# Patient Record
Sex: MALE | Race: WHITE
[De-identification: names, ages, dates, MRNs, and addresses within clinical notes are randomized per-mention and may not be internally consistent; named-entity substitution may affect disease eponyms.]

---

## 2017-01-12 ENCOUNTER — HOSPITAL ENCOUNTER (OUTPATIENT)
Dept: HOSPITAL 45 - C.LABPVFM | Age: 77
Discharge: HOME | End: 2017-01-12
Attending: GENERAL PRACTICE
Payer: COMMERCIAL

## 2017-01-12 DIAGNOSIS — M10.9: Primary | ICD-10-CM

## 2017-01-12 LAB
ANION GAP SERPL CALC-SCNC: 11 MMOL/L (ref 3–11)
BUN SERPL-MCNC: 17 MG/DL (ref 7–18)
BUN/CREAT SERPL: 13.9 (ref 10–20)
CALCIUM SERPL-MCNC: 9.4 MG/DL (ref 8.5–10.1)
CHLORIDE SERPL-SCNC: 103 MMOL/L (ref 98–107)
CO2 SERPL-SCNC: 25 MMOL/L (ref 21–32)
CREAT SERPL-MCNC: 1.2 MG/DL (ref 0.6–1.4)
GLUCOSE SERPL-MCNC: 113 MG/DL (ref 70–99)
POTASSIUM SERPL-SCNC: 3.9 MMOL/L (ref 3.5–5.1)
SODIUM SERPL-SCNC: 139 MMOL/L (ref 136–145)
URATE SERPL-MCNC: 4.6 MG/DL (ref 2.6–7.2)

## 2017-02-21 ENCOUNTER — HOSPITAL ENCOUNTER (INPATIENT)
Dept: HOSPITAL 45 - C.EDB | Age: 77
LOS: 6 days | Discharge: NURSING, REHAB OR LONG-TERM CARE | DRG: 558 | End: 2017-02-27
Attending: HOSPITALIST | Admitting: HOSPITALIST
Payer: COMMERCIAL

## 2017-02-21 VITALS
DIASTOLIC BLOOD PRESSURE: 72 MMHG | TEMPERATURE: 98.6 F | OXYGEN SATURATION: 96 % | SYSTOLIC BLOOD PRESSURE: 161 MMHG | HEART RATE: 95 BPM

## 2017-02-21 VITALS
HEART RATE: 88 BPM | TEMPERATURE: 99.14 F | SYSTOLIC BLOOD PRESSURE: 166 MMHG | OXYGEN SATURATION: 93 % | DIASTOLIC BLOOD PRESSURE: 76 MMHG

## 2017-02-21 VITALS
BODY MASS INDEX: 33.28 KG/M2 | HEIGHT: 68 IN | WEIGHT: 219.58 LBS | HEIGHT: 68 IN | WEIGHT: 219.58 LBS | BODY MASS INDEX: 33.28 KG/M2

## 2017-02-21 VITALS — OXYGEN SATURATION: 96 %

## 2017-02-21 VITALS
TEMPERATURE: 97.7 F | DIASTOLIC BLOOD PRESSURE: 68 MMHG | HEART RATE: 80 BPM | OXYGEN SATURATION: 96 % | SYSTOLIC BLOOD PRESSURE: 127 MMHG

## 2017-02-21 VITALS — OXYGEN SATURATION: 97 %

## 2017-02-21 DIAGNOSIS — M62.82: Primary | ICD-10-CM

## 2017-02-21 DIAGNOSIS — E11.42: ICD-10-CM

## 2017-02-21 DIAGNOSIS — R20.0: ICD-10-CM

## 2017-02-21 DIAGNOSIS — M10.9: ICD-10-CM

## 2017-02-21 DIAGNOSIS — I10: ICD-10-CM

## 2017-02-21 DIAGNOSIS — Z79.899: ICD-10-CM

## 2017-02-21 DIAGNOSIS — E11.65: ICD-10-CM

## 2017-02-21 DIAGNOSIS — K57.90: ICD-10-CM

## 2017-02-21 DIAGNOSIS — Z79.82: ICD-10-CM

## 2017-02-21 DIAGNOSIS — R11.0: ICD-10-CM

## 2017-02-21 DIAGNOSIS — E11.43: ICD-10-CM

## 2017-02-21 DIAGNOSIS — Z87.891: ICD-10-CM

## 2017-02-21 DIAGNOSIS — Z79.84: ICD-10-CM

## 2017-02-21 DIAGNOSIS — G47.33: ICD-10-CM

## 2017-02-21 DIAGNOSIS — E78.2: ICD-10-CM

## 2017-02-21 DIAGNOSIS — Z86.718: ICD-10-CM

## 2017-02-21 DIAGNOSIS — G60.9: ICD-10-CM

## 2017-02-21 DIAGNOSIS — F41.9: ICD-10-CM

## 2017-02-21 DIAGNOSIS — R29.6: ICD-10-CM

## 2017-02-21 DIAGNOSIS — G47.00: ICD-10-CM

## 2017-02-21 LAB
ALP SERPL-CCNC: 99 U/L (ref 45–117)
ALT SERPL-CCNC: 24 U/L (ref 12–78)
ANION GAP SERPL CALC-SCNC: 11 MMOL/L (ref 3–11)
APPEARANCE UR: CLEAR
AST SERPL-CCNC: 56 U/L (ref 15–37)
BASOPHILS # BLD: 0.01 K/UL (ref 0–0.2)
BASOPHILS NFR BLD: 0.2 %
BENZODIAZ UR-MCNC: (no result) UG/L
BILIRUB UR-MCNC: (no result) MG/DL
BUN SERPL-MCNC: 13 MG/DL (ref 7–18)
BUN/CREAT SERPL: 10.1 (ref 10–20)
CALCIUM SERPL-MCNC: 8.8 MG/DL (ref 8.5–10.1)
CHLORIDE SERPL-SCNC: 102 MMOL/L (ref 98–107)
CKMB/CK RATIO: 0.5 (ref 0–3)
CO2 SERPL-SCNC: 24 MMOL/L (ref 21–32)
COLOR UR: YELLOW
COMPLETE: YES
CREAT CL PREDICTED SERPL C-G-VRATE: 56 ML/MIN
CREAT SERPL-MCNC: 1.3 MG/DL (ref 0.6–1.4)
EOSINOPHIL NFR BLD AUTO: 184 K/UL (ref 130–400)
GLUCOSE SERPL-MCNC: 136 MG/DL (ref 70–99)
HCT VFR BLD CALC: 43 % (ref 42–52)
IG%: 0.3 %
IMM GRANULOCYTES NFR BLD AUTO: 11.1 %
INR PPP: 1.1 (ref 0.9–1.1)
LYMPHOCYTES # BLD: 0.69 K/UL (ref 1.2–3.4)
MAGNESIUM SERPL-MCNC: 1.9 MG/DL (ref 1.8–2.4)
MANUAL MICROSCOPIC REQUIRED?: NO
MCH RBC QN AUTO: 31.5 PG (ref 25–34)
MCHC RBC AUTO-ENTMCNC: 34 G/DL (ref 32–36)
MCV RBC AUTO: 92.7 FL (ref 80–100)
MONOCYTES NFR BLD: 19.3 %
NEUTROPHILS # BLD AUTO: 0.2 %
NEUTROPHILS NFR BLD AUTO: 68.9 %
NITRITE UR QL STRIP: (no result)
PARTIAL THROMBOPLASTIN RATIO: 1
PCP UR-MCNC: (no result) UG/L
PH UR STRIP: 6 [PH] (ref 4.5–7.5)
PMV BLD AUTO: 10.4 FL (ref 7.4–10.4)
POTASSIUM SERPL-SCNC: 3.9 MMOL/L (ref 3.5–5.1)
PROTHROMBIN TIME: 11.9 SECONDS (ref 9–12)
RBC # BLD AUTO: 4.64 M/UL (ref 4.7–6.1)
REVIEW REQ?: NO
SODIUM SERPL-SCNC: 137 MMOL/L (ref 136–145)
SP GR UR STRIP: 1.01 (ref 1–1.03)
URINE BILL WITH OR WITHOUT MIC: (no result)
URINE EPITHELIAL CELL AUTO: (no result) /LPF (ref 0–5)
UROBILINOGEN UR-MCNC: (no result) MG/DL
WBC # BLD AUTO: 6.22 K/UL (ref 4.8–10.8)

## 2017-02-21 RX ADMIN — GABAPENTIN SCH MG: 300 CAPSULE ORAL at 17:54

## 2017-02-21 RX ADMIN — SODIUM CHLORIDE SCH MLS/HR: 900 INJECTION, SOLUTION INTRAVENOUS at 21:10

## 2017-02-21 RX ADMIN — INSULIN ASPART SCH UNITS: 100 INJECTION, SOLUTION INTRAVENOUS; SUBCUTANEOUS at 21:00

## 2017-02-21 RX ADMIN — GABAPENTIN SCH MG: 300 CAPSULE ORAL at 21:10

## 2017-02-21 RX ADMIN — SODIUM CHLORIDE SCH MLS/HR: 900 INJECTION, SOLUTION INTRAVENOUS at 14:44

## 2017-02-21 RX ADMIN — INSULIN ASPART SCH UNITS: 100 INJECTION, SOLUTION INTRAVENOUS; SUBCUTANEOUS at 17:53

## 2017-02-21 NOTE — EMERGENCY ROOM VISIT NOTE
History


Report prepared by Uma:  Devonte Gould


Under the Supervision of:  Dr. Jose Alejandro Elise D.O.


First contact with patient:  09:20


Chief Complaint:  NEURO SYMPTOMS


Stated Complaint:  NUMBNESS IN LEGS,ARMS,HANDS, FALLS


Nursing Triage Summary:  


pt reports since last night bilat leg weakness and feeling numb.  pt reports 


last night also his arm were numb and weak but "my arms are getting better."





History of Present Illness


The patient is a 76 year old male who presents to the Emergency Room with 

complaints of worsening generalized weakness that started around 0230 last 

night. The patient's extremities are all weak, but his arms appear to be 

improving. The patient's daughter notes that his bilateral legs appear slightly 

more swollen. The patient denies any fevers, headaches, cough, chest pain, 

shortness of breath, nausea, vomiting, or  abdominal pain. He has not had any 

recent illnesses. He denies any stroke history. The patient is normally able to 

ambulate, but was unable this morning secondary to weakness. He states that he 

has been eating and drinking okay. The patient appears to be at baseline mental 

status, as per daughter. She notes that he has had some falls recently. He has 

been going to PT since an accident in November with his last appointment being 

yesterday. The patient is no longer on blood thinners, which he was on for past 

DVTs and a PE.





   Source of History:  patient, family


   Onset:  0230 last night


   Position:  other (generalized)


   Quality:  other (weakness)


   Timing:  worsening


   Associated Symptoms:  No SOB, No abdominal pain, No chest pain, No cough, No 

fevers, No headache, No nausea, No vomiting





Review of Systems


See HPI for pertinent positives & negatives. A total of 10 systems reviewed and 

were otherwise negative.





Past Medical & Surgical


Medical Problems:


(1) BENIGN HYPERTENSION


(2) DIAB W KETOACIDOSIS, TYPE II OR UNSPEC TYPE, UNCONTROLLED


(3) DIVERTICULOSIS COLON (W/O MENT OF HEMORRHAGE)


(4) GOUT NOS


(5) IDIO PERIPH NEURPTHY NOS


(6) MIXED HYPERLIPIDEMIA








Family History





Diabetes mellitus


FH: breast cancer





Social History


Smoking Status:  Former Smoker


Alcohol Use:  none


Drug Use:  none


Marital Status:  


Housing Status:  lives with significant other


Occupation Status:  retired





Current/Historical Medications


Scheduled


Allopurinol (Zyloprim), 300 MG PO DAILY


Aspirin (Aspirin Ec), 81 MG PO DAILY


Cholecalciferol (Vitamin D3), 2,000 UNITS PO DAILY


Gabapentin (Neurontin), 300 MG PO QID


Metformin Hcl (Glucophage), 500 MG PO BID


Polyethylene Glycol 3350 (Bulk (Polyethylene Glycol 3350), 17 GM PO BID


Triamterene/Hctz (Dyazide 37.5MG/25MG), 0.5 TAB PO DAILY





Scheduled PRN


Clonazepam (Klonopin), 0.25-0.5 MG PO HS PRN for Sleep





Allergies


Coded Allergies:  


     No Known Allergies (Verified , 2/21/17)





Physical Exam


Vital Signs











  Date Time  Temp Pulse Resp B/P Pulse Ox O2 Delivery O2 Flow Rate FiO2


 


2/21/17 12:15  80 20 155/71 97 Room Air  


 


2/21/17 11:39     97 Room Air  


 


2/21/17 11:01  68 18 102/61 97 Room Air  


 


2/21/17 10:09  79 20 126/67 95 Room Air  


 


2/21/17 09:35     96 Room Air  


 


2/21/17 09:29  86      


 


2/21/17 09:22  83 20 137/69 96 Room Air  


 


2/21/17 09:07 37.1 86 18 113/68 96 Room Air  











Physical Exam


GENERAL:  Patient is awake and somewhat listless appearing, does not appear to 

be in pain or anxious.


EYES: The conjunctivae are clear.  The pupils are round and reactive. 


EARS, NOSE, MOUTH AND THROAT: The nose is without any evidence of any 

deformity. Mucous membranes are moist tongue is midline 


NECK: The neck is nontender and supple.


RESPIRATORY: Scattered rhonchi noted throughout, no tachypnea or pursed lip 

breathing.


CARDIOVASCULAR:  Regular rate and rhythm noted there no murmurs rubs or gallops 

normal S1 normal S2 


GASTROINTESTINAL: The abdomen is soft. Bowel sounds are present in all 

quadrants. Abdomen is nontender


MUSCULOSKELETAL/EXTREMITIES: There is no evidence of gross deformity full range 

of motion is noted in the hips and shoulders


SKIN: There is no obvious evidence of any rash. There are no petechiae, pallor 

or cyanosis noted. Trace pedal edema bilaterally.


NEUROLOGIC:  Patient is oriented x 3, strength is diminished but symmetric 

bilaterally, no drift in upper extremities, able to hold legs off of bed for 5 

seconds.





Medical Decision & Procedures


ER Provider


Diagnostic Interpretation:


Radiology results as stated below per my review and radiologist interpretation:





CHEST ONE VIEW PORTABLE





CLINICAL HISTORY: Stroke dyspnea





COMPARISON STUDY:  2/19/2016





FINDINGS: Minimal atelectasis left base. Lungs otherwise appear clear. Heart top


limits normal in size. 





IMPRESSION:  Minimal platelike atelectasis left base. Otherwise negative study 














Electronically signed by:  Gerald Carrillo M.D.


2/21/2017 10:20 AM





Dictated Date/Time:  2/21/2017 10:19 AM











HEAD CT NONCONTRAST





CT DOSE: 998.18 mGy.cm





HISTORY: Mental status change  Stroke





TECHNIQUE: Multiaxial CT images of the head were performed without the use of


intravenous contrast.





Comparison: None.





Findings: The paranasal sinuses and mastoid air cells are clear. The calvarium


and skull base are intact. The ventricles and sulci are within normal limits.


There is no mass, hematoma, midline shift, or acute infarct.





Impression:


No acute intracranial abnormality. 











Electronically signed by:  Gerald Carrillo M.D.


2/21/2017 10:06 AM





Dictated Date/Time:  2/21/2017 10:01 AM





Laboratory Results


2/21/17 09:48








Red Blood Count 4.64, Mean Corpuscular Volume 92.7, Mean Corpuscular Hemoglobin 

31.5, Mean Corpuscular Hemoglobin Concent 34.0, Mean Platelet Volume 10.4, 

Neutrophils (%) (Auto) 68.9, Lymphocytes (%) (Auto) 11.1, Monocytes (%) (Auto) 

19.3, Eosinophils (%) (Auto) 0.2, Basophils (%) (Auto) 0.2, Neutrophils # (Auto

) 4.29, Lymphocytes # (Auto) 0.69, Monocytes # (Auto) 1.20, Eosinophils # (Auto

) 0.01, Basophils # (Auto) 0.01





2/21/17 09:48

















Test


  2/21/17


09:48 2/21/17


10:07


 


White Blood Count


  6.22 K/uL


(4.8-10.8) 


 


 


Red Blood Count


  4.64 M/uL


(4.7-6.1) 


 


 


Hemoglobin


  14.6 g/dL


(14.0-18.0) 


 


 


Hematocrit 43.0 % (42-52)  


 


Mean Corpuscular Volume


  92.7 fL


() 


 


 


Mean Corpuscular Hemoglobin


  31.5 pg


(25-34) 


 


 


Mean Corpuscular Hemoglobin


Concent 34.0 g/dl


(32-36) 


 


 


Platelet Count


  184 K/uL


(130-400) 


 


 


Mean Platelet Volume


  10.4 fL


(7.4-10.4) 


 


 


Neutrophils (%) (Auto) 68.9 %  


 


Lymphocytes (%) (Auto) 11.1 %  


 


Monocytes (%) (Auto) 19.3 %  


 


Eosinophils (%) (Auto) 0.2 %  


 


Basophils (%) (Auto) 0.2 %  


 


Neutrophils # (Auto)


  4.29 K/uL


(1.4-6.5) 


 


 


Lymphocytes # (Auto)


  0.69 K/uL


(1.2-3.4) 


 


 


Monocytes # (Auto)


  1.20 K/uL


(0.11-0.59) 


 


 


Eosinophils # (Auto)


  0.01 K/uL


(0-0.5) 


 


 


Basophils # (Auto)


  0.01 K/uL


(0-0.2) 


 


 


RDW Standard Deviation


  46.6 fL


(36.4-46.3) 


 


 


RDW Coefficient of Variation


  13.7 %


(11.5-14.5) 


 


 


Immature Granulocyte % (Auto) 0.3 %  


 


Immature Granulocyte # (Auto)


  0.02 K/uL


(0.00-0.02) 


 


 


Erythrocyte Sedimentation Rate


  13 mm/hr


(0-14) 


 


 


Prothrombin Time


  11.9 SECONDS


(9.0-12.0) 


 


 


Prothromb Time International


Ratio 1.1 (0.9-1.1) 


  


 


 


Activated Partial


Thromboplast Time 26.7 SECONDS


(21.0-31.0) 


 


 


Partial Thromboplastin Ratio 1.0  


 


Anion Gap


  11.0 mmol/L


(3-11) 


 


 


Est Creatinine Clear Calc


Drug Dose 56.0 ml/min 


  


 


 


Estimated GFR (


American) 61.9 


  


 


 


Estimated GFR (Non-


American 53.4 


  


 


 


BUN/Creatinine Ratio 10.1 (10-20)  


 


Calcium Level


  8.8 mg/dl


(8.5-10.1) 


 


 


Magnesium Level


  1.9 mg/dl


(1.8-2.4) 


 


 


Total Bilirubin


  0.7 mg/dl


(0.2-1) 


 


 


Direct Bilirubin


  0.2 mg/dl


(0-0.2) 


 


 


Aspartate Amino Transf


(AST/SGOT) 56 U/L (15-37) 


  


 


 


Alanine Aminotransferase


(ALT/SGPT) 24 U/L (12-78) 


  


 


 


Alkaline Phosphatase


  99 U/L


() 


 


 


Total Creatine Kinase


  2844 U/L


() 


 


 


Creatine Kinase MB


  13.1 ng/ml


(0.5-3.6) 


 


 


Creatine Kinase MB Ratio 0.5 (0-3.0)  


 


Troponin I


  < 0.015 ng/ml


(0-0.045) 


 


 


C-Reactive Protein


  0.87 mg/dl


(0-0.29) 


 


 


Total Protein


  7.2 gm/dl


(6.4-8.2) 


 


 


Albumin


  3.5 gm/dl


(3.4-5.0) 


 


 


Urine Color  YELLOW 


 


Urine Appearance  CLEAR (CLEAR) 


 


Urine pH  6.0 (4.5-7.5) 


 


Urine Specific Gravity


  


  1.007


(1.000-1.030)


 


Urine Protein  NEG (NEG) 


 


Urine Glucose (UA)  NEG (NEG) 


 


Urine Ketones  NEG (NEG) 


 


Urine Occult Blood  3+ (NEG) 


 


Urine Nitrite  NEG (NEG) 


 


Urine Bilirubin  NEG (NEG) 


 


Urine Urobilinogen  NEG (NEG) 


 


Urine Leukocyte Esterase  TRACE (NEG) 


 


Urine WBC (Auto)  1-5 /hpf (0-5) 


 


Urine RBC (Auto)  0-4 /hpf (0-4) 


 


Urine Hyaline Casts (Auto)  0 /lpf (0-5) 


 


Urine Epithelial Cells (Auto)


  


  5-10 /lpf


(0-5)


 


Urine Bacteria (Auto)  NEG (NEG) 


 


Urine Opiates Screen  NEG (NEG) 


 


Urine Methadone, Qualitative  NEG (NEG) 


 


Urine Barbiturates  NEG (NEG) 


 


Urine Phencyclidine (PCP)


Level 


  NEG (NEG) 


 


 


Ur


Amphetamine/Methamphetamine 


  NEG (NEG) 


 


 


MDMA (Ecstasy) Screen  NEG (NEG) 


 


Urine Benzodiazepines Screen  NEG (NEG) 


 


Urine Cocaine Metabolite  NEG (NEG) 


 


Urine Marijuana (THC)  NEG (NEG) 





Laboratory results per my review.





Medications Administered











 Medications


  (Trade)  Dose


 Ordered  Sig/Kalpesh


 Route  Start Time


 Stop Time Status Last Admin


Dose Admin


 


 Sodium Chloride  1,000 ml @ 


 50 mls/hr  Q20H


 IV  2/21/17 09:27


 2/21/17 12:38 DC 2/21/17 10:11


50 MLS/HR


 


 Sodium Chloride  1,000 ml @ 


 999 mls/hr  Q1H1M STAT


 IV  2/21/17 11:18


 2/21/17 12:18 DC 2/21/17 11:24


999 MLS/HR


 


 Sodium Chloride


  (Nss 1000ml)  1,000 ml @ 


 125 mls/hr  Q8H


 IV  2/21/17 12:30


 3/23/17 12:29  2/21/17 14:44


125 MLS/HR











ECG


Indication:  weakness


Rate (beats per minute):  81


Rhythm:  normal sinus


Findings:  no acute ischemic change, other (LVH noted by voltage criteria)


Comparison ECG Date:  20 Feb. 2016


Change:  no significant change





ED Course


0920: The patient was evaluated in room B11b. A complete history and physical 

examination were performed. 





0927: NSS 1000 ml @ 50 mls/hr.





1117: [facility] the [facility] The 

patient will be evaluated.





1118: NSS 1000 ml @ 999 mls/hr.





1121: Updated the patient. He verbalized agreement of the plan.





Medical Decision


Prior records/ancillary studies reviewed and summarized above.


Nursing notes reviewed.


Additional history obtained from daughter.


The patient's history was concerning for weakness.





Differential diagnosis:


Etiologies such as metabolic, infection, hypo/hyperglycemia, electrolyte 

abnormalities, cardiac sources, intracerebral event, toxicologic, neurologic, 

as well as others were entertained. 





The patient is a 76-year-old male who presented to the emergency department for 

an evaluation of generalized weakness. The patient the patient did not have any 

definite focal neurologic deficits but he was certainly week in both lower 

extremities. The patient was not found have an acute abnormality on CT the head 

was found have elevated CPK consistent with rhabdomyolysis on laboratory 

studies. The patient was treated with IV fluids. He was reevaluated multiple 

times. I discussed the patient's laboratory and radiographic studies with him 

and his family. I also discussed his case with the on-call [facility]. They have agreed to evaluate the patient in the emergency 

department for further management and disposition.





Consults


Time Called:  1112


Consulting Physician:  [facility]


Returned Call:  1117


1117: Discussed the case with [facility]. The 

patient will be evaluated.





Impression





 Primary Impression:  


 Weakness


 Additional Impression:  


 Rhabdomyolysis





Scribe Attestation


The scribe's documentation has been prepared under my direction and personally 

reviewed by me in its entirety. I confirm that the note above accurately 

reflects all work, treatment, procedures, and medical decision making performed 

by me.





Departure Information


Dispostion


Being Evaluated By Hospitalist





Referrals


Johnnie Reis M.D. (PCP)





Patient Instructions


[facility]





Problem Qualifiers

## 2017-02-21 NOTE — HISTORY AND PHYSICAL
History & Physical


Date & Time of Service:


Feb 21, 2017 at 12:48


Chief Complaint:


Numbness In Legs,Arms,Hands, Falls


Primary Care Physician:


Johnnie Reis M.D.


History of Present Illness


Source:  patient, family


Mr. Chaudhari is a 77 y/o male with PMHx of T2DM with Diabetic Neuropathy, HTN, Gout

, DVT with PE (2014) off anticoagulation who presents to the ED c/o generalized 

arm and leg weakness and numbness/tingling that started at 0230. Patient 

reports that he does have chronic numbness/tingling of lower extremities due to 

his diabetes but is normally not this severe in which it affects ambulation. He 

has participated in outpatient PT twice a week and was fine during yesterday's 

session. He reports that he ambulates without assistive devices at home. At 0230

, he tried to walk but due to the numbness he lost his balance and fell. He was 

able to get himself up. He denies lightheadedness/dizziness prior to the fall, 

hitting his head, or LOC. He then reports another fall that occurred at approx. 

0630 due to the same circumstances. However this time he reports he was lying 

on the ground for approx. 10-20 minutes. Prior to today, he reports a near fall 

yesterday but fell into his son's truck and didn't hit the ground. Prior to 

this he hasn't fallen in 6 or more months. Daughter reports lower extremities 

are more swollen however patient denies this. He denies fever/chills, CP, 

palpitations, SOB, N/V, abdominal pain, dysuria, constipation/diarrhea, or 

melena/hematochezia.





In the ED, CT was negative for acute intracranial findings. CXR without 

evidence of consolidation. He is afebrile and normotensive. Labs largely 

unremarkable except for CK of 2844. UA with evidence of 3+ occult blood but 

negative for bacteria. He was hydrated with NSS 1 L bolus then 1 L at 50 mL/hr. 

He will be admitted to Med/Surg for Rhadomyolysis.





Past Medical/Surgical History


Medical Problems:


(1) BENIGN HYPERTENSION


Status: Chronic  





(2) DIAB W KETOACIDOSIS, TYPE II OR UNSPEC TYPE, UNCONTROLLED


Status: Chronic  





(3) DIVERTICULOSIS COLON (W/O MENT OF HEMORRHAGE)


Status: Chronic  





(4) GOUT NOS


Status: Chronic  





(5) IDIO PERIPH NEURPTHY NOS


Status: Chronic  





(6) MIXED HYPERLIPIDEMIA


Status: Chronic  











Family History





Diabetes mellitus


FH: breast cancer





Social History


Smoking Status:  Former Smoker


Drug Use:  none


Marital Status:  


Occupational Status:  retired





Multi-Drug Resistant Organisms


History of MDRO:  No





Allergies


Coded Allergies:  


     No Known Allergies (Verified , 2/21/17)





Home Medications


Scheduled


Allopurinol (Zyloprim), 300 MG PO DAILY


Aspirin (Aspirin Ec), 81 MG PO DAILY


Cholecalciferol (Vitamin D3), 2,000 UNITS PO DAILY


Gabapentin (Neurontin), 300 MG PO QID


Metformin Hcl (Glucophage), 500 MG PO BID


Polyethylene Glycol 3350 (Bulk (Polyethylene Glycol 3350), 17 GM PO BID


Triamterene/Hctz (Dyazide 37.5MG/25MG), 0.5 TAB PO DAILY





Scheduled PRN


Clonazepam (Klonopin), 0.25-0.5 MG PO HS PRN for Sleep





Review of Systems


Constitutional:  No chills, No fever


Eyes:  No worsening of vision


ENT:  No nasal symptoms, No sore throat, No trouble swallowing


Respiratory:  No cough, No shortness of breath


Cardiovascular:  No chest pain


Abdomen:  No constipation, No diarrhea, No nausea, No pain, No vomiting


Musculoskeletal:  No calf pain, No swelling


Genitourinary - Male:  No dysuria


Neurologic:  + balance problems, + numbness/tingling (upper and lower 

extremities bilat; upper extremities improving), + weakness (generalized UE and 

LE B/L with improvement in UE B/L)


Endocrine:  No fatigue


Integumentary:  + problem reported (multiple red superficial abrasions of upper 

extremities; small abrasion of L forehead patient reports is from CPAP), No rash





Physical Exam


Vital Signs











  Date Time  Temp Pulse Resp B/P Pulse Ox O2 Delivery O2 Flow Rate FiO2


 


2/21/17 12:15  80 20 155/71 97 Room Air  


 


2/21/17 11:39     97 Room Air  


 


2/21/17 11:01  68 18 102/61 97 Room Air  


 


2/21/17 10:09  79 20 126/67 95 Room Air  


 


2/21/17 09:35     96 Room Air  


 


2/21/17 09:29  86      


 


2/21/17 09:22  83 20 137/69 96 Room Air  


 


2/21/17 09:07 37.1 86 18 113/68 96 Room Air  








General Appearance:  WD/WN, no apparent distress, + pertinent finding (mask-

like face)


Eyes:  PERRL, EOMI, sclerae normal, + pertinent finding (Ptosis of L eye)


ENT:  hearing grossly normal


Neck:  supple, no adenopathy, no JVD, trachea midline


Respiratory/Chest:  lungs clear, normal breath sounds, no respiratory distress, 

no accessory muscle use


Cardiovascular:  regular rate, rhythm, no gallop, no murmur


Abdomen/GI:  normal bowel sounds, non tender, soft, + distended


Back:  normal inspection, no CVA tenderness


Extremities/Musculoskelatal:  no calf tenderness, + swelling (trace non-pitting 

edema of ankles)


Neurologic/Psych:  alert, oriented x 3, + pertinent finding (diffuse muscle 

weakness of upper and lower extremities but equal B/L; facial movements 

symmetrical)


Skin:  normal color, warm/dry





Diagnostics


Laboratory Results





Results Past 24 Hours








Test


  2/21/17


09:48 2/21/17


10:06 2/21/17


10:07 2/21/17


12:13 Range/Units


 


 


White Blood Count 6.22    4.8-10.8  K/uL


 


Red Blood Count 4.64    4.7-6.1  M/uL


 


Hemoglobin 14.6    14.0-18.0  g/dL


 


Hematocrit 43.0    42-52  %


 


Mean Corpuscular Volume 92.7      fL


 


Mean Corpuscular Hemoglobin 31.5    25-34  pg


 


Mean Corpuscular Hemoglobin


Concent 34.0


  


  


  


  32-36  g/dl


 


 


Platelet Count 184    130-400  K/uL


 


Mean Platelet Volume 10.4    7.4-10.4  fL


 


Neutrophils (%) (Auto) 68.9     %


 


Lymphocytes (%) (Auto) 11.1     %


 


Monocytes (%) (Auto) 19.3     %


 


Eosinophils (%) (Auto) 0.2     %


 


Basophils (%) (Auto) 0.2     %


 


Neutrophils # (Auto) 4.29    1.4-6.5  K/uL


 


Lymphocytes # (Auto) 0.69    1.2-3.4  K/uL


 


Monocytes # (Auto) 1.20    0.11-0.59  K/uL


 


Eosinophils # (Auto) 0.01    0-0.5  K/uL


 


Basophils # (Auto) 0.01    0-0.2  K/uL


 


RDW Standard Deviation 46.6    36.4-46.3  fL


 


RDW Coefficient of Variation 13.7    11.5-14.5  %


 


Immature Granulocyte % (Auto) 0.3     %


 


Immature Granulocyte # (Auto) 0.02    0.00-0.02  K/uL


 


Prothrombin Time


  11.9


  


  


  


  9.0-12.0


SECONDS


 


Prothromb Time International


Ratio 1.1


  


  


  


  0.9-1.1  


 


 


Activated Partial


Thromboplast Time 26.7


  


  


  


  21.0-31.0


SECONDS


 


Partial Thromboplastin Ratio 1.0     


 


Sodium Level 137    136-145  mmol/L


 


Potassium Level 3.9    3.5-5.1  mmol/L


 


Chloride Level 102      mmol/L


 


Carbon Dioxide Level 24    21-32  mmol/L


 


Anion Gap 11.0    3-11  mmol/L


 


Blood Urea Nitrogen 13    7-18  mg/dl


 


Creatinine


  1.30


  


  


  


  0.60-1.40


mg/dl


 


Est Creatinine Clear Calc


Drug Dose 56.0


  


  


  


   ml/min


 


 


Estimated GFR (


American) 61.9


  


  


  


   


 


 


Estimated GFR (Non-


American 53.4


  


  


  


   


 


 


BUN/Creatinine Ratio 10.1    10-20  


 


Random Glucose 136    70-99  mg/dl


 


Calcium Level 8.8    8.5-10.1  mg/dl


 


Magnesium Level 1.9    1.8-2.4  mg/dl


 


Total Bilirubin 0.7    0.2-1  mg/dl


 


Direct Bilirubin 0.2    0-0.2  mg/dl


 


Aspartate Amino Transf


(AST/SGOT) 56


  


  


  


  15-37  U/L


 


 


Alanine Aminotransferase


(ALT/SGPT) 24


  


  


  


  12-78  U/L


 


 


Alkaline Phosphatase 99      U/L


 


Total Creatine Kinase 2844      U/L


 


Creatine Kinase MB 13.1    0.5-3.6  ng/ml


 


Creatine Kinase MB Ratio 0.5    0-3.0  


 


Troponin I < 0.015    0-0.045  ng/ml


 


Total Protein 7.2    6.4-8.2  gm/dl


 


Albumin 3.5    3.4-5.0  gm/dl


 


Bedside Glucose  117   70-99  mg/dl


 


Urine Color   YELLOW   


 


Urine Appearance   CLEAR  CLEAR  


 


Urine pH   6.0  4.5-7.5  


 


Urine Specific Gravity   1.007  1.000-1.030  


 


Urine Protein   NEG  NEG  


 


Urine Glucose (UA)   NEG  NEG  


 


Urine Ketones   NEG  NEG  


 


Urine Occult Blood   3+  NEG  


 


Urine Nitrite   NEG  NEG  


 


Urine Bilirubin   NEG  NEG  


 


Urine Urobilinogen   NEG  NEG  


 


Urine Leukocyte Esterase   TRACE  NEG  


 


Urine WBC (Auto)   1-5  0-5  /hpf


 


Urine RBC (Auto)   0-4  0-4  /hpf


 


Urine Hyaline Casts (Auto)   0  0-5  /lpf


 


Urine Epithelial Cells (Auto)   5-10  0-5  /lpf


 


Urine Bacteria (Auto)   NEG  NEG  


 


Urine Opiates Screen   NEG  NEG  


 


Urine Methadone, Qualitative   NEG  NEG  


 


Urine Barbiturates   NEG  NEG  


 


Urine Phencyclidine (PCP)


Level 


  


  NEG


  


  NEG  


 


 


Ur


Amphetamine/Methamphetamine 


  


  NEG


  


  NEG  


 


 


MDMA (Ecstasy) Screen   NEG  NEG  


 


Urine Benzodiazepines Screen   NEG  NEG  


 


Urine Cocaine Metabolite   NEG  NEG  


 


Urine Marijuana (THC)   NEG  NEG  











Diagnostic Radiology


HEAD CT NONCONTRAST





CT DOSE: 998.18 mGy.cm





HISTORY: Mental status change  Stroke





TECHNIQUE: Multiaxial CT images of the head were performed without the use of


intravenous contrast.





Comparison: None.





Findings: The paranasal sinuses and mastoid air cells are clear. The calvarium


and skull base are intact. The ventricles and sulci are within normal limits.


There is no mass, hematoma, midline shift, or acute infarct.





Impression:


No acute intracranial abnormality. 





CHEST ONE VIEW PORTABLE





CLINICAL HISTORY: Stroke dyspnea





COMPARISON STUDY:  2/19/2016





FINDINGS: Minimal atelectasis left base. Lungs otherwise appear clear. Heart top


limits normal in size. 





IMPRESSION:  Minimal platelike atelectasis left base. Otherwise negative study





Impression


Assessment and Plan


Mr. Chaudhari is a 77 y/o male with PMHx of T2DM with Diabetic Neuropathy, HTN, Gout

, GIULIA, DVT with PE (2014) off anticoagulation who presents to the ED c/o 

generalized arm and leg weakness and numbness/tingling that started at 0230. In 

the ED, CT was negative for acute intracranial findings. CXR without evidence 

of consolidation. He is afebrile and normotensive. Labs largely unremarkable 

except for CK of 2844. UA with evidence of 3+ occult blood but negative for 

bacteria. He was hydrated with NSS 1 L bolus then 1 L at 50 mL/hr. He will be 

admitted to Med/Surg for Rhadomyolysis.





Rhabdomyolysis:


- NSS at 125 mL/hr


- Trend CK





Generalized Weakness and Recent Falls:


- This may be multifactorial given H/O neuropathy and current rhabdomyolysis. 

No focal deficits appreciated just generalized weakness and will not pursue 

further neurological imaging at this time. Patient is afebrile without 

leukocytosis. Obvious source of infection not appreciated.


- Will obtain a B12 level, ESR, and CRP


- PT/OT Evaluations





T2DM with Diabetic Neuropathy: A1c 5.8 (October 2016)


- Repeat A1c


- Hold Metformin


- SSI - goal 120-160 and correction factor 40


- Gabapentin 300 mg QID





HTN:


- Hold Dyazide and use Hydralazine PRN





Gout:


- Allopurinol 300 mg daily





GIULIA:


- Patient may use own CPAP





DVT Prophylaxis:


- Lovenox 40 mg SC daily





Code Status:


- FULL RESUSCITATION





Disposition:


- Lives at home with son and reports no current needs at home





Level of Care


Med/Surg





Advanced Directives


Existing Living Will:  No


Existing Power of :  No





Resuscitation Status


FULL RESUSCITATION





VTE Prophylaxis


VTE Risk Assessment Done? Y/N:  Yes


Risk Level:  Moderate


Given or contraindicated:  Enoxaparin (Lovenox)SQ





Reviewed:  Pt Seen/Exam by Me, RN Notes, HO Notes, Prior Records, Labs, RAD, EKG


History


I agree with PA H&P with some modifications as below





Mr. Chaudhari is a 77 y/o male with PMHx of T2DM with Diabetic Neuropathy, HTN, Gout

, GIULIA, DVT with PE (2014) off anticoagulation who presents to the ED c/o 

generalized arm and leg weakness and numbness/tingling that started at 0230. In 

the ED, CT was negative for acute intracranial findings. CXR without evidence 

of consolidation. He is afebrile and normotensive. Labs largely unremarkable 

except for CK of 2844. UA with evidence of 3+ occult blood but negative for 

bacteria. He was hydrated with NSS 1 L bolus then 1 L at 50 mL/hr.


Constitutional:  denies: chills


Respiratory:  negative: cough, short of breath


Cardiovascular:  denies chest pain


Gastrointestinal/Abdominal:  negative: abdominal pain


Genitourinary:  negative discharge


Musculoskeletal:  negative: back pain


Skin:  negative: change in color


Neurological/Psych:  negative: anxiety


Hematologic/Lymphatic:  negative: anemia


General Appearance:  WD/WN, no apparent distress


Eye Exam:  bilateral eye normal inspection


Ears, Nose, Throat:  hearing grossly normal, pharynx normal


Neck:  non-tender, normal inspection


Respiratory:  lungs clear, normal breath sounds


Cardiovascular:  regular rate, rhythm, no gallop


Gastrointestinal:  non tender, soft


Extremities:  normal inspection


Neurologic/Psychiatric:  no motor/sensory deficits, normal mood/affect


Skin Characteristics:  normal color


Assessment/Plan


A 77 y/o male with PMHx of T2DM with Diabetic Neuropathy, HTN, Gout, GIULIA, DVT 

with PE (2014) off anticoagulation who presents to the ED c/o generalized arm 

and leg weakness and numbness/tingling that started at 0230. In the ED, CT was 

negative for acute intracranial findings. CXR without evidence of 

consolidation. He is afebrile and normotensive. Labs largely unremarkable 

except for CK of 2844. UA with evidence of 3+ occult blood but negative for 

bacteria. 





Rhabdomyolysis:


cont NSS at 125 mL/hr


Trend CPK





Generalized Weakness and Recent Falls:


his may be multifactorial given hx diabetic neuropathy and current 

rhabdomyolysis. 


check B12 level, ESR, and CRP


PT/OT Evaluations





T2DM with Diabetic Neuropathy: A1c 5.8 (October 2016)


check A1c


Hold Metformin


SSI - goal 120-160 and correction factor 40


Gabapentin 300 mg QID





HTN:


Hold Dyazide and use Hydralazine PRN





Gout:


Allopurinol 300 mg daily





GIULIA:


Patient may use own CPAP





DVT Prophylaxis:


Lovenox 40 mg SC daily





Code Status:


FULL RESUSCITATION





case discussed with Bertha Antonio





time spent 45 min

## 2017-02-21 NOTE — DIAGNOSTIC IMAGING REPORT
CHEST ONE VIEW PORTABLE



CLINICAL HISTORY: Stroke dyspnea



COMPARISON STUDY:  2/19/2016



FINDINGS: Minimal atelectasis left base. Lungs otherwise appear clear. Heart top

limits normal in size. 



IMPRESSION:  Minimal platelike atelectasis left base. Otherwise negative study 









Electronically signed by:  Gerald Carrillo M.D.

2/21/2017 10:20 AM



Dictated Date/Time:  2/21/2017 10:19 AM

## 2017-02-21 NOTE — DIAGNOSTIC IMAGING REPORT
HEAD CT NONCONTRAST



CT DOSE: 998.18 mGy.cm



HISTORY: Mental status change  Stroke



TECHNIQUE: Multiaxial CT images of the head were performed without the use of

intravenous contrast.



Comparison: None.



Findings: The paranasal sinuses and mastoid air cells are clear. The calvarium

and skull base are intact. The ventricles and sulci are within normal limits.

There is no mass, hematoma, midline shift, or acute infarct.



Impression:

No acute intracranial abnormality. 







Electronically signed by:  Gerald Carrillo M.D.

2/21/2017 10:06 AM



Dictated Date/Time:  2/21/2017 10:01 AM

## 2017-02-22 VITALS
TEMPERATURE: 98.42 F | SYSTOLIC BLOOD PRESSURE: 156 MMHG | OXYGEN SATURATION: 95 % | DIASTOLIC BLOOD PRESSURE: 73 MMHG | HEART RATE: 76 BPM

## 2017-02-22 VITALS — SYSTOLIC BLOOD PRESSURE: 166 MMHG | DIASTOLIC BLOOD PRESSURE: 89 MMHG | OXYGEN SATURATION: 95 % | HEART RATE: 72 BPM

## 2017-02-22 VITALS — OXYGEN SATURATION: 95 %

## 2017-02-22 VITALS
TEMPERATURE: 98.06 F | DIASTOLIC BLOOD PRESSURE: 73 MMHG | SYSTOLIC BLOOD PRESSURE: 144 MMHG | OXYGEN SATURATION: 96 % | HEART RATE: 70 BPM

## 2017-02-22 VITALS
HEART RATE: 88 BPM | TEMPERATURE: 98.42 F | OXYGEN SATURATION: 94 % | SYSTOLIC BLOOD PRESSURE: 169 MMHG | DIASTOLIC BLOOD PRESSURE: 75 MMHG

## 2017-02-22 VITALS
HEART RATE: 75 BPM | SYSTOLIC BLOOD PRESSURE: 161 MMHG | TEMPERATURE: 98.96 F | OXYGEN SATURATION: 95 % | DIASTOLIC BLOOD PRESSURE: 85 MMHG

## 2017-02-22 VITALS — OXYGEN SATURATION: 94 %

## 2017-02-22 LAB
ANION GAP SERPL CALC-SCNC: 8 MMOL/L (ref 3–11)
BUN SERPL-MCNC: 11 MG/DL (ref 7–18)
BUN/CREAT SERPL: 9.9 (ref 10–20)
CALCIUM SERPL-MCNC: 8.3 MG/DL (ref 8.5–10.1)
CHLORIDE SERPL-SCNC: 102 MMOL/L (ref 98–107)
CO2 SERPL-SCNC: 27 MMOL/L (ref 21–32)
CREAT CL PREDICTED SERPL C-G-VRATE: 66.4 ML/MIN
CREAT SERPL-MCNC: 1.1 MG/DL (ref 0.6–1.4)
EOSINOPHIL NFR BLD AUTO: 177 K/UL (ref 130–400)
EST. AVERAGE GLUCOSE BLD GHB EST-MCNC: 123 MG/DL
GLUCOSE SERPL-MCNC: 91 MG/DL (ref 70–99)
HCT VFR BLD CALC: 42.9 % (ref 42–52)
MCH RBC QN AUTO: 31 PG (ref 25–34)
MCHC RBC AUTO-ENTMCNC: 33.3 G/DL (ref 32–36)
MCV RBC AUTO: 93.1 FL (ref 80–100)
PMV BLD AUTO: 10.6 FL (ref 7.4–10.4)
POTASSIUM SERPL-SCNC: 3.6 MMOL/L (ref 3.5–5.1)
RBC # BLD AUTO: 4.61 M/UL (ref 4.7–6.1)
SODIUM SERPL-SCNC: 137 MMOL/L (ref 136–145)
WBC # BLD AUTO: 5.54 K/UL (ref 4.8–10.8)

## 2017-02-22 RX ADMIN — GABAPENTIN SCH MG: 300 CAPSULE ORAL at 14:54

## 2017-02-22 RX ADMIN — SODIUM CHLORIDE SCH MLS/HR: 900 INJECTION, SOLUTION INTRAVENOUS at 22:09

## 2017-02-22 RX ADMIN — SODIUM CHLORIDE SCH MLS/HR: 900 INJECTION, SOLUTION INTRAVENOUS at 04:42

## 2017-02-22 RX ADMIN — POLYETHYLENE GLYCOL 3350 PRN GM: 17 POWDER, FOR SOLUTION ORAL at 14:54

## 2017-02-22 RX ADMIN — GABAPENTIN SCH MG: 300 CAPSULE ORAL at 17:00

## 2017-02-22 RX ADMIN — Medication SCH MG: at 08:20

## 2017-02-22 RX ADMIN — GABAPENTIN SCH MG: 300 CAPSULE ORAL at 22:07

## 2017-02-22 RX ADMIN — ALLOPURINOL SCH MG: 300 TABLET ORAL at 08:20

## 2017-02-22 RX ADMIN — INSULIN ASPART SCH UNITS: 100 INJECTION, SOLUTION INTRAVENOUS; SUBCUTANEOUS at 08:21

## 2017-02-22 RX ADMIN — INSULIN ASPART SCH UNITS: 100 INJECTION, SOLUTION INTRAVENOUS; SUBCUTANEOUS at 12:24

## 2017-02-22 RX ADMIN — INSULIN ASPART SCH UNITS: 100 INJECTION, SOLUTION INTRAVENOUS; SUBCUTANEOUS at 16:30

## 2017-02-22 RX ADMIN — GABAPENTIN SCH MG: 300 CAPSULE ORAL at 08:19

## 2017-02-22 RX ADMIN — INSULIN ASPART SCH UNITS: 100 INJECTION, SOLUTION INTRAVENOUS; SUBCUTANEOUS at 21:00

## 2017-02-22 RX ADMIN — ENOXAPARIN SODIUM SCH MG: 40 INJECTION SUBCUTANEOUS at 08:21

## 2017-02-22 RX ADMIN — SODIUM CHLORIDE SCH MLS/HR: 900 INJECTION, SOLUTION INTRAVENOUS at 14:54

## 2017-02-22 NOTE — PROGRESS NOTE
Subjective


Subjective


Date of Service:


Feb 22, 2017.


Pt evaluation today including:  conversation w/ patient, physical exam, chart 

review, review of studies, review of inpatient medication list


Notes:


had a fall today, did not hit his head, did hit his right elbow and chest





Problem List


Medical Problems:


(1) Rhabdomyolysis


Status: Acute  





(2) Weakness


Status: Acute  











Review of Systems


Constitutional:  No fever


Respiratory:  No cough


Abdomen:  No pain


Male :  No dysuria


Psychiatric:  No depression symptoms


Endo:  No fatigue





Physical Exam


Vital Signs


Vital Signs Past 24 Hours:











  Date Time  Temp Pulse Resp B/P Pulse Ox O2 Delivery O2 Flow Rate FiO2


 


2/22/17 16:08  72   95   


 


2/22/17 15:15 36.7 70 18 144/73 96 Room Air  


 


2/22/17 08:48     95 Room Air  


 


2/22/17 08:03 36.9 76 16 156/73 95 Room Air  


 


2/22/17 08:00     94 Room Air  


 


2/22/17 07:08 36.9 88 20 169/75 94 Room Air  


 


2/22/17 01:15      Room Air  





      CPAP  


 


2/21/17 22:50 37.3 88 21 166/76 93 Room Air  











Physical Exam:


General Appearance:  WD/WN, no apparent distress


Eyes:  bilateral eyes normal inspection


ENT:  hearing grossly normal


Neck:  supple


Respiratory/Chest:  chest non-tender


Cardiovascular:  no gallop


Abdomen:  non tender


Extremities:  normal inspection


Neurologic/Psychiatric:  normal mood/affect


Skin:  normal color





Medications


Medications:





 Current Inpatient Medications








 Medications


  (Trade)  Dose


 Ordered  Sig/Kalpesh


 Route  Start Time


 Stop Time Status Last Admin


Dose Admin


 


 Acetaminophen


  (Tylenol Tab)  650 mg  Q4H  PRN


 PO  2/21/17 12:15


 3/23/17 12:14   


 


 


 Al Hydrox/Mg


 Hydrox/Simethicone


  (Maalox Max Susp)  15 ml  Q4H  PRN


 PO  2/21/17 12:15


 3/23/17 12:14   


 


 


 Magnesium


 Hydroxide


  (Milk Of


 Magnesia Susp)  30 ml  Q6H  PRN


 PO  2/21/17 12:15


 3/23/17 12:14   


 


 


 Polyethylene


  (Miralax Powder


 Packet)  17 gm  DAILY  PRN


 PO  2/21/17 12:15


 3/23/17 12:14  2/22/17 14:54


17 GM


 


 Ondansetron HCl


  (Zofran Inj)  4 mg  Q6H  PRN


 IV  2/21/17 12:15


 3/23/17 12:14   


 


 


 Allopurinol


  (Zyloprim Tab)  300 mg  DAILY


 PO  2/22/17 09:00


 3/24/17 08:59  2/22/17 08:20


300 MG


 


 Aspirin


  (Ecotrin Tab)  81 mg  DAILY


 PO  2/22/17 09:00


 3/24/17 08:59  2/22/17 08:20


81 MG


 


 Gabapentin 300 mg  300 mg  QID


 PO  2/21/17 17:00


 3/23/17 16:59  2/22/17 14:54


300 MG


 


 Sodium Chloride


  (Nss 1000ml)  1,000 ml @ 


 125 mls/hr  Q8H


 IV  2/21/17 12:30


 3/23/17 12:29  2/22/17 14:54


125 MLS/HR


 


 Enoxaparin Sodium


  (Lovenox Inj)  40 mg  QAM


 SQ  2/22/17 09:00


 3/24/17 08:59  2/22/17 08:21


40 MG


 


 Insulin Aspart


  (novoLOG ASPART)  **SLIDING


 SCALE**


 **G...  ACHS


 SC  2/21/17 16:30


 3/23/17 16:29   


 


 


 Glucose


  (Glucose 40% Gel)  15-30


 GRAMS 15


 GRAMS...  UD  PRN


 PO  2/21/17 12:45


 3/23/17 12:44   


 


 


 Glucose


  (Glucose Chew


 Tab)  4-8


 Tablets 4


 Tabl...  UD  PRN


 PO  2/21/17 12:45


 3/23/17 12:44   


 


 


 Dextrose


  (Dextrose 50%


 50ML Syringe)  25-50ML OF


 50% DW IV


 FOR...  UD  PRN


 IV  2/21/17 12:45


 3/23/17 12:44   


 


 


 Glucagon


  (Glucagon Inj)  1 mg  UD  PRN


 SQ  2/21/17 12:45


 3/23/17 12:44   


 


 


 Hydralazine HCl


  (HydrALAZINE INJ)  10 mg  Q6  PRN


 IV.  2/21/17 12:45


 3/23/17 12:44   


 


 


 Miconazole Nitrate


  (Desenex Powder)  1 appln  BID  PRN


 EXT  2/21/17 20:15


 3/23/17 20:14   


 











Laboratory Data


Labs:





Last 24 Hours








Test


  2/22/17


05:53 2/22/17


07:29 2/22/17


11:15 2/22/17


16:28


 


White Blood Count 5.54 K/uL    


 


Red Blood Count 4.61 M/uL    


 


Hemoglobin 14.3 g/dL    


 


Hematocrit 42.9 %    


 


Mean Corpuscular Volume 93.1 fL    


 


Mean Corpuscular Hemoglobin 31.0 pg    


 


Mean Corpuscular Hemoglobin


Concent 33.3 g/dl 


  


  


  


 


 


RDW Standard Deviation 46.8 fL    


 


RDW Coefficient of Variation 13.8 %    


 


Platelet Count 177 K/uL    


 


Mean Platelet Volume 10.6 fL    


 


Sodium Level 137 mmol/L    


 


Potassium Level 3.6 mmol/L    


 


Chloride Level 102 mmol/L    


 


Carbon Dioxide Level 27 mmol/L    


 


Anion Gap 8.0 mmol/L    


 


Blood Urea Nitrogen 11 mg/dl    


 


Creatinine 1.10 mg/dl    


 


Est Creatinine Clear Calc


Drug Dose 66.4 ml/min 


  


  


  


 


 


Estimated GFR (


American) 75.7 


  


  


  


 


 


Estimated GFR (Non-


American 65.3 


  


  


  


 


 


BUN/Creatinine Ratio 9.9    


 


Random Glucose 91 mg/dl    


 


Estimated Average Glucose 123 mg/dl    


 


Hemoglobin A1c 5.9 %    


 


Calcium Level 8.3 mg/dl    


 


Total Creatine Kinase 3354 U/L    


 


Vitamin B12 Level 304 pg/mL    


 


Bedside Glucose  79 mg/dl  126 mg/dl  98 mg/dl 











Assessment and Plan


A 77 y/o male with PMHx of T2DM with Diabetic Neuropathy, HTN, Gout, GIULIA, DVT 

with PE (2014) off anticoagulation who presents to the ED c/o generalized arm 

and leg weakness and numbness/tingling that started at 0230. In the ED, CT was 

negative for acute intracranial findings. CXR without evidence of 

consolidation. He is afebrile and normotensive. Labs largely unremarkable 

except for CK of 2844. UA with evidence of 3+ occult blood but negative for 

bacteria. 





Rhabdomyolysis:


cont NSS at 125 mL/hr


Trend CPK





Generalized Weakness and Recent Falls:


his may be multifactorial given hx diabetic neuropathy and current 

rhabdomyolysis. 


check B12 level, ESR, and CRP


PT/OT Evaluations. ,needs placement





T2DM with Diabetic Neuropathy: A1c 5.8 (October 2016)


Hold Metformin


SSI - goal 120-160 and correction factor 40


Gabapentin 300 mg QID





HTN:


Hold Dyazide and use Hydralazine PRN





fall: no new fx, just old rib fx, PT?OT evals





Gout:


Allopurinol 300 mg daily





GIULIA:


Patient may use own CPAP





DVT Prophylaxis:


Lovenox 40 mg SC daily





Code Status:


FULL RESUSCITATION





dispo: rehab

## 2017-02-22 NOTE — DIAGNOSTIC IMAGING REPORT
RIGHT ELBOW 3 VIEWS



HISTORY:      Fall. Right elbow laceration.



COMPARISON: None.



FINDINGS: Questionable cortical step-off at the radial head on the lateral view

is likely old given the associated cortication. There is no definite acute

fracture or dislocation. No elbow effusion.     No radiopaque foreign bodies.



IMPRESSION:  

No acute fracture or dislocation within the right elbow.







Electronically signed by:  Wale Sultana M.D.

2/22/2017 8:38 PM



Dictated Date/Time:  2/22/2017 8:36 PM

## 2017-02-22 NOTE — DIAGNOSTIC IMAGING REPORT
RIBS BILATERAL WITH PA CHEST



CLINICAL HISTORY: fall, fractures. Cough.



COMPARISON STUDY:  Chest 2/21/2017.



FINDINGS: Calcified granuloma within the right lower lobe. Otherwise, the lungs

are clear. No pleural effusions. No pneumothorax. The heart is stable in size.

Old, healed bilateral lower rib fractures. No definite acute rib fractures

identified.



IMPRESSION:  Old, healed bilateral lower rib fractures. No definite acute rib

fractures. No pneumothorax. 









Electronically signed by:  Wale Sultana M.D.

2/22/2017 8:42 PM



Dictated Date/Time:  2/22/2017 8:38 PM

## 2017-02-22 NOTE — PROGRESS NOTE
Progress Note


Date of Service


Feb 22, 2017.





Progress Note


Called by nurse approximately 21:59





Patient has reportedly had difficulty sleeping at nightime.  Patient and family 

notes he takes Clonazepam 0.25 mg HS at night for sleep





EMR reviewed and confirms that patient takes medication as noted above





Plan: Clonazepam 0.25 mg PO HS PRN for sleep added to patient's medications

## 2017-02-23 VITALS
DIASTOLIC BLOOD PRESSURE: 73 MMHG | TEMPERATURE: 98.78 F | SYSTOLIC BLOOD PRESSURE: 140 MMHG | HEART RATE: 67 BPM | OXYGEN SATURATION: 96 %

## 2017-02-23 VITALS
HEART RATE: 74 BPM | DIASTOLIC BLOOD PRESSURE: 76 MMHG | SYSTOLIC BLOOD PRESSURE: 185 MMHG | TEMPERATURE: 98.6 F | OXYGEN SATURATION: 94 %

## 2017-02-23 VITALS — OXYGEN SATURATION: 94 %

## 2017-02-23 VITALS — OXYGEN SATURATION: 95 %

## 2017-02-23 RX ADMIN — ALUMINUM HYDROXIDE, MAGNESIUM HYDROXIDE, AND DIMETHICONE PRN ML: 400; 400; 40 SUSPENSION ORAL at 22:50

## 2017-02-23 RX ADMIN — SODIUM CHLORIDE SCH MLS/HR: 900 INJECTION, SOLUTION INTRAVENOUS at 13:30

## 2017-02-23 RX ADMIN — GABAPENTIN SCH MG: 300 CAPSULE ORAL at 08:11

## 2017-02-23 RX ADMIN — GABAPENTIN SCH MG: 300 CAPSULE ORAL at 17:06

## 2017-02-23 RX ADMIN — GABAPENTIN SCH MG: 300 CAPSULE ORAL at 13:30

## 2017-02-23 RX ADMIN — ONDANSETRON PRN MG: 2 INJECTION INTRAMUSCULAR; INTRAVENOUS at 08:09

## 2017-02-23 RX ADMIN — GABAPENTIN SCH MG: 300 CAPSULE ORAL at 21:38

## 2017-02-23 RX ADMIN — ALLOPURINOL SCH MG: 300 TABLET ORAL at 08:11

## 2017-02-23 RX ADMIN — Medication SCH MG: at 08:11

## 2017-02-23 RX ADMIN — SODIUM CHLORIDE SCH MLS/HR: 900 INJECTION, SOLUTION INTRAVENOUS at 05:57

## 2017-02-23 RX ADMIN — INSULIN ASPART SCH UNITS: 100 INJECTION, SOLUTION INTRAVENOUS; SUBCUTANEOUS at 16:30

## 2017-02-23 RX ADMIN — SODIUM CHLORIDE SCH MLS/HR: 900 INJECTION, SOLUTION INTRAVENOUS at 20:17

## 2017-02-23 RX ADMIN — ALUMINUM HYDROXIDE, MAGNESIUM HYDROXIDE, AND DIMETHICONE PRN ML: 400; 400; 40 SUSPENSION ORAL at 07:22

## 2017-02-23 RX ADMIN — INSULIN ASPART SCH UNITS: 100 INJECTION, SOLUTION INTRAVENOUS; SUBCUTANEOUS at 21:00

## 2017-02-23 RX ADMIN — INSULIN ASPART SCH UNITS: 100 INJECTION, SOLUTION INTRAVENOUS; SUBCUTANEOUS at 13:00

## 2017-02-23 RX ADMIN — CLONAZEPAM PRN MG: 0.5 TABLET ORAL at 21:38

## 2017-02-23 RX ADMIN — ENOXAPARIN SODIUM SCH MG: 40 INJECTION SUBCUTANEOUS at 08:11

## 2017-02-23 RX ADMIN — INSULIN ASPART SCH UNITS: 100 INJECTION, SOLUTION INTRAVENOUS; SUBCUTANEOUS at 08:12

## 2017-02-23 NOTE — PROGRESS NOTE
Subjective


Subjective


Date of Service:


Feb 23, 2017.


Pt evaluation today including:  conversation w/ patient, physical exam, chart 

review, review of studies, review of inpatient medication list


Notes:


had upset stomach, did not sleep much





Problem List


Medical Problems:


(1) Rhabdomyolysis


Status: Acute  





(2) Weakness


Status: Acute  











Review of Systems


Constitutional:  No fever


ENT:  No hearing loss


Respiratory:  No cough


Cardiac:  No chest pain


Abdomen:  + diarrhea, + nausea, + vomiting, No pain


Male :  No dysuria


Neurologic:  No memory loss


Psychiatric:  No depression symptoms


Endo:  + fatigue





Physical Exam


Vital Signs


Vital Signs Past 24 Hours:











  Date Time  Temp Pulse Resp B/P Pulse Ox O2 Delivery O2 Flow Rate FiO2


 


2/23/17 08:39 37.0 74 24 185/76 94   


 


2/23/17 08:00     94 Room Air  


 


2/23/17 03:28     95   


 


2/22/17 23:34 37.2 75 18 161/85 95 Room Air  


 


2/22/17 16:20     95 Room Air  


 


2/22/17 16:08  72   95   


 


2/22/17 15:15 36.7 70 18 144/73 96 Room Air  











Physical Exam:


General Appearance:  WD/WN, no apparent distress


Eyes:  bilateral eyes normal inspection


ENT:  hearing grossly normal, pharynx normal


Neck:  supple, no JVD


Respiratory/Chest:  lungs clear


Cardiovascular:  regular rate, rhythm, no JVD


Abdomen:  normal bowel sounds, soft


Extremities:  normal range of motion


Neurologic/Psychiatric:  alert





Medications


Medications:





 Current Inpatient Medications








 Medications


  (Trade)  Dose


 Ordered  Sig/Kalpesh


 Route  Start Time


 Stop Time Status Last Admin


Dose Admin


 


 Acetaminophen


  (Tylenol Tab)  650 mg  Q4H  PRN


 PO  2/21/17 12:15


 3/23/17 12:14   


 


 


 Al Hydrox/Mg


 Hydrox/Simethicone


  (Maalox Max Susp)  15 ml  Q4H  PRN


 PO  2/21/17 12:15


 3/23/17 12:14  2/23/17 07:22


15 ML


 


 Magnesium


 Hydroxide


  (Milk Of


 Magnesia Susp)  30 ml  Q6H  PRN


 PO  2/21/17 12:15


 3/23/17 12:14   


 


 


 Polyethylene


  (Miralax Powder


 Packet)  17 gm  DAILY  PRN


 PO  2/21/17 12:15


 3/23/17 12:14  2/22/17 14:54


17 GM


 


 Ondansetron HCl


  (Zofran Inj)  4 mg  Q6H  PRN


 IV  2/21/17 12:15


 3/23/17 12:14  2/23/17 08:09


4 MG


 


 Allopurinol


  (Zyloprim Tab)  300 mg  DAILY


 PO  2/22/17 09:00


 3/24/17 08:59  2/23/17 08:11


300 MG


 


 Aspirin


  (Ecotrin Tab)  81 mg  DAILY


 PO  2/22/17 09:00


 3/24/17 08:59  2/23/17 08:11


81 MG


 


 Gabapentin 300 mg  300 mg  QID


 PO  2/21/17 17:00


 3/23/17 16:59  2/23/17 13:30


300 MG


 


 Sodium Chloride


  (Nss 1000ml)  1,000 ml @ 


 125 mls/hr  Q8H


 IV  2/21/17 12:30


 3/23/17 12:29  2/23/17 13:30


125 MLS/HR


 


 Enoxaparin Sodium


  (Lovenox Inj)  40 mg  QAM


 SQ  2/22/17 09:00


 3/24/17 08:59  2/23/17 08:11


40 MG


 


 Insulin Aspart


  (novoLOG ASPART)  **SLIDING


 SCALE**


 **G...  ACHS


 SC  2/21/17 16:30


 3/23/17 16:29   


 


 


 Glucose


  (Glucose 40% Gel)  15-30


 GRAMS 15


 GRAMS...  UD  PRN


 PO  2/21/17 12:45


 3/23/17 12:44   


 


 


 Glucose


  (Glucose Chew


 Tab)  4-8


 Tablets 4


 Tabl...  UD  PRN


 PO  2/21/17 12:45


 3/23/17 12:44   


 


 


 Dextrose


  (Dextrose 50%


 50ML Syringe)  25-50ML OF


 50% DW IV


 FOR...  UD  PRN


 IV  2/21/17 12:45


 3/23/17 12:44   


 


 


 Glucagon


  (Glucagon Inj)  1 mg  UD  PRN


 SQ  2/21/17 12:45


 3/23/17 12:44   


 


 


 Hydralazine HCl


  (HydrALAZINE INJ)  10 mg  Q6  PRN


 IV.  2/21/17 12:45


 3/23/17 12:44   


 


 


 Miconazole Nitrate


  (Desenex Powder)  1 appln  BID  PRN


 EXT  2/21/17 20:15


 3/23/17 20:14   


 


 


 Clonazepam


  (Klonopin Tab)  0.25 mg  HS  PRN


 PO  2/22/17 22:15


 3/24/17 22:14   


 











Laboratory Data


Labs:





Last 24 Hours








Test


  2/22/17


16:28 2/22/17


21:14 2/23/17


07:15 2/23/17


07:52


 


Bedside Glucose 98 mg/dl  104 mg/dl   105 mg/dl 


 


Total Creatine Kinase   2424 U/L  














Test


  2/23/17


11:28 


  


  


 


 


Bedside Glucose 119 mg/dl    











Assessment and Plan


A 75 y/o male with PMHx of T2DM with Diabetic Neuropathy, HTN, Gout, GIULIA, DVT 

with PE (2014) off anticoagulation who presents to the ED c/o generalized arm 

and leg weakness and numbness/tingling that started at 0230. In the ED, CT was 

negative for acute intracranial findings. CXR without evidence of 

consolidation. He is afebrile and normotensive. Labs largely unremarkable 

except for CK of 2844. UA with evidence of 3+ occult blood but negative for 

bacteria. 





Rhabdomyolysis: improving


cont NSS at 125 mL/hr


Trend CPK





n/v/d: GE? r/o cdiff, check stool for cdiff, cont IVF, check serial BMP





Generalized Weakness and Recent Falls:


his may be multifactorial given hx diabetic neuropathy and current 

rhabdomyolysis. 


PT/OT Evaluations. ,needs placement





T2DM with Diabetic Neuropathy: A1c 5.8 (October 2016)


Hold Metformin


SSI - goal 120-160 and correction factor 40


Gabapentin 300 mg QID





HTN:


Hold Dyazide and use Hydralazine PRN





fall: no new fx, just old rib fx, PT?OT evals





Gout:


Allopurinol 300 mg daily





GIULIA:


Patient may use own CPAP





DVT Prophylaxis:


Lovenox 40 mg SC daily





Code Status:


FULL RESUSCITATION





dispo: rehab when bed is available

## 2017-02-23 NOTE — DISCHARGE INSTRUCTIONS
Discharge Instructions


Admission


Reason for Admission:  Rhabdomolysis





Discharge


Discharge Diagnosis / Problem:  rhabdomyolisis





Discharge Goals


Goal(s):  Increase independence, Improve disease control, Diagnostic testing, 

Therapeutic intervention





Activity Recommendations


Activity Level:  Assistance Required


Therapies:  Physical Therapy, Occupational Therapy





.





Additional Information


Patient informed of condition:  Yes


Advance Directives:  Yes


DNR:  No


Level of Care:  Acute Rehab


Communicable Disease:  No


Prognosis:  Stable


Weaver Catheter:  No





Current Hospital Diet


Patient's current hospital diet: Diabetes Type 2 Diet





Discharge Diet


Recommended Diet:  Diabetes Type 2 Diet





Pending Studies


Studies pending at discharge:  no





Physician Orders On Transfer


POLST Discussion:  Not Applicable





Laboratory Results





 Hemoglobin A1c








Test


  2/22/17


05:53 Range/Units


 


 


Estimated Average Glucose 123   mg/dl


 


Hemoglobin A1c 5.9 H 4.5-5.6  %











Medical Emergencies








.


Who to Call and When:





Medical Emergencies:  If at any time you feel your situation is an emergency, 

please call 911 immediately.





.





Non-Emergent Contact


Non-Emergency issues call your:  Primary Care Provider


Call Non-Emergent contact if:  your pain is not controlled, you have any 

medication questions





.


.





"Provider Documentation" section prepared by Lázaro Newby.





Core Measure Problem


Core Measures:  None

## 2017-02-23 NOTE — DISCHARGE SUMMARY
Discharge Summary


Date of Service


Feb 23, 2017.





Discharge Summary


Admission Date:


Feb 21, 2017 at 12:30


Discharge Date:  Feb 23, 2017


Discharge Disposition:  Rehab


Principal Diagnosis:  rhabdomyolisis





Medication Reconciliation


Continued Medications:  


Allopurinol (Zyloprim) 300 Mg Tab


300 MG PO DAILY, TAB





Aspirin (Aspirin Ec) 81 Mg Tab


81 MG PO DAILY





Cholecalciferol (Vitamin D3) 2,000 Unit Cap


2000 UNITS PO DAILY for 90 Days, CAP 3 Refills





Clonazepam (Klonopin) 0.5 Mg Tab


0.25-0.5 MG PO HS PRN for Sleep, TAB





Gabapentin (Neurontin) 300 Mg Cap


300 MG PO QID, CAP





Metformin Hcl (Glucophage) 500 Mg Tab


500 MG PO BID, TAB





Polyethylene Glycol 3350 (Bulk (Polyethylene Glycol 3350) 1 Pow Pow


17 GM PO BID, #255 GM





Triamterene/Hctz (Dyazide 37.5MG/25MG)  Cap


0.5 TAB PO DAILY, CAP











Referrals At Discharge


Follow up Referrals:  


Physician Referral - Within 2 Weeks with Johnnie Reis M.D.








Discharge Exam


Review of Systems:  


   Constitutional:  No chills


   ENT:  No unusual epistaxis


   Respiratory:  No sputum


   Abdomen:  No nausea


   Genitourinary - Male:  No hematuria


   Psychiatric:  No depression symptoms


   Endocrine:  No fatigue


   Integumentary:  No rash


Physical Exam:  


   General Appearance:  no apparent distress


   Eyes:  EOMI


   ENT:  hearing grossly normal, pharynx normal


   Neck:  supple, no JVD


   Respiratory/Chest:  chest non-tender, normal breath sounds


   Cardiovascular:  no JVD, no murmur


   Abdomen / GI:  non tender, soft


   Extremities:  normal inspection, normal capillary refill


   Neurologic/Psychiatric:  alert


   Skin:  normal color





Hospital Course


A 75 y/o male with PMHx of T2DM with Diabetic Neuropathy, HTN, Gout, GIULIA, DVT 

with PE (2014) off anticoagulation who presents to the ED c/o generalized arm 

and leg weakness and numbness/tingling that started at 0230. In the ED, CT was 

negative for acute intracranial findings. CXR without evidence of 

consolidation. He is afebrile and normotensive. Labs largely unremarkable 

except for CK of 2844. UA with evidence of 3+ occult blood but negative for 

bacteria. 





Rhabdomyolysis: improving


cont NSS at 125 mL/hr


Trend CPK





n/v/d: GE, improved





Generalized Weakness and Recent Falls:


his may be multifactorial given hx diabetic neuropathy and current 

rhabdomyolysis. 


PT/OT Evaluations. ,needs placement





T2DM with Diabetic Neuropathy: A1c 5.8 (October 2016)


Hold Metformin


SSI - goal 120-160 and correction factor 40


Gabapentin 300 mg QID





HTN:


Hold Dyazide and use Hydralazine PRN





fall: no new fx, just old rib fx, PT?OT evals





Gout:


Allopurinol 300 mg daily





GIULIA:


Patient may use own CPAP





DVT Prophylaxis:


Lovenox 40 mg SC daily





Code Status:


FULL RESUSCITATION





dispo: d/c to rehab when bed is available


Total Time Spent:  Greater than 30 minutes


This includes examination of the patient, discharge planning, medication 

reconciliation, and communication with other providers.





Discharge Instructions


Please refer to the electronic Patient Visit Report (Discharge Instructions) 

for additional information.





Additional Copies To


Johnnie Reis M.D.

## 2017-02-24 VITALS
TEMPERATURE: 98.6 F | OXYGEN SATURATION: 94 % | DIASTOLIC BLOOD PRESSURE: 81 MMHG | HEART RATE: 66 BPM | SYSTOLIC BLOOD PRESSURE: 151 MMHG

## 2017-02-24 VITALS
SYSTOLIC BLOOD PRESSURE: 166 MMHG | HEART RATE: 66 BPM | DIASTOLIC BLOOD PRESSURE: 67 MMHG | OXYGEN SATURATION: 93 % | TEMPERATURE: 99.68 F

## 2017-02-24 VITALS — OXYGEN SATURATION: 93 %

## 2017-02-24 VITALS
HEART RATE: 61 BPM | DIASTOLIC BLOOD PRESSURE: 73 MMHG | OXYGEN SATURATION: 93 % | TEMPERATURE: 98.78 F | SYSTOLIC BLOOD PRESSURE: 163 MMHG

## 2017-02-24 LAB
ANION GAP SERPL CALC-SCNC: 9 MMOL/L (ref 3–11)
BUN SERPL-MCNC: 12 MG/DL (ref 7–18)
BUN/CREAT SERPL: 13.4 (ref 10–20)
CALCIUM SERPL-MCNC: 8.1 MG/DL (ref 8.5–10.1)
CHLORIDE SERPL-SCNC: 102 MMOL/L (ref 98–107)
CO2 SERPL-SCNC: 25 MMOL/L (ref 21–32)
CREAT CL PREDICTED SERPL C-G-VRATE: 84.9 ML/MIN
CREAT SERPL-MCNC: 0.86 MG/DL (ref 0.6–1.4)
EOSINOPHIL NFR BLD AUTO: 159 K/UL (ref 130–400)
GLUCOSE SERPL-MCNC: 128 MG/DL (ref 70–99)
HCT VFR BLD CALC: 40.7 % (ref 42–52)
MCH RBC QN AUTO: 30.8 PG (ref 25–34)
MCHC RBC AUTO-ENTMCNC: 32.9 G/DL (ref 32–36)
MCV RBC AUTO: 93.6 FL (ref 80–100)
PMV BLD AUTO: 10 FL (ref 7.4–10.4)
POTASSIUM SERPL-SCNC: 3.6 MMOL/L (ref 3.5–5.1)
RBC # BLD AUTO: 4.35 M/UL (ref 4.7–6.1)
SODIUM SERPL-SCNC: 136 MMOL/L (ref 136–145)
WBC # BLD AUTO: 4.99 K/UL (ref 4.8–10.8)

## 2017-02-24 RX ADMIN — METOCLOPRAMIDE SCH MG: 5 INJECTION, SOLUTION INTRAMUSCULAR; INTRAVENOUS at 23:32

## 2017-02-24 RX ADMIN — POLYETHYLENE GLYCOL 3350 PRN GM: 17 POWDER, FOR SOLUTION ORAL at 17:29

## 2017-02-24 RX ADMIN — ALLOPURINOL SCH MG: 300 TABLET ORAL at 10:37

## 2017-02-24 RX ADMIN — SODIUM CHLORIDE SCH MLS/HR: 900 INJECTION, SOLUTION INTRAVENOUS at 04:19

## 2017-02-24 RX ADMIN — METOCLOPRAMIDE SCH MG: 5 INJECTION, SOLUTION INTRAMUSCULAR; INTRAVENOUS at 17:30

## 2017-02-24 RX ADMIN — GABAPENTIN SCH MG: 300 CAPSULE ORAL at 17:29

## 2017-02-24 RX ADMIN — CLONAZEPAM PRN MG: 0.5 TABLET ORAL at 23:31

## 2017-02-24 RX ADMIN — INSULIN ASPART SCH UNITS: 100 INJECTION, SOLUTION INTRAVENOUS; SUBCUTANEOUS at 08:05

## 2017-02-24 RX ADMIN — SODIUM CHLORIDE SCH MLS/HR: 900 INJECTION, SOLUTION INTRAVENOUS at 20:15

## 2017-02-24 RX ADMIN — GABAPENTIN SCH MG: 300 CAPSULE ORAL at 12:09

## 2017-02-24 RX ADMIN — ALUMINUM HYDROXIDE, MAGNESIUM HYDROXIDE, AND DIMETHICONE PRN ML: 400; 400; 40 SUSPENSION ORAL at 04:40

## 2017-02-24 RX ADMIN — INSULIN ASPART SCH UNITS: 100 INJECTION, SOLUTION INTRAVENOUS; SUBCUTANEOUS at 11:54

## 2017-02-24 RX ADMIN — INSULIN ASPART SCH UNITS: 100 INJECTION, SOLUTION INTRAVENOUS; SUBCUTANEOUS at 20:53

## 2017-02-24 RX ADMIN — ONDANSETRON PRN MG: 2 INJECTION INTRAMUSCULAR; INTRAVENOUS at 08:07

## 2017-02-24 RX ADMIN — METOCLOPRAMIDE SCH MG: 5 INJECTION, SOLUTION INTRAMUSCULAR; INTRAVENOUS at 12:08

## 2017-02-24 RX ADMIN — Medication SCH MG: at 10:37

## 2017-02-24 RX ADMIN — INSULIN ASPART SCH UNITS: 100 INJECTION, SOLUTION INTRAVENOUS; SUBCUTANEOUS at 16:30

## 2017-02-24 RX ADMIN — ENOXAPARIN SODIUM SCH MG: 40 INJECTION SUBCUTANEOUS at 08:07

## 2017-02-24 RX ADMIN — SODIUM CHLORIDE SCH MLS/HR: 900 INJECTION, SOLUTION INTRAVENOUS at 12:08

## 2017-02-24 RX ADMIN — GABAPENTIN SCH MG: 300 CAPSULE ORAL at 10:37

## 2017-02-24 RX ADMIN — GABAPENTIN SCH MG: 300 CAPSULE ORAL at 21:09

## 2017-02-24 NOTE — DIAGNOSTIC IMAGING REPORT
[facility]



CLINICAL HISTORY: Small bowel obstruction    



COMPARISON STUDY:  4/29/2015 



FINDINGS: There is mild gastric distention. There is gas present within

nondilated colon. There is no significant small bowel dilatation.



IMPRESSION:  Mild gastric distention. 







Electronically signed by:  Tramaine Lui M.D.

2/24/2017 9:04 AM



Dictated Date/Time:  2/24/2017 9:03 AM

## 2017-02-24 NOTE — PROGRESS NOTE
Subjective


Subjective


Date of Service:


Feb 24, 2017.


Pt evaluation today including:  conversation w/ patient, physical exam, chart 

review, review of studies, review of inpatient medication list





Problem List


Medical Problems:


(1) Rhabdomyolysis


Status: Acute  





(2) Weakness


Status: Acute  











Review of Systems


Constitutional:  No fever


ENT:  No hearing loss


Respiratory:  No cough


Abdomen:  + nausea, No pain


Male :  No dysuria


Neurologic:  No memory loss


Psychiatric:  No depression symptoms


Endo:  No fatigue





Physical Exam


Vital Signs


Vital Signs Past 24 Hours:











  Date Time  Temp Pulse Resp B/P Pulse Ox O2 Delivery O2 Flow Rate FiO2


 


2/24/17 08:53     93 Room Air  


 


2/24/17 08:07 37.1 61 18 163/73 93 Room Air  


 


2/24/17 00:00      Room Air  





      BiPAP  


 


2/23/17 18:54      Room Air  


 


2/23/17 15:42 37.1 67 20 140/73 96   











Physical Exam:


General Appearance:  WD/WN, no apparent distress


Eyes:  bilateral eyes normal inspection


ENT:  hearing grossly normal, pharynx normal


Neck:  supple, no JVD


Respiratory/Chest:  chest non-tender, normal breath sounds


Cardiovascular:  no edema, no JVD


Abdomen:  normal bowel sounds, soft


Extremities:  normal range of motion, no pedal edema


Neurologic/Psychiatric:  alert, oriented x 3





Medications


Medications:





 Reported Home Medications








 Medications  Dose


 Route/Sig


 Max Daily Dose Days Date Category


 


 Vitamin D3


  (Cholecalciferol)


 2,000 Unit Cap  2,000 Units


 PO DAILY


   90 2/21/17 Reported


 


 Polyethylene


 Glycol 3350


  (Polyethylene


 Glycol 3350


  (Bulk) 1 Pow Pow  17 Gm


 PO BID


    2/21/17 Reported


 


 Aspirin Ec


  (Aspirin) 81 Mg


 Tab  81 Mg


 PO DAILY


    2/21/17 Reported


 


 Dyazide


 37.5MG/25MG


  (Triamterene/HCTZ)


 Cap  0.5 Tab


 PO DAILY


    7/25/14 Reported


 


 Glucophage


  (Metformin Hcl)


 500 Mg Tab  500 Mg


 PO BID


    7/25/14 Reported


 


 Neurontin


  (Gabapentin) 300


 Mg Cap  300 Mg


 PO QID


    7/25/14 Reported


 


 Klonopin


  (Clonazepam) 0.5


 Mg Tab  0.25-0.5 Mg


 PO HS PRN


    7/25/14 Reported


 


 Zyloprim


  (Allopurinol) 300


 Mg Tab  300 Mg


 PO DAILY


    7/25/14 Reported











Laboratory Data


Labs:





Last 24 Hours








Test


  2/23/17


11:28 2/23/17


16:35 2/23/17


20:04 2/24/17


07:37


 


Bedside Glucose 119 mg/dl  122 mg/dl  144 mg/dl  123 mg/dl 














Test


  2/24/17


07:46 


  


  


 


 


White Blood Count 4.99 K/uL    


 


Red Blood Count 4.35 M/uL    


 


Hemoglobin 13.4 g/dL    


 


Hematocrit 40.7 %    


 


Mean Corpuscular Volume 93.6 fL    


 


Mean Corpuscular Hemoglobin 30.8 pg    


 


Mean Corpuscular Hemoglobin


Concent 32.9 g/dl 


  


  


  


 


 


RDW Standard Deviation 46.8 fL    


 


RDW Coefficient of Variation 13.7 %    


 


Platelet Count 159 K/uL    


 


Mean Platelet Volume 10.0 fL    


 


Sodium Level 136 mmol/L    


 


Potassium Level 3.6 mmol/L    


 


Chloride Level 102 mmol/L    


 


Carbon Dioxide Level 25 mmol/L    


 


Anion Gap 9.0 mmol/L    


 


Blood Urea Nitrogen 12 mg/dl    


 


Creatinine 0.86 mg/dl    


 


Est Creatinine Clear Calc


Drug Dose 84.9 ml/min 


  


  


  


 


 


Estimated GFR (


American) 98.3 


  


  


  


 


 


Estimated GFR (Non-


American 84.8 


  


  


  


 


 


BUN/Creatinine Ratio 13.4    


 


Random Glucose 128 mg/dl    


 


Calcium Level 8.1 mg/dl    


 


Total Creatine Kinase 1081 U/L    











Assessment and Plan


A 75 y/o male with PMHx of T2DM with Diabetic Neuropathy, HTN, Gout, GIULIA, DVT 

with PE (2014) off anticoagulation who presents to the ED c/o generalized arm 

and leg weakness and numbness/tingling that started at 0230. In the ED, CT was 

negative for acute intracranial findings. CXR without evidence of 

consolidation. He is afebrile and normotensive. Labs largely unremarkable 

except for CK of 2844. UA with evidence of 3+ occult blood but negative for 

bacteria. 





Rhabdomyolysis: improving


cont NSS at 125 mL/hr


Trend CPK





nausea, distended stomach, seems like diabetic gastroparesis, start IV reglan 

q6h, zofran prn, clear diet





Generalized Weakness and Recent Falls:


his may be multifactorial given hx diabetic neuropathy and current 

rhabdomyolysis. 


PT/OT Evaluations. ,needs placement





T2DM with Diabetic Neuropathy: A1c 5.8 (October 2016)


Hold Metformin


SSI - goal 120-160 and correction factor 40


Gabapentin 300 mg QID





HTN:


Hold Dyazide and use Hydralazine PRN





fall: no new fx, just old rib fx, PT?OT evals





Gout:


Allopurinol 300 mg daily





GIULIA:


Patient may use own CPAP





DVT Prophylaxis:


Lovenox 40 mg SC daily





Code Status:


FULL RESUSCITATION





dispo: d/c to rehab hopefully over the weekend

## 2017-02-25 VITALS
HEART RATE: 64 BPM | DIASTOLIC BLOOD PRESSURE: 62 MMHG | SYSTOLIC BLOOD PRESSURE: 112 MMHG | OXYGEN SATURATION: 93 % | TEMPERATURE: 100.94 F

## 2017-02-25 VITALS — TEMPERATURE: 99.32 F

## 2017-02-25 VITALS
HEART RATE: 63 BPM | SYSTOLIC BLOOD PRESSURE: 132 MMHG | DIASTOLIC BLOOD PRESSURE: 86 MMHG | OXYGEN SATURATION: 94 % | TEMPERATURE: 98.96 F

## 2017-02-25 VITALS — SYSTOLIC BLOOD PRESSURE: 117 MMHG | DIASTOLIC BLOOD PRESSURE: 52 MMHG

## 2017-02-25 VITALS — TEMPERATURE: 98.42 F

## 2017-02-25 VITALS — OXYGEN SATURATION: 93 %

## 2017-02-25 RX ADMIN — METOCLOPRAMIDE SCH MG: 5 INJECTION, SOLUTION INTRAMUSCULAR; INTRAVENOUS at 12:42

## 2017-02-25 RX ADMIN — INSULIN ASPART SCH UNITS: 100 INJECTION, SOLUTION INTRAVENOUS; SUBCUTANEOUS at 20:56

## 2017-02-25 RX ADMIN — POLYETHYLENE GLYCOL 3350 PRN GM: 17 POWDER, FOR SOLUTION ORAL at 16:24

## 2017-02-25 RX ADMIN — GABAPENTIN SCH MG: 300 CAPSULE ORAL at 17:25

## 2017-02-25 RX ADMIN — METOCLOPRAMIDE SCH MG: 5 INJECTION, SOLUTION INTRAMUSCULAR; INTRAVENOUS at 17:26

## 2017-02-25 RX ADMIN — Medication SCH MG: at 08:38

## 2017-02-25 RX ADMIN — INSULIN ASPART SCH UNITS: 100 INJECTION, SOLUTION INTRAVENOUS; SUBCUTANEOUS at 11:00

## 2017-02-25 RX ADMIN — GABAPENTIN SCH MG: 300 CAPSULE ORAL at 08:42

## 2017-02-25 RX ADMIN — METOCLOPRAMIDE SCH MG: 5 INJECTION, SOLUTION INTRAMUSCULAR; INTRAVENOUS at 06:00

## 2017-02-25 RX ADMIN — CLONAZEPAM PRN MG: 0.5 TABLET ORAL at 20:55

## 2017-02-25 RX ADMIN — GABAPENTIN SCH MG: 300 CAPSULE ORAL at 12:42

## 2017-02-25 RX ADMIN — ENOXAPARIN SODIUM SCH MG: 40 INJECTION SUBCUTANEOUS at 08:38

## 2017-02-25 RX ADMIN — GABAPENTIN SCH MG: 300 CAPSULE ORAL at 20:55

## 2017-02-25 RX ADMIN — INSULIN ASPART SCH UNITS: 100 INJECTION, SOLUTION INTRAVENOUS; SUBCUTANEOUS at 06:30

## 2017-02-25 RX ADMIN — SODIUM CHLORIDE SCH MLS/HR: 900 INJECTION, SOLUTION INTRAVENOUS at 04:37

## 2017-02-25 RX ADMIN — ALLOPURINOL SCH MG: 300 TABLET ORAL at 08:38

## 2017-02-25 RX ADMIN — INSULIN ASPART SCH UNITS: 100 INJECTION, SOLUTION INTRAVENOUS; SUBCUTANEOUS at 16:30

## 2017-02-25 NOTE — PROGRESS NOTE
Subjective


Subjective


Date of Service:


Feb 25, 2017.


Pt evaluation today including:  conversation w/ patient, physical exam, chart 

review, review of studies, review of inpatient medication list





Problem List


Medical Problems:


(1) Rhabdomyolysis


Status: Acute  





(2) Weakness


Status: Acute  











Review of Systems


Constitutional:  No fever


ENT:  No hearing loss


Respiratory:  No cough


Cardiac:  No chest pain


Abdomen:  No nausea, No pain


Male :  No dysuria


Psychiatric:  No depression symptoms





Physical Exam


Vital Signs


Vital Signs Past 24 Hours:











  Date Time  Temp Pulse Resp B/P Pulse Ox O2 Delivery O2 Flow Rate FiO2


 


2/25/17 08:28 36.9       


 


2/25/17 08:16 38.3 64 22 112/62 93 Room Air  


 


2/25/17 08:00      Room Air  


 


2/25/17 02:00    117/52    


 


2/25/17 00:00     93 BiPAP  


 


2/24/17 23:23 37.6 66 20 166/67 93 Room Air  


 


2/24/17 16:00      Room Air  


 


2/24/17 15:24 37.0 66 20 151/81 94   











Physical Exam:


General Appearance:  WD/WN, no apparent distress


Eyes:  bilateral eyes normal inspection


ENT:  hearing grossly normal, pharynx normal


Neck:  supple, no JVD


Respiratory/Chest:  lungs clear, no accessory muscle use


Cardiovascular:  no edema, no murmur


Abdomen:  non tender, soft


Extremities:  normal inspection, no pedal edema


Neurologic/Psychiatric:  alert, oriented x 3





Medications


Medications:





 Current Inpatient Medications








 Medications


  (Trade)  Dose


 Ordered  Sig/Kalpesh


 Route  Start Time


 Stop Time Status Last Admin


Dose Admin


 


 Acetaminophen


  (Tylenol Tab)  650 mg  Q4H  PRN


 PO  2/21/17 12:15


 3/23/17 12:14   


 


 


 Al Hydrox/Mg


 Hydrox/Simethicone


  (Maalox Max Susp)  15 ml  Q4H  PRN


 PO  2/21/17 12:15


 3/23/17 12:14  2/24/17 04:40


15 ML


 


 Magnesium


 Hydroxide


  (Milk Of


 Magnesia Susp)  30 ml  Q6H  PRN


 PO  2/21/17 12:15


 3/23/17 12:14   


 


 


 Polyethylene


  (Miralax Powder


 Packet)  17 gm  DAILY  PRN


 PO  2/21/17 12:15


 3/23/17 12:14  2/24/17 17:29


17 GM


 


 Ondansetron HCl


  (Zofran Inj)  4 mg  Q6H  PRN


 IV  2/21/17 12:15


 3/23/17 12:14  2/24/17 08:07


4 MG


 


 Allopurinol


  (Zyloprim Tab)  300 mg  DAILY


 PO  2/22/17 09:00


 3/24/17 08:59  2/25/17 08:38


300 MG


 


 Aspirin


  (Ecotrin Tab)  81 mg  DAILY


 PO  2/22/17 09:00


 3/24/17 08:59  2/25/17 08:38


81 MG


 


 Gabapentin


  (Neurontin Cap)  300 mg  QID


 PO  2/21/17 17:00


 3/23/17 16:59  2/25/17 12:42


300 MG


 


 Enoxaparin Sodium


  (Lovenox Inj)  40 mg  QAM


 SQ  2/22/17 09:00


 3/24/17 08:59  2/25/17 08:38


40 MG


 


 Insulin Aspart


  (novoLOG ASPART)  **SLIDING


 SCALE**


 **G...  ACHS


 SC  2/21/17 16:30


 3/23/17 16:29   


 


 


 Glucose


  (Glucose 40% Gel)  15-30


 GRAMS 15


 GRAMS...  UD  PRN


 PO  2/21/17 12:45


 3/23/17 12:44   


 


 


 Glucose


  (Glucose Chew


 Tab)  4-8


 Tablets 4


 Tabl...  UD  PRN


 PO  2/21/17 12:45


 3/23/17 12:44   


 


 


 Dextrose


  (Dextrose 50%


 50ML Syringe)  25-50ML OF


 50% DW IV


 FOR...  UD  PRN


 IV  2/21/17 12:45


 3/23/17 12:44   


 


 


 Glucagon


  (Glucagon Inj)  1 mg  UD  PRN


 SQ  2/21/17 12:45


 3/23/17 12:44   


 


 


 Hydralazine HCl


  (HydrALAZINE INJ)  10 mg  Q6  PRN


 IV.  2/21/17 12:45


 3/23/17 12:44   


 


 


 Miconazole Nitrate


  (Desenex Powder)  1 appln  BID  PRN


 EXT  2/21/17 20:15


 3/23/17 20:14   


 


 


 Clonazepam


  (Klonopin Tab)  0.25 mg  HS  PRN


 PO  2/22/17 22:15


 3/24/17 22:14  2/24/17 23:31


0.25 MG


 


 Metoclopramide HCl


  (Reglan Inj)  10 mg  Q6H


 IV.  2/24/17 12:00


 3/26/17 10:59  2/25/17 12:42


10 MG











Laboratory Data


Labs:





Last 24 Hours








Test


  2/24/17


16:48 2/24/17


19:44 2/25/17


11:24


 


Bedside Glucose 95 mg/dl  100 mg/dl  99 mg/dl 











Assessment and Plan


A 75 y/o male with PMHx of T2DM with Diabetic Neuropathy, HTN, Gout, GIULIA, DVT 

with PE (2014) off anticoagulation who presents to the ED c/o generalized arm 

and leg weakness and numbness/tingling that started at 0230. In the ED, CT was 

negative for acute intracranial findings. CXR without evidence of 

consolidation. He is afebrile and normotensive. Labs largely unremarkable 

except for CK of 2844. UA with evidence of 3+ occult blood but negative for 

bacteria. 





Rhabdomyolysis: improved with IVF





nausea, distended stomach, seems like diabetic gastroparesis, improved with IV 

reglan q6h, zofran prn, advanced diet 





Generalized Weakness and Recent Falls:


his may be multifactorial given hx diabetic neuropathy and current 

rhabdomyolysis. 


PT/OT Evaluations. ,needs placement





T2DM with Diabetic Neuropathy: A1c 5.8 (October 2016)


Hold Metformin


SSI - goal 120-160 and correction factor 40


Gabapentin 300 mg QID





HTN:


Hold Dyazide and use Hydralazine PRN





fall: no new fx, just old rib fx, PT?OT evals





Gout:


Allopurinol 300 mg daily





GIULIA:


Patient may use own CPAP





DVT Prophylaxis:


Lovenox 40 mg SC daily





Code Status:


FULL RESUSCITATION





dispo: d/c to [facility] monday

## 2017-02-26 VITALS — OXYGEN SATURATION: 95 %

## 2017-02-26 VITALS
TEMPERATURE: 98.06 F | SYSTOLIC BLOOD PRESSURE: 133 MMHG | DIASTOLIC BLOOD PRESSURE: 76 MMHG | HEART RATE: 68 BPM | OXYGEN SATURATION: 95 %

## 2017-02-26 VITALS
OXYGEN SATURATION: 94 % | TEMPERATURE: 98.6 F | DIASTOLIC BLOOD PRESSURE: 79 MMHG | SYSTOLIC BLOOD PRESSURE: 160 MMHG | HEART RATE: 60 BPM

## 2017-02-26 VITALS
HEART RATE: 60 BPM | TEMPERATURE: 98.6 F | SYSTOLIC BLOOD PRESSURE: 139 MMHG | DIASTOLIC BLOOD PRESSURE: 68 MMHG | OXYGEN SATURATION: 94 %

## 2017-02-26 RX ADMIN — ENOXAPARIN SODIUM SCH MG: 40 INJECTION SUBCUTANEOUS at 08:25

## 2017-02-26 RX ADMIN — METOCLOPRAMIDE SCH MG: 5 INJECTION, SOLUTION INTRAMUSCULAR; INTRAVENOUS at 12:29

## 2017-02-26 RX ADMIN — INSULIN ASPART SCH UNITS: 100 INJECTION, SOLUTION INTRAVENOUS; SUBCUTANEOUS at 11:00

## 2017-02-26 RX ADMIN — GABAPENTIN SCH MG: 300 CAPSULE ORAL at 21:21

## 2017-02-26 RX ADMIN — INSULIN ASPART SCH UNITS: 100 INJECTION, SOLUTION INTRAVENOUS; SUBCUTANEOUS at 20:51

## 2017-02-26 RX ADMIN — INSULIN ASPART SCH UNITS: 100 INJECTION, SOLUTION INTRAVENOUS; SUBCUTANEOUS at 06:30

## 2017-02-26 RX ADMIN — METOCLOPRAMIDE SCH MG: 5 INJECTION, SOLUTION INTRAMUSCULAR; INTRAVENOUS at 16:27

## 2017-02-26 RX ADMIN — ALLOPURINOL SCH MG: 300 TABLET ORAL at 08:25

## 2017-02-26 RX ADMIN — GABAPENTIN SCH MG: 300 CAPSULE ORAL at 12:30

## 2017-02-26 RX ADMIN — METOCLOPRAMIDE SCH MG: 5 INJECTION, SOLUTION INTRAMUSCULAR; INTRAVENOUS at 05:40

## 2017-02-26 RX ADMIN — METOCLOPRAMIDE SCH MG: 5 INJECTION, SOLUTION INTRAMUSCULAR; INTRAVENOUS at 00:19

## 2017-02-26 RX ADMIN — GABAPENTIN SCH MG: 300 CAPSULE ORAL at 16:26

## 2017-02-26 RX ADMIN — GABAPENTIN SCH MG: 300 CAPSULE ORAL at 08:25

## 2017-02-26 RX ADMIN — INSULIN ASPART SCH UNITS: 100 INJECTION, SOLUTION INTRAVENOUS; SUBCUTANEOUS at 16:27

## 2017-02-26 RX ADMIN — Medication SCH MG: at 08:24

## 2017-02-26 NOTE — PROGRESS NOTE
Subjective


Subjective


Date of Service:


Feb 26, 2017.


Pt evaluation today including:  conversation w/ patient, physical exam, chart 

review, review of studies, review of inpatient medication list





Problem List


Medical Problems:


(1) Rhabdomyolysis


Status: Acute  





(2) Weakness


Status: Acute  











Review of Systems


Constitutional:  No fever


ENT:  No hearing loss


Respiratory:  No cough


Abdomen:  No constipation, No diarrhea, No nausea, No pain, No vomiting


Male :  No dysuria


Neurologic:  No memory loss


Psychiatric:  No depression symptoms





Physical Exam


Vital Signs


Vital Signs Past 24 Hours:











  Date Time  Temp Pulse Resp B/P Pulse Ox O2 Delivery O2 Flow Rate FiO2


 


2/26/17 08:00      Room Air  


 


2/26/17 07:32 37.0 60 18 160/79 94   


 


2/26/17 00:30      CPAP  


 


2/26/17 00:12 37.0 60 18 139/68 94 BiPAP  


 


2/25/17 20:55 37.4       


 


2/25/17 16:00      Room Air  


 


2/25/17 15:08 37.2 63 24 132/86 94 Room Air  











Physical Exam:


General Appearance:  WD/WN, no apparent distress


Eyes:  bilateral eyes normal inspection


ENT:  hearing grossly normal, pharynx normal


Neck:  supple, no JVD


Respiratory/Chest:  lungs clear, normal breath sounds


Cardiovascular:  regular rate, rhythm, no edema


Abdomen:  non tender, soft


Extremities:  non-tender, no pedal edema


Neurologic/Psychiatric:  alert, normal mood/affect


Skin:  normal color, no rash





Medications


Medications:





 Current Inpatient Medications








 Medications


  (Trade)  Dose


 Ordered  Sig/Kalpesh


 Route  Start Time


 Stop Time Status Last Admin


Dose Admin


 


 Acetaminophen


  (Tylenol Tab)  650 mg  Q4H  PRN


 PO  2/21/17 12:15


 3/23/17 12:14  2/25/17 20:54


650 MG


 


 Al Hydrox/Mg


 Hydrox/Simethicone


  (Maalox Max Susp)  15 ml  Q4H  PRN


 PO  2/21/17 12:15


 3/23/17 12:14  2/24/17 04:40


15 ML


 


 Magnesium


 Hydroxide


  (Milk Of


 Magnesia Susp)  30 ml  Q6H  PRN


 PO  2/21/17 12:15


 3/23/17 12:14   


 


 


 Polyethylene


  (Miralax Powder


 Packet)  17 gm  DAILY  PRN


 PO  2/21/17 12:15


 3/23/17 12:14  2/25/17 16:24


17 GM


 


 Ondansetron HCl


  (Zofran Inj)  4 mg  Q6H  PRN


 IV  2/21/17 12:15


 3/23/17 12:14  2/24/17 08:07


4 MG


 


 Allopurinol


  (Zyloprim Tab)  300 mg  DAILY


 PO  2/22/17 09:00


 3/24/17 08:59  2/26/17 08:25


300 MG


 


 Aspirin


  (Ecotrin Tab)  81 mg  DAILY


 PO  2/22/17 09:00


 3/24/17 08:59  2/26/17 08:24


81 MG


 


 Gabapentin


  (Neurontin Cap)  300 mg  QID


 PO  2/21/17 17:00


 3/23/17 16:59  2/26/17 08:25


300 MG


 


 Enoxaparin Sodium


  (Lovenox Inj)  40 mg  QAM


 SQ  2/22/17 09:00


 3/24/17 08:59  2/26/17 08:25


40 MG


 


 Insulin Aspart


  (novoLOG ASPART)  **SLIDING


 SCALE**


 **G...  ACHS


 SC  2/21/17 16:30


 3/23/17 16:29   


 


 


 Glucose


  (Glucose 40% Gel)  15-30


 GRAMS 15


 GRAMS...  UD  PRN


 PO  2/21/17 12:45


 3/23/17 12:44   


 


 


 Glucose


  (Glucose Chew


 Tab)  4-8


 Tablets 4


 Tabl...  UD  PRN


 PO  2/21/17 12:45


 3/23/17 12:44   


 


 


 Dextrose


  (Dextrose 50%


 50ML Syringe)  25-50ML OF


 50% DW IV


 FOR...  UD  PRN


 IV  2/21/17 12:45


 3/23/17 12:44   


 


 


 Glucagon


  (Glucagon Inj)  1 mg  UD  PRN


 SQ  2/21/17 12:45


 3/23/17 12:44   


 


 


 Hydralazine HCl


  (HydrALAZINE INJ)  10 mg  Q6  PRN


 IV.  2/21/17 12:45


 3/23/17 12:44   


 


 


 Miconazole Nitrate


  (Desenex Powder)  1 appln  BID  PRN


 EXT  2/21/17 20:15


 3/23/17 20:14   


 


 


 Clonazepam


  (Klonopin Tab)  0.25 mg  HS  PRN


 PO  2/22/17 22:15


 3/24/17 22:14  2/25/17 20:55


0.25 MG


 


 Metoclopramide HCl


  (Reglan Inj)  10 mg  Q6H


 IV.  2/24/17 12:00


 3/26/17 10:59  2/26/17 05:40


10 MG











Laboratory Data


Labs:





Last 24 Hours








Test


  2/25/17


11:24 2/25/17


16:32 2/25/17


20:16 2/26/17


07:30


 


Bedside Glucose 99 mg/dl  89 mg/dl  93 mg/dl  82 mg/dl 











Assessment and Plan


A 75 y/o male with PMHx of T2DM with Diabetic Neuropathy, HTN, Gout, GIULIA, DVT 

with PE (2014) off anticoagulation who presents to the ED c/o generalized arm 

and leg weakness and numbness/tingling that started at 0230. In the ED, CT was 

negative for acute intracranial findings. CXR without evidence of 

consolidation. He is afebrile and normotensive. Labs largely unremarkable 

except for CK of 2844. UA with evidence of 3+ occult blood but negative for 

bacteria. 





Rhabdomyolysis: improved with IVF





nausea, distended stomach, seems like diabetic gastroparesis, improved with IV 

reglan q6h, zofran prn, advanced diet, may discharge on po reglan q8h when 

patient is ready





Generalized Weakness and Recent Falls:


his may be multifactorial given hx diabetic neuropathy and current 

rhabdomyolysis. 


PT/OT Evaluations. needs placement





T2DM with Diabetic Neuropathy: A1c 5.8 (October 2016)


Hold Metformin


SSI - goal 120-160 and correction factor 40


Gabapentin 300 mg QID





HTN:


Hold Dyazide and use Hydralazine PRN





fall: no new fx, just old rib fx, PT?OT evals





Gout:


Allopurinol 300 mg daily





GIULIA:


Patient may use own CPAP





DVT Prophylaxis:


Lovenox 40 mg SC daily





Code Status:


FULL RESUSCITATION





dispo: d/c to [facility], hopefully on monday

## 2017-02-27 VITALS
TEMPERATURE: 99.5 F | SYSTOLIC BLOOD PRESSURE: 148 MMHG | DIASTOLIC BLOOD PRESSURE: 69 MMHG | HEART RATE: 64 BPM | OXYGEN SATURATION: 95 %

## 2017-02-27 VITALS
DIASTOLIC BLOOD PRESSURE: 82 MMHG | SYSTOLIC BLOOD PRESSURE: 140 MMHG | TEMPERATURE: 98.6 F | OXYGEN SATURATION: 95 % | HEART RATE: 62 BPM

## 2017-02-27 VITALS
OXYGEN SATURATION: 95 % | DIASTOLIC BLOOD PRESSURE: 82 MMHG | HEART RATE: 55 BPM | SYSTOLIC BLOOD PRESSURE: 140 MMHG | TEMPERATURE: 98.6 F

## 2017-02-27 VITALS — OXYGEN SATURATION: 95 %

## 2017-02-27 LAB
CREAT CL PREDICTED SERPL C-G-VRATE: 66.4 ML/MIN
CREAT SERPL-MCNC: 1.1 MG/DL (ref 0.6–1.4)
EOSINOPHIL NFR BLD AUTO: 143 K/UL (ref 130–400)
HCT VFR BLD CALC: 39.1 % (ref 42–52)
MCH RBC QN AUTO: 30.1 PG (ref 25–34)
MCHC RBC AUTO-ENTMCNC: 33.2 G/DL (ref 32–36)
MCV RBC AUTO: 90.5 FL (ref 80–100)
PMV BLD AUTO: 10.3 FL (ref 7.4–10.4)
RBC # BLD AUTO: 4.32 M/UL (ref 4.7–6.1)
WBC # BLD AUTO: 4.42 K/UL (ref 4.8–10.8)

## 2017-02-27 RX ADMIN — ALLOPURINOL SCH MG: 300 TABLET ORAL at 07:44

## 2017-02-27 RX ADMIN — ENOXAPARIN SODIUM SCH MG: 40 INJECTION SUBCUTANEOUS at 07:45

## 2017-02-27 RX ADMIN — INSULIN ASPART SCH UNITS: 100 INJECTION, SOLUTION INTRAVENOUS; SUBCUTANEOUS at 06:30

## 2017-02-27 RX ADMIN — METOCLOPRAMIDE SCH MG: 5 INJECTION, SOLUTION INTRAMUSCULAR; INTRAVENOUS at 06:22

## 2017-02-27 RX ADMIN — GABAPENTIN SCH MG: 300 CAPSULE ORAL at 07:44

## 2017-02-27 RX ADMIN — GABAPENTIN SCH MG: 300 CAPSULE ORAL at 13:40

## 2017-02-27 RX ADMIN — METOCLOPRAMIDE SCH MG: 5 INJECTION, SOLUTION INTRAMUSCULAR; INTRAVENOUS at 11:55

## 2017-02-27 RX ADMIN — Medication SCH MG: at 07:44

## 2017-02-27 RX ADMIN — METOCLOPRAMIDE SCH MG: 5 INJECTION, SOLUTION INTRAMUSCULAR; INTRAVENOUS at 00:37

## 2017-02-27 RX ADMIN — INSULIN ASPART SCH UNITS: 100 INJECTION, SOLUTION INTRAVENOUS; SUBCUTANEOUS at 11:00

## 2017-02-27 RX ADMIN — CLONAZEPAM PRN MG: 0.5 TABLET ORAL at 00:36

## 2017-02-27 NOTE — DIAGNOSTIC IMAGING REPORT
ULTRASOUND BILATERAL LOWER EXTREMITY VENOUS 



CLINICAL HISTORY: Lower extremity edema.



COMPARISON STUDY: Bilateral lower extremity venous ultrasound dated 9/19/2014.



TECHNIQUE: Real-time, grayscale, and color Doppler sonography of the deep veins

of the right and left lower extremity was performed from the inguinal crease to

the calf. Compression and augmentation were utilized. 



FINDINGS: There is no sonographic evidence of deep venous thrombosis identified

in the right or left lower extremity. The common femoral, superficial femoral,

and popliteal veins are patent and normally compressible bilaterally. The

greater saphenous vein and the profunda femoris vein at the junction with the

common femoral vein are clear in both legs. The visualized calf veins are patent

bilaterally.



IMPRESSION: There is no sonographic evidence of deep venous thrombosis

identified in the right or left lower extremity.







Electronically signed by:  Arcenio Doshi M.D.

2/27/2017 1:29 PM



Dictated Date/Time:  2/27/2017 1:28 PM

## 2017-02-27 NOTE — DISCHARGE INSTRUCTIONS
Discharge Instructions


Admission


Reason for Admission:  Rhabdomolysis





Discharge


Discharge Diagnosis / Problem:  Rhabdomyolysis, weakness





Discharge Goals


Goal(s):  Improve disease control, Therapeutic intervention





Activity Recommendations


Activity Level:  Assistance Required


Therapies:  Physical Therapy, Occupational Therapy





.





Additional Information


Patient informed of condition:  Yes


Advance Directives:  Yes


DNR:  No


Level of Care:  Skilled


Communicable Disease:  No


Prognosis:  Stable


Oxygen at (LPM):  CPAP qhs


Weaver Catheter:  No





Instructions / Follow-Up


Instructions / Follow-Up


A 75 y/o male with PMHx of T2DM with Diabetic Neuropathy, HTN, Gout, GIULIA, DVT 

with PE (2014) off anticoagulation who presents to the ED c/o generalized arm 

and leg weakness and numbness/tingling that started at 0230 on the day of 

admission. In the ED, CT was negative for acute intracranial findings. CXR 

without evidence of consolidation. He is afebrile and normotensive. Labs 

largely unremarkable except for CK of 2844. UA with evidence of 3+ occult blood 

but negative for bacteria. 





Rhabdomyolysis, Generalized Weakness and Recent Falls: Rhabdo improved with IVF

, CK down to 1000


Has had progressive weakness and falls the last 1-2 months


-If not improving with PT/OT at rehab, recommend outpatient consultation with 

Neurology


his may be multifactorial given hx diabetic neuropathy and current 

rhabdomyolysis. 


PT/OT Evaluations. needs placement


No new fx, just old rib fx on imaging





Nausea, distended stomach, seems like diabetic gastroparesis, improved with IV 

reglan q6h, zofran prn, advanced diet, may discharge on po reglan q8h 





T2DM with Diabetic Neuropathy: A1c 5.8 (October 2016)


Held Metformin but will restart on discharge


Gabapentin 300 mg QID





HTN:


Held Dyazide for possible dehydration on admission


-had some mild LE edema and dyazide should be restarted on discharge


-LE Bilateral Doppler negative for DVT





Gout:


Allopurinol 300 mg daily





GIULIA:


Patient may use own CPAP





Anxiety-uses clonazepam prn


-consider adding on SSRI as anxiety exacerbated by hospitalization





DVT Prophylaxis:


Lovenox 40 mg SC daily





Code Status:


FULL RESUSCITATION





dispo: d/c to [facility], hopefully on monday





Current Hospital Diet


Patient's current hospital diet: N/A, Diabetes Type 2 Diet





Discharge Diet


Recommended Diet:  Diabetes Type 2 Diet





Procedures


Procedures Performed:  


Bilateral Doppler Lower Extremities-negative


Head CT


Chest and rib xray


KUB


Elbow-right





Pending Studies


Studies pending at discharge:  no





Physician Orders On Transfer


Special Precautions:


Fall risk


Vital Signs:


routine


Weigh:


routine


Additional Orders:


Follow up with PCP within 1 week from discharge


Consider Neurology consultation if weakness not improving


POLST Discussion:  Not Applicable





Laboratory Results





 Hemoglobin A1c








Test


  2/22/17


05:53 Range/Units


 


 


Estimated Average Glucose 123   mg/dl


 


Hemoglobin A1c 5.9 H 4.5-5.6  %











Medical Emergencies








.


Who to Call and When:





Medical Emergencies:  If at any time you feel your situation is an emergency, 

please call 911 immediately.





.





Non-Emergent Contact


Non-Emergency issues call your:  Primary Care Provider





.


.





"Provider Documentation" section prepared by Mar Chandler.





Core Measure Problem


Core Measures:  None

## 2017-03-07 NOTE — DISCHARGE SUMMARY
Discharge Summary


Date of Service


Feb 27, 2017.





Discharge Summary


Admission Date:


Feb 21, 2017 at 12:30


Discharge Date:  Feb 27, 2017


Discharge Disposition:  Skilled nursing facility


Principal Diagnosis:  Rhabdomyolysis


Problems/Secondary Diagnoses:


T2DM with Diabetic Neuropathy


HTN


Gout


GIULIA on CPAP


H/o DVT with PE (2014) off anticoagulation 


Generalized Weakness and Recent Falls


Diabetic gastroparesis


Anxiety disorder


Procedures:


Head CT: No acute intracranial abnormality. 





CXR: Minimal platelike atelectasis left base. Otherwise negative study 





Rib xrays: Old, healed bilateral lower rib fractures. No definite acute rib


fractures. No pneumothorax





Right elbow xray: No acute fracture or dislocation within the right elbow





KUB: Mild gastric distention. 





Bilateral lower extremity venous Doppler: There is no sonographic evidence of 

deep venous thrombosis identified in the right or left lower extremity


Consultations:


None





Medication Reconciliation


New Medications:  


Cyanocobalamin (Vitamin B12) 1,000 Mcg Tab


1000 MCG PO DAILY for 30 Days





Metoclopramide Hcl (Reglan) 10 Mg Tab


1 TAB PO TID for 30 Days, #90 TAB


with meals


Miconazole Nitrate (Desenex Shake Powder) 43 Appln/43 Gm Powd


1 APPLN EXT BID PRN for Affected Skin Folds for 30 Days





 


Continued Medications:  


Allopurinol (Zyloprim) 300 Mg Tab


300 MG PO DAILY, TAB





Aspirin (Aspirin Ec) 81 Mg Tab


81 MG PO DAILY





Cholecalciferol (Vitamin D3) 2,000 Unit Cap


2000 UNITS PO DAILY for 90 Days, CAP 3 Refills





Clonazepam (Klonopin) 0.5 Mg Tab


0.25-0.5 MG PO HS PRN for Sleep, #3 TAB (This prescription has been renewed)





Gabapentin (Neurontin) 300 Mg Cap


300 MG PO QID, CAP





Metformin Hcl (Glucophage) 500 Mg Tab


500 MG PO BID, TAB





Polyethylene Glycol 3350 (Bulk (Polyethylene Glycol 3350) 1 Pow Pow


17 GM PO BID, #255 GM





Triamterene/Hctz (Dyazide 37.5MG/25MG)  Cap


0.5 TAB PO DAILY, CAP











Referrals At Discharge


Follow up Referrals:  


Physician Referral - Within 2 Weeks with Johnnie Reis M.D.








Discharge Exam


PHYSICAL EXAM:


General Appearance:  WD/WN, no apparent distress


Eyes:  bilateral eyes normal inspection


ENT:  hearing grossly normal, pharynx normal


Neck:  supple, no JVD


Respiratory/Chest:  lungs clear, normal breath sounds


Cardiovascular:  regular rate, rhythm, no edema


Abdomen:  non tender, soft


Extremities:  non-tender, 1+ LE edema L>R


Neurologic/Psychiatric:  alert, tearful, anxious about situation


Skin:  normal color, no rash


Review of Systems:  


   Constitutional:  No fever


   Eyes:  No problem reported


   ENT:  No problem reported


   Respiratory:  No shortness of breath


   Cardiovascular:  No chest pain


   Abdomen:  No nausea, No pain, No vomiting


   Musculoskeletal:  + problem reported (generalized weakness), No muscle pain


   Genitourinary - Male:  No problem reported


   Neurologic:  No problem reported


   Psychiatric:  + anxiety


   Endocrine:  No problem reported


   Hematologic / Lymphatic:  No problem reported





Hospital Course


A 75 y/o male with PMHx of T2DM with Diabetic Neuropathy, HTN, Gout, GIULIA, DVT 

with PE (2014) off anticoagulation who presents to the ED c/o generalized arm 

and leg weakness and numbness/tingling that started at 0230 on the day of 

admission. In the ED, CT was negative for acute intracranial findings. CXR 

without evidence of consolidation. He is afebrile and normotensive. Labs 

largely unremarkable except for CK of 2844. UA with evidence of 3+ occult blood 

but negative for bacteria. 





Rhabdomyolysis, Generalized Weakness and Recent Falls: Rhabdo improved with IVF

, CK down to 1081 at last check 3 days prior to discharge


Has had progressive weakness and falls the last 1-2 months


-If not improving with PT/OT at rehab, recommend outpatient consultation with 

Neurology


his may be multifactorial given hx diabetic neuropathy and current 

rhabdomyolysis. 


PT/OT Evaluations. needs placement


No new fx, just old rib fx on imaging





Nausea, distended stomach, seems like diabetic gastroparesis, improved with IV 

reglan q6h, zofran prn, advanced diet, may discharge on po reglan q8h 





T2DM with Diabetic Neuropathy: A1c 5.8 (October 2016)


Held Metformin but will restart on discharge


Gabapentin 300 mg QID





HTN:


Held Dyazide for possible dehydration on admission


-had some mild LE edema and dyazide should be restarted on discharge


-LE Bilateral Doppler negative for DVT





Gout:


Allopurinol 300 mg daily





GIULIA:


Patient may use own CPAP





Anxiety-uses clonazepam prn


-consider adding on SSRI as anxiety exacerbated by hospitalization





DVT Prophylaxis:


Lovenox 40 mg SC daily





Code Status:


FULL RESUSCITATION





dispo: d/c to [place] [facility]


Total Time Spent:  Greater than 30 minutes


This includes examination of the patient, discharge planning, medication 

reconciliation, and communication with other providers.





Discharge Instructions


Please refer to the electronic Patient Visit Report (Discharge Instructions) 

for additional information.





Follow-Up


With PCP within 1 week





Additional Copies To


Johnnie Reis M.D.

## 2017-03-24 ENCOUNTER — HOSPITAL ENCOUNTER (OUTPATIENT)
Dept: HOSPITAL 45 - C.LABPVFM | Age: 77
Discharge: HOME | End: 2017-03-24
Attending: NURSE PRACTITIONER
Payer: COMMERCIAL

## 2017-03-24 DIAGNOSIS — Z87.39: Primary | ICD-10-CM

## 2017-03-24 LAB
ANION GAP SERPL CALC-SCNC: 5 MMOL/L (ref 3–11)
BASOPHILS # BLD: 0.02 K/UL (ref 0–0.2)
BASOPHILS NFR BLD: 0.2 %
BUN SERPL-MCNC: 20 MG/DL (ref 7–18)
BUN/CREAT SERPL: 15.5 (ref 10–20)
CALCIUM SERPL-MCNC: 9.6 MG/DL (ref 8.5–10.1)
CHLORIDE SERPL-SCNC: 103 MMOL/L (ref 98–107)
CO2 SERPL-SCNC: 31 MMOL/L (ref 21–32)
COMPLETE: YES
CREAT SERPL-MCNC: 1.3 MG/DL (ref 0.6–1.4)
EOSINOPHIL NFR BLD AUTO: 265 K/UL (ref 130–400)
GLUCOSE SERPL-MCNC: 100 MG/DL (ref 70–99)
HCT VFR BLD CALC: 44.8 % (ref 42–52)
IG%: 0.6 %
IMM GRANULOCYTES NFR BLD AUTO: 22.9 %
LYMPHOCYTES # BLD: 2.13 K/UL (ref 1.2–3.4)
MCH RBC QN AUTO: 30.5 PG (ref 25–34)
MCHC RBC AUTO-ENTMCNC: 32.4 G/DL (ref 32–36)
MCV RBC AUTO: 94.3 FL (ref 80–100)
MONOCYTES NFR BLD: 13.5 %
NEUTROPHILS # BLD AUTO: 1.5 %
NEUTROPHILS NFR BLD AUTO: 61.3 %
PMV BLD AUTO: 10.5 FL (ref 7.4–10.4)
POTASSIUM SERPL-SCNC: 4.1 MMOL/L (ref 3.5–5.1)
RBC # BLD AUTO: 4.75 M/UL (ref 4.7–6.1)
SODIUM SERPL-SCNC: 139 MMOL/L (ref 136–145)
WBC # BLD AUTO: 9.3 K/UL (ref 4.8–10.8)

## 2017-06-20 ENCOUNTER — HOSPITAL ENCOUNTER (OUTPATIENT)
Dept: HOSPITAL 45 - C.LABPVFM | Age: 77
Discharge: HOME | End: 2017-06-20
Attending: FAMILY MEDICINE
Payer: COMMERCIAL

## 2017-06-20 DIAGNOSIS — Z01.818: Primary | ICD-10-CM

## 2017-06-20 LAB
ALBUMIN/GLOB SERPL: 0.9 {RATIO} (ref 0.9–2)
ALP SERPL-CCNC: 103 U/L (ref 45–117)
ALT SERPL-CCNC: 20 U/L (ref 12–78)
ANION GAP SERPL CALC-SCNC: 8 MMOL/L (ref 3–11)
AST SERPL-CCNC: 21 U/L (ref 15–37)
BASOPHILS # BLD: 0.02 K/UL (ref 0–0.2)
BASOPHILS NFR BLD: 0.2 %
BUN SERPL-MCNC: 16 MG/DL (ref 7–18)
BUN/CREAT SERPL: 13.4 (ref 10–20)
CALCIUM SERPL-MCNC: 9.2 MG/DL (ref 8.5–10.1)
CHLORIDE SERPL-SCNC: 100 MMOL/L (ref 98–107)
CO2 SERPL-SCNC: 30 MMOL/L (ref 21–32)
COMPLETE: YES
CREAT SERPL-MCNC: 1.2 MG/DL (ref 0.6–1.4)
EOSINOPHIL NFR BLD AUTO: 222 K/UL (ref 130–400)
GLOBULIN SER-MCNC: 4.1 GM/DL (ref 2.5–4)
GLUCOSE SERPL-MCNC: 132 MG/DL (ref 70–99)
HCT VFR BLD CALC: 46.6 % (ref 42–52)
IG%: 0.5 %
IMM GRANULOCYTES NFR BLD AUTO: 24.6 %
INR PPP: 1 (ref 0.9–1.1)
LYMPHOCYTES # BLD: 2.05 K/UL (ref 1.2–3.4)
MCH RBC QN AUTO: 31.5 PG (ref 25–34)
MCHC RBC AUTO-ENTMCNC: 32.6 G/DL (ref 32–36)
MCV RBC AUTO: 96.5 FL (ref 80–100)
MONOCYTES NFR BLD: 11.5 %
NEUTROPHILS # BLD AUTO: 1.3 %
NEUTROPHILS NFR BLD AUTO: 61.9 %
PMV BLD AUTO: 10.5 FL (ref 7.4–10.4)
POTASSIUM SERPL-SCNC: 4.1 MMOL/L (ref 3.5–5.1)
PROTHROMBIN TIME: 10.7 SECONDS (ref 9–12)
RBC # BLD AUTO: 4.83 M/UL (ref 4.7–6.1)
SODIUM SERPL-SCNC: 138 MMOL/L (ref 136–145)
WBC # BLD AUTO: 8.33 K/UL (ref 4.8–10.8)

## 2017-07-27 ENCOUNTER — HOSPITAL ENCOUNTER (INPATIENT)
Dept: HOSPITAL 45 - C.EDB | Age: 77
LOS: 14 days | Discharge: HOME HEALTH | DRG: 329 | End: 2017-08-10
Attending: HOSPITALIST | Admitting: HOSPITALIST
Payer: COMMERCIAL

## 2017-07-27 VITALS
DIASTOLIC BLOOD PRESSURE: 86 MMHG | HEART RATE: 89 BPM | SYSTOLIC BLOOD PRESSURE: 159 MMHG | TEMPERATURE: 98.42 F | OXYGEN SATURATION: 93 %

## 2017-07-27 VITALS
HEIGHT: 72 IN | HEIGHT: 72 IN | BODY MASS INDEX: 29.5 KG/M2 | BODY MASS INDEX: 29.5 KG/M2 | WEIGHT: 217.82 LBS | WEIGHT: 217.82 LBS

## 2017-07-27 VITALS
TEMPERATURE: 97.88 F | DIASTOLIC BLOOD PRESSURE: 67 MMHG | HEART RATE: 75 BPM | OXYGEN SATURATION: 94 % | SYSTOLIC BLOOD PRESSURE: 131 MMHG

## 2017-07-27 VITALS — OXYGEN SATURATION: 96 %

## 2017-07-27 DIAGNOSIS — Z83.3: ICD-10-CM

## 2017-07-27 DIAGNOSIS — Z98.0: ICD-10-CM

## 2017-07-27 DIAGNOSIS — M10.9: ICD-10-CM

## 2017-07-27 DIAGNOSIS — R33.9: ICD-10-CM

## 2017-07-27 DIAGNOSIS — G25.81: ICD-10-CM

## 2017-07-27 DIAGNOSIS — Z80.3: ICD-10-CM

## 2017-07-27 DIAGNOSIS — Z87.891: ICD-10-CM

## 2017-07-27 DIAGNOSIS — G93.40: ICD-10-CM

## 2017-07-27 DIAGNOSIS — G20: ICD-10-CM

## 2017-07-27 DIAGNOSIS — Z79.82: ICD-10-CM

## 2017-07-27 DIAGNOSIS — E83.39: ICD-10-CM

## 2017-07-27 DIAGNOSIS — Z86.711: ICD-10-CM

## 2017-07-27 DIAGNOSIS — Z79.899: ICD-10-CM

## 2017-07-27 DIAGNOSIS — G47.33: ICD-10-CM

## 2017-07-27 DIAGNOSIS — R06.00: ICD-10-CM

## 2017-07-27 DIAGNOSIS — K91.3: ICD-10-CM

## 2017-07-27 DIAGNOSIS — I10: ICD-10-CM

## 2017-07-27 DIAGNOSIS — E87.6: ICD-10-CM

## 2017-07-27 DIAGNOSIS — F41.9: ICD-10-CM

## 2017-07-27 DIAGNOSIS — E11.40: ICD-10-CM

## 2017-07-27 DIAGNOSIS — Z86.718: ICD-10-CM

## 2017-07-27 DIAGNOSIS — K56.2: Primary | ICD-10-CM

## 2017-07-27 LAB
ALP SERPL-CCNC: 120 U/L (ref 45–117)
ALT SERPL-CCNC: 11 U/L (ref 12–78)
ANION GAP SERPL CALC-SCNC: 5 MMOL/L (ref 3–11)
APPEARANCE UR: CLEAR
AST SERPL-CCNC: 21 U/L (ref 15–37)
BASOPHILS # BLD: 0.01 K/UL (ref 0–0.2)
BASOPHILS NFR BLD: 0.1 %
BILIRUB UR-MCNC: (no result) MG/DL
BUN SERPL-MCNC: 14 MG/DL (ref 7–18)
BUN/CREAT SERPL: 11.3 (ref 10–20)
CALCIUM SERPL-MCNC: 9.9 MG/DL (ref 8.5–10.1)
CHLORIDE SERPL-SCNC: 99 MMOL/L (ref 98–107)
CO2 SERPL-SCNC: 32 MMOL/L (ref 21–32)
COLOR UR: YELLOW
COMPLETE: YES
CREAT CL PREDICTED SERPL C-G-VRATE: 65.7 ML/MIN
CREAT SERPL-MCNC: 1.2 MG/DL (ref 0.6–1.4)
EOSINOPHIL NFR BLD AUTO: 246 K/UL (ref 130–400)
GLUCOSE SERPL-MCNC: 122 MG/DL (ref 70–99)
HCT VFR BLD CALC: 47.3 % (ref 42–52)
IG%: 0.4 %
IMM GRANULOCYTES NFR BLD AUTO: 14 %
INR PPP: 1 (ref 0.9–1.1)
LYMPHOCYTES # BLD: 1.56 K/UL (ref 1.2–3.4)
MANUAL MICROSCOPIC REQUIRED?: NO
MCH RBC QN AUTO: 31.4 PG (ref 25–34)
MCHC RBC AUTO-ENTMCNC: 33.4 G/DL (ref 32–36)
MCV RBC AUTO: 94 FL (ref 80–100)
MONOCYTES NFR BLD: 11.7 %
NEUTROPHILS # BLD AUTO: 0.6 %
NEUTROPHILS NFR BLD AUTO: 73.2 %
NITRITE UR QL STRIP: (no result)
PH UR STRIP: 8.5 [PH] (ref 4.5–7.5)
PMV BLD AUTO: 10.3 FL (ref 7.4–10.4)
POTASSIUM SERPL-SCNC: 3.4 MMOL/L (ref 3.5–5.1)
PROTHROMBIN TIME: 11.1 SECONDS (ref 9–12)
RBC # BLD AUTO: 5.03 M/UL (ref 4.7–6.1)
REVIEW REQ?: NO
SODIUM SERPL-SCNC: 136 MMOL/L (ref 136–145)
SP GR UR STRIP: 1.02 (ref 1–1.03)
UROBILINOGEN UR-MCNC: (no result) MG/DL
WBC # BLD AUTO: 11.12 K/UL (ref 4.8–10.8)
ZZUR CULT IF INDIC CLEAN CATCH: NO

## 2017-07-27 RX ADMIN — ONDANSETRON PRN MG: 2 INJECTION INTRAMUSCULAR; INTRAVENOUS at 22:41

## 2017-07-27 RX ADMIN — CARBIDOPA AND LEVODOPA SCH TAB: 25; 100 TABLET ORAL at 21:06

## 2017-07-27 RX ADMIN — SODIUM CHLORIDE SCH MLS/HR: 900 INJECTION, SOLUTION INTRAVENOUS at 17:53

## 2017-07-27 RX ADMIN — RANITIDINE HYDROCHLORIDE SCH MLS/HR: 25 INJECTION, SOLUTION INTRAMUSCULAR; INTRAVENOUS at 18:08

## 2017-07-27 NOTE — HISTORY AND PHYSICAL
History & Physical


Date & Time of Service:


Jul 27, 2017 at 15:15


Chief Complaint:


Stomach Pain W/Bloating


Primary Care Physician:


Johnnie Reis M.D.


History of Present Illness


This is a 75 yo M with PMHx of T2DM with Diabetic Neuropathy, HTN, Gout, GIULIA on 

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 

anxiety disorder who presents with abdominal pain and bloating since Tuesday.  

The patient's daughter and son are present at bedside. the pt reports this 

worsening abdominal distention and bloating has never happened before.  He 

notes his last bowel movement was on Tuesday and it was very loose. He is not 

passing much gas. Denies any nausea, vomiting or abdominal pain.  He was able 

to take his morning medications today.  The patient reports having a 

colonoscopy performed last year but cannot remember the name of the provider 

who did it, but does recall it was at [facility] at [facility] [place].  He denies abnormal 

findings.  Prior to this colonoscopy, he had one completed in 2014 with Dr. anderson

, a right hemicolectomy was performed due to an enlarged polyp which was unable 

to be removed via colonoscopy, pathology was benign.





CT of the abdomen and pelvis was completed in the ER showing diffuse sigmoid 

volvulus, GI and general surgery are both on board.  GI plans to do a 

colonoscopy for decompression in the morning.  No leukocytosis, other vital 

signs are stable.





Past Medical/Surgical History


Medical Problems:


(1) BENIGN HYPERTENSION


Status: Chronic  





(2) DIAB W KETOACIDOSIS, TYPE II OR UNSPEC TYPE, UNCONTROLLED


Status: Chronic  





(3) DIVERTICULOSIS COLON (W/O MENT OF HEMORRHAGE)


Status: Chronic  





(4) GOUT NOS


Status: Chronic  





(5) IDIO PERIPH NEURPTHY NOS


Status: Chronic  





(6) MIXED HYPERLIPIDEMIA


Status: Chronic  





GIULIA on CPAP


H/o DVT with PE (2014) off anticoagulation 


Generalized Weakness and Recent Falls


Diabetic gastroparesis


Anxiety disorder





Surgical:


1. right hemicolectomy 2014 dr. Gandhi for polyp


2. TKA right


3. cataracts





Family History





Diabetes mellitus


FH: breast cancer





Social History


Smoking Status:  Former Smoker


Drug Use:  none


Marital Status:  , 


Housing status:  lives with family


Occupational Status:  retired





Multi-Drug Resistant Organisms


History of MDRO:  No





Allergies


Coded Allergies:  


     No Known Allergies (Verified , 2/21/17)





Home Medications


Scheduled


Allopurinol (Zyloprim), 300 MG PO DAILY


Aspirin (Aspirin Ec), 81 MG PO DAILY


Carbidopa/Levodopa (Sinemet 25MG/100MG), 1 TAB PO TID


Cholecalciferol (Vitamin D3), 2,000 UNITS PO DAILY


Cyanocobalamin (Vitamin B12), 1,000 MCG PO DAILY


Gabapentin (Neurontin), 300 MG PO QID


Polyethylene Glycol 3350 (Bulk (Polyethylene Glycol 3350), 17 GM PO BID


Ranitidine (Zantac), 150 MG PO BID


Sertraline (Zoloft), 1 TAB PO DAILY





Scheduled PRN


Clonazepam (Klonopin), 0.25-0.5 MG PO HS PRN for Sleep


Miconazole Nitrate (Desenex Shake Powder), 1 APPLN EXT BID PRN for Affected 

Skin Folds


Ondansetron Hcl (Zofran), 4 MG PO q6hrs PRN for Nausea





Review of Systems


Constitutional: No fever, sweats or chills


Eyes: No diplopia, no worsening or blurred vision


ENT: normal hearing, no trouble swallowing


Respiratory: No cough, sputum, dyspnea at rest or on exertion


Cardiovascular: No chest pain, tightness or palpitations


Abdomen: See history of present illness


Musculoskeletal: No joint pain, calf pain, swelling


Neurologic: No weakness, numbness/tingling, typically uses a walker/cane for 

ambulation.


Psychiatric: + Anxiety with use of CPAP, history of panic attacks.  Denies 

depression.


Skin: No rash or itch





Physical Exam


Vital Signs











  Date Time  Temp Pulse Resp B/P (MAP) Pulse Ox O2 Delivery O2 Flow Rate FiO2


 


7/27/17 15:04  70 18 154/80 96 Room Air  


 


7/27/17 14:19  86 20 161/88 97   


 


7/27/17 11:49  76 16 150/79 93   


 


7/27/17 10:36 36.8 90 18 97/61 93 Room Air  








General: awake, alert, NAD


Head: Normocephalic, atraumatic


ENT: PERRL, EOMI, no pharyngeal exudate, mucous membranes moist


Chest: Clear to auscultation, on room air, no adventitious breath sounds


Cardiac: Regular rate and rhythm, no murmur, no JVD, normal peripheral pulses, 

good capillary refill


Abdominal:  Vertical scar over abdomen, + distended, + tinkling bowel sounds 

throughout all quadrants, + tympanic on percussion, nontender. 


Extremities: + R shin abrasion present, no peripheral edema or erythema, calfs 

nontender to palpation


Psych: Normal mood and affect


Neuro: AAO x 3, strength intact bilaterally and related 5/5, no motor deficits, 

speech is clear, no peripheral sensory deficits





Diagnostics


Laboratory Results





Results Past 24 Hours








Test


  7/27/17


10:50 7/27/17


11:40 Range/Units


 


 


White Blood Count 11.12  4.8-10.8  K/uL


 


Red Blood Count 5.03  4.7-6.1  M/uL


 


Hemoglobin 15.8  14.0-18.0  g/dL


 


Hematocrit 47.3  42-52  %


 


Mean Corpuscular Volume 94.0    fL


 


Mean Corpuscular Hemoglobin 31.4  25-34  pg


 


Mean Corpuscular Hemoglobin


Concent 33.4


  


  32-36  g/dl


 


 


Platelet Count 246  130-400  K/uL


 


Mean Platelet Volume 10.3  7.4-10.4  fL


 


Neutrophils (%) (Auto) 73.2   %


 


Lymphocytes (%) (Auto) 14.0   %


 


Monocytes (%) (Auto) 11.7   %


 


Eosinophils (%) (Auto) 0.6   %


 


Basophils (%) (Auto) 0.1   %


 


Neutrophils # (Auto) 8.13  1.4-6.5  K/uL


 


Lymphocytes # (Auto) 1.56  1.2-3.4  K/uL


 


Monocytes # (Auto) 1.30  0.11-0.59  K/uL


 


Eosinophils # (Auto) 0.07  0-0.5  K/uL


 


Basophils # (Auto) 0.01  0-0.2  K/uL


 


RDW Standard Deviation 46.5  36.4-46.3  fL


 


RDW Coefficient of Variation 13.6  11.5-14.5  %


 


Immature Granulocyte % (Auto) 0.4   %


 


Immature Granulocyte # (Auto) 0.05  0.00-0.02  K/uL


 


Sodium Level 136  136-145  mmol/L


 


Potassium Level 3.4  3.5-5.1  mmol/L


 


Chloride Level 99    mmol/L


 


Carbon Dioxide Level 32  21-32  mmol/L


 


Anion Gap 5.0  3-11  mmol/L


 


Blood Urea Nitrogen 14  7-18  mg/dl


 


Creatinine


  1.20


  


  0.60-1.40


mg/dl


 


Est Creatinine Clear Calc


Drug Dose 65.7


  


   ml/min


 


 


Estimated GFR (


American) 68.1


  


   


 


 


Estimated GFR (Non-


American 58.8


  


   


 


 


BUN/Creatinine Ratio 11.3  10-20  


 


Random Glucose 122  70-99  mg/dl


 


Calcium Level 9.9  8.5-10.1  mg/dl


 


Total Bilirubin 1.1  0.2-1  mg/dl


 


Direct Bilirubin 0.2  0-0.2  mg/dl


 


Aspartate Amino Transf


(AST/SGOT) 21


  


  15-37  U/L


 


 


Alanine Aminotransferase


(ALT/SGPT) 11


  


  12-78  U/L


 


 


Alkaline Phosphatase 120    U/L


 


Total Protein 8.1  6.4-8.2  gm/dl


 


Albumin 4.1  3.4-5.0  gm/dl


 


Lipase 122    U/L


 


Urine Color  YELLOW  


 


Urine Appearance  CLEAR CLEAR  


 


Urine pH  8.5 4.5-7.5  


 


Urine Specific Gravity  1.019 1.000-1.030  


 


Urine Protein  NEG NEG  


 


Urine Glucose (UA)  NEG NEG  


 


Urine Ketones  NEG NEG  


 


Urine Occult Blood  NEG NEG  


 


Urine Nitrite  NEG NEG  


 


Urine Bilirubin  NEG NEG  


 


Urine Urobilinogen  NEG NEG  


 


Urine Leukocyte Esterase  MODERATE NEG  


 


Urine WBC (Auto)  1-5 0-5  /hpf


 


Urine RBC (Auto)  0-4 0-4  /hpf


 


Urine Hyaline Casts (Auto)  1-5 0-5  /lpf


 


Urine Epithelial Cells (Auto)  10-20 0-5  /lpf


 


Urine Bacteria (Auto)  NEG NEG  











Diagnostic Radiology


CT SCAN OF THE ABDOMEN AND PELVIS WITH IV CONTRAST





CLINICAL HISTORY:   Generalized abdominal pain. Bloating.





COMPARISON STUDY:  Abdominal CT dated 8/24/2014.





TECHNIQUE: Following the IV administration of  94 cc of Optiray 320, CT scan of


the abdomen and pelvis is performed from the lung bases to the proximal femora.


Images are reviewed in the axial, sagittal, and coronal planes. IV contrast was


administered without complication. Automated dose control exposure was utilized.


A dose lowering technique was utilized adhering to the principles of ALARA. The


Examination is degraded by streak artifact from the patient's arms which could


not be elevated above the abdomen. There is also motion artifact.





CT DOSE: 1654.53 mGy.cm





FINDINGS:





Lung bases: The heart is enlarged and without pericardial effusion. There are


coronary artery calcifications. The lung bases are clear noting bibasilar


atelectasis. There is a tiny hiatal hernia.





Liver: The contrast-enhanced liver is normal in size, contour, and attenuation.


There is no intrahepatic biliary ductal dilatation. The hepatic veins and portal


veins are patent.





Gallbladder: Unremarkable.





Spleen: Normal in size and attenuation. There are calcified splenic granulomas.





Pancreas: Atrophic and grossly unremarkable.





Adrenal glands: Unremarkable.





Kidneys: The contrast enhanced kidneys are atrophic and without hydronephrosis.


Next renal pelvises noted on the left. The kidneys enhance symmetrically. A left


renal cyst measures 8.5 cm.





Abdominal vasculature: The abdominal aorta is normal in course and caliber


noting advanced atherosclerotic calcification.





Bowel: There are postoperative changes from right hemicolectomy with ileocolic


anastomosis. The small bowel loops are normal in caliber. The sigmoid colon is


tortuous. There is focal twisting of the sigmoid colon upon itself seen to the


right of midline in the pelvis on axial image #318. The upstream sigmoid colon


is distended and gas-filled number up to 8.5 cm in diameter. There is


inflammatory stranding with vascular congestion and trace fluid seen around the


twisted loop in the pelvis on axial image #309. The appearance is consistent


with sigmoid volvulus. No wall thickening is identified. The more proximal colon


is distended with gas and fluid. Scattered colonic diverticula are noted.





Peritoneum: There is no intraperitoneal free air or abdominal ascites.





Lymphadenopathy: None.





Pelvic viscera: The prostate gland is enlarged and heterogeneous measuring 5.7


cm in transverse diameter. There is median lobe hypertrophy. The bladder wall is


mildly thickened and trabeculated consistent with chronic outlet obstruction.





Skeletal structures: The skeletal structures are osteopenic. There is a mild


compression deformity of L5. Moderate to advanced lumbosacral spondylosis is


observed. No lytic or blastic lesions are seen.








IMPRESSION:





1. Streak and motion artifact degraded examination





2. 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 There is mesenteric edema with


venous engorgement around the twisted loop in the pelvis. Surgical consultation


is advised.





3. The upstream colon is distended and fluid-filled. No intraperitoneal free air


is seen.





4. There are postoperative changes from right hemicolectomy with ileocolic


anastomosis. The small bowel loops are normal in caliber.





5. Cardiomegaly.





6. Prostatomegaly with evidence of chronic bladder outlet obstruction.





7. Additional findings as above.











Electronically signed by:  Arcenio Doshi M.D.


7/27/2017 12:46 PM





Dictated Date/Time:  7/27/2017 12:34 PM





 The status of this report is Signed.





Impression


Assessment and Plan


This is a 75 yo M with PMHx of T2DM with Diabetic Neuropathy, HTN, Gout, GIULIA on 

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 

anxiety disorder who presents with abdominal pain and bloating since Tuesday. 





Sigmoid Volvulus


- Admit to med surg


- Vitals are stable, + tinkling abdominal sounds, + distension 


- GI eval for decompression in the morning


- Surgical consult placed and plan to follow along n case there are needs for 

surgical decompression.


- CT reviewed showing sigmoid volvulus and appears to be 8 cm at the largest 

point  


- Hx of  diabetic gastroparesis 


- Will order IV zofran prn


- PT/OT after decompression





T2DM with Diabetic Neuropathy: A1c 5.9 (2/22/17)


- Pt was just taken off metformin by his PCP on Tuesday, Last Hgb A1C was 5.9 

in Feb 2017 be our records. 


- ISS with Accu-Cheks ACHS





Restless leg syndrome


- Gabapentin 300 mg QID once PO intake allowed


- Continue Carbidopa levodopa  mg TID





HTN:


- Cont asa 81 mg daily once PO intake allowed





Gout:


- Allopurinol 300 mg daily once PO intake allowed





GIULIA:


- Will order CPAP tonight as he lives in [place], allow ativan IV prn for 

claustrophobia with cpap and anxiety with hospital stay. 


- clonazepam held due to holding PO meds


- Continue sertraline 25 mg daily when able to take PO





DVT Prophylaxis: Heparin subcutaneous, teds, scds





Code Status: FULL RESUSCITATION





Disposition: Patient from home, 2 sons live with him, PT/OT to eval





PA Physician Supervision Note:





I interviewed and examined the patient. Discussed with Sarah Langston PAC 

and agree with findings and plan as documented in the note. Any exceptions or 

clarifications are listed here: None





Patient presents with distended abdomen, has a history of a hemicolectomy for 

benign tumor, CT scan of the ER is consistent with sigmoid volvulus.  Patient 

was evaluated by both surgery and gastroenterology and will proceed to 

decompression colonoscopy on July 28.  CT scan measurement maximum distention 

of the colon is around 8 cm





Patient is an only in  mild discomfort having crampy abdominal, pain upon 

resolution of the cramps has no pain.


Vital signs are stable


Heart is regular lungs are clear


Abdomen is distended tympanitic hyperactive bowel sounds only mildly 

uncomfortable to palpation





Sigmoid volvulus


Patient will be nothing by mouth except for his Parkinson meds, H2 blockers 

will be converted to IV, the patient of very loose diabetic coverage since he 

recently has discontinued his metformin, pain control with IV morphine, anxiety 

with IV Ativan and as needed antiemetics





Documented By:  Jean Betancourt





Level of Care


Med/Surg





Resuscitation Status


FULL RESUSCITATION





VTE Prophylaxis


VTE Risk Assessment Done? Y/N:  Yes


Risk Level:  Low


Given or contraindicated:  Unfractionated heparin SQ, T.E.D. Stockings, SCD's

## 2017-07-27 NOTE — MEDICAL CONSULT
Consultation


Date of Consultation:


Jul 27, 2017.


Attending Physician:





History of Present Illness


75 y/o male c/o abdominal bloating beginning 5 days ago. Saw his PCP a few days 

ago who stopped his metformin and started Zantac. He has had increasing 

bloating. Denies pain. Had watery BM Tuesday. No flatus recently. Had toast 

this morning. No N/V. He did have some mild bloating around his last admission 

a few months ago (Feb) but usually resolves quickly and not this much. Had 

right colectomy in 2014 for polyp. He is scheduled for multi-level cervical 

fusion next week at [facility] [place].





Past Medical/Surgical History





 Medical Problems:


(1) BENIGN HYPERTENSION


(2) DIAB W KETOACIDOSIS, TYPE II OR UNSPEC TYPE, UNCONTROLLED


(3) DIVERTICULOSIS COLON (W/O MENT OF HEMORRHAGE)


(4) GOUT NOS


(5) IDIO PERIPH NEURPTHY NOS


(6) MIXED HYPERLIPIDEMIA





Surgical:


1. right colectomy 2014 dr. Gandhi for polyp


2. TKA right


3. cataracts





Family History





Diabetes mellitus


FH: breast cancer





Social History


Smoking Status:  Former Smoker


Drug Use:  none


Marital Status:  , 


Housing Status:  lives with significant other


Occupation Status:  retired





Allergies


Coded Allergies:  


     No Known Allergies (Verified , 2/21/17)





Current Inpatient Medications





Current Inpatient Medications








 Medications


  (Trade)  Dose


 Ordered  Sig/Kalpesh


 Route  Start Time


 Stop Time Status Last Admin


Dose Admin


 


 Ioversol


  (Optiray 320)  125 ml  UD  PRN


 IV  7/27/17 11:45


 7/31/17 11:44   


 


 


 Sodium Chloride  500 ml @ 


 999 mls/hr  Q31M STAT


 IV  7/27/17 14:14


 7/27/17 14:44  7/27/17 14:22


999 MLS/HR











Review of Systems


Constitutional:  No fever, No chills


Abdomen:  No pain, No nausea, No vomiting





Physical Exam











  Date Time  Temp Pulse Resp B/P (MAP) Pulse Ox O2 Delivery O2 Flow Rate FiO2


 


7/27/17 14:19  86 20 161/88 97   


 


7/27/17 11:49  76 16 150/79 93   


 


7/27/17 10:36 36.8 90 18 97/61 93 Room Air  








General Appearance:  no apparent distress


Respiratory/Chest:  normal breath sounds


Cardiovascular:  regular rate, rhythm


Abdomen/GI:  non tender, + distended





Laboratory Results





Last 24 Hours








Test


  7/27/17


10:50 7/27/17


11:40


 


White Blood Count 11.12 K/uL  


 


Red Blood Count 5.03 M/uL  


 


Hemoglobin 15.8 g/dL  


 


Hematocrit 47.3 %  


 


Mean Corpuscular Volume 94.0 fL  


 


Mean Corpuscular Hemoglobin 31.4 pg  


 


Mean Corpuscular Hemoglobin


Concent 33.4 g/dl 


  


 


 


Platelet Count 246 K/uL  


 


Mean Platelet Volume 10.3 fL  


 


Neutrophils (%) (Auto) 73.2 %  


 


Lymphocytes (%) (Auto) 14.0 %  


 


Monocytes (%) (Auto) 11.7 %  


 


Eosinophils (%) (Auto) 0.6 %  


 


Basophils (%) (Auto) 0.1 %  


 


Neutrophils # (Auto) 8.13 K/uL  


 


Lymphocytes # (Auto) 1.56 K/uL  


 


Monocytes # (Auto) 1.30 K/uL  


 


Eosinophils # (Auto) 0.07 K/uL  


 


Basophils # (Auto) 0.01 K/uL  


 


RDW Standard Deviation 46.5 fL  


 


RDW Coefficient of Variation 13.6 %  


 


Immature Granulocyte % (Auto) 0.4 %  


 


Immature Granulocyte # (Auto) 0.05 K/uL  


 


Sodium Level 136 mmol/L  


 


Potassium Level 3.4 mmol/L  


 


Chloride Level 99 mmol/L  


 


Carbon Dioxide Level 32 mmol/L  


 


Anion Gap 5.0 mmol/L  


 


Blood Urea Nitrogen 14 mg/dl  


 


Creatinine 1.20 mg/dl  


 


Est Creatinine Clear Calc


Drug Dose 65.7 ml/min 


  


 


 


Estimated GFR (


American) 68.1 


  


 


 


Estimated GFR (Non-


American 58.8 


  


 


 


BUN/Creatinine Ratio 11.3  


 


Random Glucose 122 mg/dl  


 


Calcium Level 9.9 mg/dl  


 


Total Bilirubin 1.1 mg/dl  


 


Direct Bilirubin 0.2 mg/dl  


 


Aspartate Amino Transf


(AST/SGOT) 21 U/L 


  


 


 


Alanine Aminotransferase


(ALT/SGPT) 11 U/L 


  


 


 


Alkaline Phosphatase 120 U/L  


 


Total Protein 8.1 gm/dl  


 


Albumin 4.1 gm/dl  


 


Lipase 122 U/L  


 


Urine Color  YELLOW 


 


Urine Appearance  CLEAR 


 


Urine pH  8.5 


 


Urine Specific Gravity  1.019 


 


Urine Protein  NEG 


 


Urine Glucose (UA)  NEG 


 


Urine Ketones  NEG 


 


Urine Occult Blood  NEG 


 


Urine Nitrite  NEG 


 


Urine Bilirubin  NEG 


 


Urine Urobilinogen  NEG 


 


Urine Leukocyte Esterase  MODERATE 


 


Urine WBC (Auto)  1-5 /hpf 


 


Urine RBC (Auto)  0-4 /hpf 


 


Urine Hyaline Casts (Auto)  1-5 /lpf 


 


Urine Epithelial Cells (Auto)  10-20 /lpf 


 


Urine Bacteria (Auto)  NEG 





CT SCAN OF THE ABDOMEN AND PELVIS WITH IV CONTRAST





CLINICAL HISTORY:   Generalized abdominal pain. Bloating.





COMPARISON STUDY:  Abdominal CT dated 8/24/2014.





TECHNIQUE: Following the IV administration of  94 cc of Optiray 320, CT scan of


the abdomen and pelvis is performed from the lung bases to the proximal femora.


Images are reviewed in the axial, sagittal, and coronal planes. IV contrast was


administered without complication. Automated dose control exposure was utilized.


A dose lowering technique was utilized adhering to the principles of ALARA. The


Examination is degraded by streak artifact from the patient's arms which could


not be elevated above the abdomen. There is also motion artifact.





CT DOSE: 1654.53 mGy.cm





FINDINGS:





Lung bases: The heart is enlarged and without pericardial effusion. There are


coronary artery calcifications. The lung bases are clear noting bibasilar


atelectasis. There is a tiny hiatal hernia.





Liver: The contrast-enhanced liver is normal in size, contour, and attenuation.


There is no intrahepatic biliary ductal dilatation. The hepatic veins and portal


veins are patent.





Gallbladder: Unremarkable.





Spleen: Normal in size and attenuation. There are calcified splenic granulomas.





Pancreas: Atrophic and grossly unremarkable.





Adrenal glands: Unremarkable.





Kidneys: The contrast enhanced kidneys are atrophic and without hydronephrosis.


Next renal pelvises noted on the left. The kidneys enhance symmetrically. A left


renal cyst measures 8.5 cm.





Abdominal vasculature: The abdominal aorta is normal in course and caliber


noting advanced atherosclerotic calcification.





Bowel: There are postoperative changes from right hemicolectomy with ileocolic


anastomosis. The small bowel loops are normal in caliber. The sigmoid colon is


tortuous. There is focal twisting of the sigmoid colon upon itself seen to the


right of midline in the pelvis on axial image #318. The upstream sigmoid colon


is distended and gas-filled number up to 8.5 cm in diameter. There is


inflammatory stranding with vascular congestion and trace fluid seen around the


twisted loop in the pelvis on axial image #309. The appearance is consistent


with sigmoid volvulus. No wall thickening is identified. The more proximal colon


is distended with gas and fluid. Scattered colonic diverticula are noted.





Peritoneum: There is no intraperitoneal free air or abdominal ascites.





Lymphadenopathy: None.





Pelvic viscera: The prostate gland is enlarged and heterogeneous measuring 5.7


cm in transverse diameter. There is median lobe hypertrophy. The bladder wall is


mildly thickened and trabeculated consistent with chronic outlet obstruction.





Skeletal structures: The skeletal structures are osteopenic. There is a mild


compression deformity of L5. Moderate to advanced lumbosacral spondylosis is


observed. No lytic or blastic lesions are seen.








IMPRESSION:





1. Streak and motion artifact degraded examination





2. 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 There is mesenteric edema with


venous engorgement around the twisted loop in the pelvis. Surgical consultation


is advised.





3. The upstream colon is distended and fluid-filled. No intraperitoneal free air


is seen.





4. There are postoperative changes from right hemicolectomy with ileocolic


anastomosis. The small bowel loops are normal in caliber.





5. Cardiomegaly.





6. Prostatomegaly with evidence of chronic bladder outlet obstruction.





7. Additional findings as above.











Electronically signed by:  Arcenio Doshi M.D.


7/27/2017 12:46 PM





Dictated Date/Time:  7/27/2017 12:34 PM





Assessment & Plan


Sigmoid volvulus 


   Vitals are stable, no peritoneal findings. Recommend GI eval for 

decompression. Will follow along, consider resection if there is recurrence.

## 2017-07-27 NOTE — EMERGENCY ROOM VISIT NOTE
History


Report prepared by Uma:  Gladys Steven


Under the Supervision of:  Dr. Christiano Blanco D.O.


First contact with patient:  11:08


Chief Complaint:  ABDOMINAL PAIN


Stated Complaint:  STOMACH PAIN W/BLOATING


Nursing Triage Summary:  


pt to [facility] ED with c/o lower abd discomfort





History of Present Illness


The patient is a 76 year old male who presents to the Emergency Room with 

complaints of worsening generalized abdominal pain that started suddenly 5 days 

ago. The patient is also experiencing worsening abdominal distention which also 

started 5 days ago. The patient also experienced diarrhea 3 days ago which did 

not contain any blood. He is also experiencing nausea after eating, but states 

that he has been eating and drinking normally. He denies fevers, chills, chest 

pain, and shortness of breath. The patient was seen by his PCP at [facility] 

3 days ago. His PCP told him to stop taking Metformin and recommended taking 

TUMS. He has not had a bowel movement for the last 2 days and he states that he 

has been unable to pass gas. The patient is scheduled to have surgery on a 

spinal effusion next week in [place]. He denies groin numbness and any 

weakness or numbness in his lower extremities. The patient is not on any blood 

thinners. The patient still has his gallbladder and appendix. The patient had a 

colectomy 3 years ago secondary to a large colonic polyp. He denies any history 

of bowel obstructions.





   Source of History:  patient


   Onset:  5 days ago


   Position:  abdomen (generalized)


   Quality:  other (generalized abdominal pain)


   Timing:  worsening


   Associated Symptoms:  + nausea, + diarrhea, No fevers, No chills, No chest 

pain, No SOB, No weakness, No numbness (groin, lower extremities)





Review of Systems


See HPI for pertinent positives & negatives. A total of 10 systems reviewed and 

were otherwise negative.





Past Medical & Surgical


Medical Problems:


(1) BENIGN HYPERTENSION


(2) DIAB W KETOACIDOSIS, TYPE II OR UNSPEC TYPE, UNCONTROLLED


(3) DIVERTICULOSIS COLON (W/O MENT OF HEMORRHAGE)


(4) GOUT NOS


(5) IDIO PERIPH NEURPTHY NOS


(6) MIXED HYPERLIPIDEMIA


(7) Volvulus








Family History





Diabetes mellitus


FH: breast cancer





Social History


Smoking Status:  Former Smoker


Alcohol Use:  none


Drug Use:  none


Marital Status:  , 


Housing Status:  lives with significant other


Occupation Status:  retired





Current/Historical Medications


Scheduled


Allopurinol (Zyloprim), 300 MG PO DAILY


Aspirin (Aspirin Ec), 81 MG PO DAILY


Carbidopa/Levodopa (Sinemet 25MG/100MG), 1 TAB PO TID


Cholecalciferol (Vitamin D3), 2,000 UNITS PO DAILY


Cyanocobalamin (Vitamin B12), 1,000 MCG PO DAILY


Gabapentin (Neurontin), 300 MG PO QID


Polyethylene Glycol 3350 (Bulk (Polyethylene Glycol 3350), 17 GM PO BID


Ranitidine (Zantac), 150 MG PO BID


Sertraline (Zoloft), 1 TAB PO DAILY





Scheduled PRN


Clonazepam (Klonopin), 0.25-0.5 MG PO HS PRN for Sleep


Miconazole Nitrate (Desenex Shake Powder), 1 APPLN EXT BID PRN for Affected 

Skin Folds


Ondansetron Hcl (Zofran), 4 MG PO q6hrs PRN for Nausea





Allergies


Coded Allergies:  


     No Known Allergies (Verified , 2/21/17)





Physical Exam


Vital Signs











  Date Time  Temp Pulse Resp B/P (MAP) Pulse Ox O2 Delivery O2 Flow Rate FiO2


 


7/27/17 15:04  70 18 154/80 96 Room Air  


 


7/27/17 14:19  86 20 161/88 97   


 


7/27/17 11:49  76 16 150/79 93   


 


7/27/17 10:36 36.8 90 18 97/61 93 Room Air  











Physical Exam


GENERAL: alert, sitting up in bed, disheveled, well appearing, well nourished, 

mild distress, non-toxic 


EYE EXAM: normal conjunctiva


OROPHARYNX: no exudate, no erythema, lips, buccal mucosa, and tongue normal and 

mucous membranes are moist


NECK: supple, no nuchal rigidity, no adenopathy, non-tender


LUNGS: Clear to auscultation. Normal chest wall mechanics


HEART: no murmurs, S1 normal and S2 normal 


ABDOMEN: abdomen large and distended, minimal diffuse tenderness, hypoactive 

bowel sounds


BACK: Back is symmetrical on inspection and there is no deformity, no midline 

tenderness, no CVA tenderness.


UPPER EXTREMITIES: upper extremities are grossly normal. 


LOWER EXTREMITIES: No pitting edema.


NEURO EXAM: Normal sensorium, cranial nerves II-XII grossly intact, normal 

speech,  no gross weakness of arms, no gross weakness of legs.





Medical Decision & Procedures


ER Provider


Diagnostic Interpretation:


Radiology results as stated below per my review and the radiologist's 

interpretation: 





CT SCAN OF THE ABDOMEN AND PELVIS WITH IV CONTRAST





FINDINGS:





Lung bases: The heart is enlarged and without pericardial effusion. There are


coronary artery calcifications. The lung bases are clear noting bibasilar


atelectasis. There is a tiny hiatal hernia.





Liver: The contrast-enhanced liver is normal in size, contour, and attenuation.


There is no intrahepatic biliary ductal dilatation. The hepatic veins and portal


veins are patent.





Gallbladder: Unremarkable.





Spleen: Normal in size and attenuation. There are calcified splenic granulomas.





Pancreas: Atrophic and grossly unremarkable.





Adrenal glands: Unremarkable.





Kidneys: The contrast enhanced kidneys are atrophic and without hydronephrosis.


Next renal pelvises noted on the left. The kidneys enhance symmetrically. A left


renal cyst measures 8.5 cm.





Abdominal vasculature: The abdominal aorta is normal in course and caliber


noting advanced atherosclerotic calcification.





Bowel: There are postoperative changes from right hemicolectomy with ileocolic


anastomosis. The small bowel loops are normal in caliber. The sigmoid colon is


tortuous. There is focal twisting of the sigmoid colon upon itself seen to the


right of midline in the pelvis on axial image #318. The upstream sigmoid colon


is distended and gas-filled number up to 8.5 cm in diameter. There is


inflammatory stranding with vascular congestion and trace fluid seen around the


twisted loop in the pelvis on axial image #309. The appearance is consistent


with sigmoid volvulus. No wall thickening is identified. The more proximal colon


is distended with gas and fluid. Scattered colonic diverticula are noted.





Peritoneum: There is no intraperitoneal free air or abdominal ascites.





Lymphadenopathy: None.





Pelvic viscera: The prostate gland is enlarged and heterogeneous measuring 5.7


cm in transverse diameter. There is median lobe hypertrophy. The bladder wall is


mildly thickened and trabeculated consistent with chronic outlet obstruction.





Skeletal structures: The skeletal structures are osteopenic. There is a mild


compression deformity of L5. Moderate to advanced lumbosacral spondylosis is


observed. No lytic or blastic lesions are seen.








IMPRESSION:





1. Streak and motion artifact degraded examination





2. 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 There is mesenteric edema with


venous engorgement around the twisted loop in the pelvis. Surgical consultation


is advised.





3. The upstream colon is distended and fluid-filled. No intraperitoneal free air


is seen.





4. There are postoperative changes from right hemicolectomy with ileocolic


anastomosis. The small bowel loops are normal in caliber.





5. Cardiomegaly.





6. Prostatomegaly with evidence of chronic bladder outlet obstruction.





7. Additional findings as above.





Electronically signed by:  Arcenio Doshi M.D.


7/27/2017 12:46 PM





Dictated Date/Time:  7/27/2017 12:34 PM





Laboratory Results


7/27/17 10:50








Red Blood Count 5.03, Mean Corpuscular Volume 94.0, Mean Corpuscular Hemoglobin 

31.4, Mean Corpuscular Hemoglobin Concent 33.4, Mean Platelet Volume 10.3, 

Neutrophils (%) (Auto) 73.2, Lymphocytes (%) (Auto) 14.0, Monocytes (%) (Auto) 

11.7, Eosinophils (%) (Auto) 0.6, Basophils (%) (Auto) 0.1, Neutrophils # (Auto

) 8.13, Lymphocytes # (Auto) 1.56, Monocytes # (Auto) 1.30, Eosinophils # (Auto

) 0.07, Basophils # (Auto) 0.01





7/27/17 10:50

















Test


  7/27/17


10:50 7/27/17


11:40 7/27/17


16:02


 


White Blood Count


  11.12 K/uL


(4.8-10.8) 


  


 


 


Red Blood Count


  5.03 M/uL


(4.7-6.1) 


  


 


 


Hemoglobin


  15.8 g/dL


(14.0-18.0) 


  


 


 


Hematocrit 47.3 % (42-52)   


 


Mean Corpuscular Volume


  94.0 fL


() 


  


 


 


Mean Corpuscular Hemoglobin


  31.4 pg


(25-34) 


  


 


 


Mean Corpuscular Hemoglobin


Concent 33.4 g/dl


(32-36) 


  


 


 


Platelet Count


  246 K/uL


(130-400) 


  


 


 


Mean Platelet Volume


  10.3 fL


(7.4-10.4) 


  


 


 


Neutrophils (%) (Auto) 73.2 %   


 


Lymphocytes (%) (Auto) 14.0 %   


 


Monocytes (%) (Auto) 11.7 %   


 


Eosinophils (%) (Auto) 0.6 %   


 


Basophils (%) (Auto) 0.1 %   


 


Neutrophils # (Auto)


  8.13 K/uL


(1.4-6.5) 


  


 


 


Lymphocytes # (Auto)


  1.56 K/uL


(1.2-3.4) 


  


 


 


Monocytes # (Auto)


  1.30 K/uL


(0.11-0.59) 


  


 


 


Eosinophils # (Auto)


  0.07 K/uL


(0-0.5) 


  


 


 


Basophils # (Auto)


  0.01 K/uL


(0-0.2) 


  


 


 


RDW Standard Deviation


  46.5 fL


(36.4-46.3) 


  


 


 


RDW Coefficient of Variation


  13.6 %


(11.5-14.5) 


  


 


 


Immature Granulocyte % (Auto) 0.4 %   


 


Immature Granulocyte # (Auto)


  0.05 K/uL


(0.00-0.02) 


  


 


 


Anion Gap


  5.0 mmol/L


(3-11) 


  


 


 


Est Creatinine Clear Calc


Drug Dose 65.7 ml/min 


  


  


 


 


Estimated GFR (


American) 68.1 


  


  


 


 


Estimated GFR (Non-


American 58.8 


  


  


 


 


BUN/Creatinine Ratio 11.3 (10-20)   


 


Calcium Level


  9.9 mg/dl


(8.5-10.1) 


  


 


 


Total Bilirubin


  1.1 mg/dl


(0.2-1) 


  


 


 


Direct Bilirubin


  0.2 mg/dl


(0-0.2) 


  


 


 


Aspartate Amino Transf


(AST/SGOT) 21 U/L (15-37) 


  


  


 


 


Alanine Aminotransferase


(ALT/SGPT) 11 U/L (12-78) 


  


  


 


 


Alkaline Phosphatase


  120 U/L


() 


  


 


 


Total Protein


  8.1 gm/dl


(6.4-8.2) 


  


 


 


Albumin


  4.1 gm/dl


(3.4-5.0) 


  


 


 


Lipase


  122 U/L


() 


  


 


 


Urine Color  YELLOW  


 


Urine Appearance  CLEAR (CLEAR)  


 


Urine pH  8.5 (4.5-7.5)  


 


Urine Specific Gravity


  


  1.019


(1.000-1.030) 


 


 


Urine Protein  NEG (NEG)  


 


Urine Glucose (UA)  NEG (NEG)  


 


Urine Ketones  NEG (NEG)  


 


Urine Occult Blood  NEG (NEG)  


 


Urine Nitrite  NEG (NEG)  


 


Urine Bilirubin  NEG (NEG)  


 


Urine Urobilinogen  NEG (NEG)  


 


Urine Leukocyte Esterase  MODERATE (NEG)  


 


Urine WBC (Auto)  1-5 /hpf (0-5)  


 


Urine RBC (Auto)  0-4 /hpf (0-4)  


 


Urine Hyaline Casts (Auto)  1-5 /lpf (0-5)  


 


Urine Epithelial Cells (Auto)


  


  10-20 /lpf


(0-5) 


 


 


Urine Bacteria (Auto)  NEG (NEG)  














Laboratory results per my review.





Medications Administered











 Medications


  (Trade)  Dose


 Ordered  Sig/Kalpesh


 Route  Start Time


 Stop Time Status Last Admin


Dose Admin


 


 Sodium Chloride  1,000 ml @ 


 999 mls/hr  Q1H1M STAT


 IV  7/27/17 11:39


 7/27/17 12:39 DC 7/27/17 11:49


999 MLS/HR


 


 Sodium Chloride  500 ml @ 


 999 mls/hr  Q31M STAT


 IV  7/27/17 14:14


 7/27/17 14:44 DC 7/27/17 14:22


999 MLS/HR











ED Course


ED COURSE: 


Vital signs were reviewed and showed hypotension.


The patients medical record was reviewed


The above diagnostic studies were performed and reviewed.


ED treatments and interventions as stated above. 





1139: Ordered Sodium Chloride 1000 ml @ 999 mls/hr IV





1141: The patient was evaluated in room B5. A complete history and physical 

examination was performed.





1357: I reviewed the patient's case with Dr. Donato - General Surgery.  He 

will evaluate the patient for further management.





1401: Upon reevaluation, the patient is resting comfortably. I discussed my 

findings with the patient and he understands and agrees with the treatment 

plan.   


Based on the patients age, coexisting illnesses, exam and lab findings the 

decision to treat as an inpatient was made.


The patient remained stable while under my care.


The patient will be evaluated for further management.





1414: Ordered Sodium Chloride 500 ml @ 999 mls/hr IV





1421: I reviewed the patient's case with Jorje RAMESH.  She will 

evaluate the patient for further management.





1426: I [facility] the patient[facility]  He will evaluate the patient for further 

management.





1443: I reassessed the patient. Surgery is at bedside.





Medical Decision


Differential diagnoses includes but is not limited to gastritis, peptic ulcer 

disease, GERD, gallbladder disease, pancreatitis, small bowel obstruction, 

acute coronary syndrome, pericarditis, ischemic bowel, irritable bowel disease, 

irritable bowel syndrome, appendicitis, diverticulitis, malignancy, hernia, 

urinary tract infection, torsion, perforation, trauma, infectious. 





Patient is a 76-year-old male who presents the ER for abdominal distention and 

abdominal pain.  Labs including CBC, BMP, LFTs, bilirubin are fairly 

unremarkable.  Normal lipase.  UA was negative.  CT of abdomen and pelvis shows 

sigmoid volvulus.  Patient was given fluids.  Patient was given IV pain meds.  

Discussed case with general surgery and GI.  General surgery recommended 

admission to internal medicine.  Consequently consulted internal medicine.  

Updated family at bedside.





Medication Reconcilliation


Current Medication List:  was personally reviewed by me





Blood Pressure Screening


Patient's blood pressure:  Low blood pressure





Consults


Time Called:  1341


Consulting Physician:  Dr. Donato - General Surgery


Returned Call:  [phone]


I reviewed the patient's case with Dr. Donato - General Surgery.  He will 

evaluate the patient for further management.


Additional Consults:  


   Time Called:  1414


   Consulted Physician:  Jorje RAMESH


   Returned Call:  1421


Additional Comments:


I reviewed the patient's case with Jorje RAMESH.  She will evaluate 

the patient for further management.


   Time Called:  1428


   Consulted Physician:  Dr. Donaldo Lockett


   Returned Call:  [phone]


Additional Comments:


I reviewed the patient's [facility]  He will evaluate the patient for further management.





Impression





 Primary Impression:  


 Sigmoid volvulus





Scribe Attestation


The scribe's documentation has been prepared under my direction and personally 

reviewed by me in its entirety. I confirm that the note above accurately 

reflects all work, treatment, procedures, and medical decision making performed 

by me.





Departure Information


Dispostion


Being Evaluated By Hospitalist





Referrals


Johnnie Reis M.D. (PCP)





Patient Instructions


My [facility]

## 2017-07-27 NOTE — GASTROINTESTINAL CONSULTATION
Gastrointestinal Consultation


Date of Consultation:  Jul 27, 2017


Attending Physician:  Dr. Blanco


Consulting Physician:  Cori Panchal PA-C


Reason for Consultation:  Sigmoid volvulus


History of Present Illness


Patient is a 76 year old male with a past medical history of cervical spinal 

disease, hypertension, constipation, & right sided hemicolectomy who presented 

to the ER for complaints of abdominal bloating and discomfort. He reports that 

during the course of the past week, he reports that his abdomen became 

significantly distended. He reports that he does not have pain, but feels a 

discomfort on the left side rated as a 5/10. He describes the discomfort as a 

pressure. He reports constipation, but has dealt with this chronically since 

his hemicolectomy with ileocolonic anastomosis. He has followed with Amy MAYORGA of [facility] for his constipation. He denies rectal bleeding, 

fevers, chills, sweats, or diarrhea. 





In the ED, he was evaluated and underwent an abdominal xray that indicated 

sigmoid volvulus. General surgery evaluated patient and recommended trying 

endoscopic decompression prior to consideration of surgery.





Past Medical/Surgical History


Medical Problems:


(1) Rhabdomyolysis


Status: Acute  





(2) Weakness


Status: Acute  








Past Medical History:


As per HPI


Past Surgical History:


cataract surgery, hemicolectomy with ileocolonic anastomosis, knee replacement





Family History





Diabetes mellitus


FH: breast cancer





Social History


Smoking Status:  Former Smoker


Alcohol Use:  none


Drug Use:  none


Marital Status:  , 


Housing Status:  lives with significant other


Occupation Status:  retired





Allergies


Coded Allergies:  


     No Known Allergies (Verified , 2/21/17)





Current Medications





Home Meds and Scripts








 Medications  Dose


 Route/Sig


 Max Daily Dose Days Date Category


 


 Zantac


  (Ranitidine HCl)


 150 Mg Tab  150 Mg


 PO BID


    7/27/17 Reported


 


 Sinemet


 25MG/100MG


  (Carbidopa/Levodopa)


 Tab  1 Tab


 PO TID


    7/27/17 Reported


 


 Zoloft


  (Sertraline HCl)


 25 Mg Tab  1 Tab


 PO DAILY


   30 4/18/17 Reported


 


 Zofran


  (Ondansetron HCl)


 4 Mg Tab  4 Mg


 PO Q6HRS PRN


    4/18/17 Reported


 


 Desenex Shake


 Powder


  (Miconazole


 Nitrate) 43


 Appln/43 Gm Powd  1 Appln


 EXT BID PRN


   30 2/27/17 Rx


 


 Vitamin B12


  (Cyanocobalamin)


 1,000 Mcg Tab  1,000 Mcg


 PO DAILY


   30 2/27/17 Rx


 


 Klonopin


  (Clonazepam) 0.5


 Mg Tab  0.25-0.5 Mg


 PO HS PRN


    2/27/17 Rx


 


 Vitamin D3


  (Cholecalciferol)


 2,000 Unit Cap  2,000 Units


 PO DAILY


   90 2/21/17 Reported


 


 Polyethylene


 Glycol 3350


  (Polyethylene


 Glycol 3350


  (Bulk) 1 Pow Pow  17 Gm


 PO BID


    2/21/17 Reported


 


 Aspirin Ec


  (Aspirin) 81 Mg


 Tab  81 Mg


 PO DAILY


    2/21/17 Reported


 


 Neurontin


  (Gabapentin) 300


 Mg Cap  300 Mg


 PO QID


    7/25/14 Reported


 


 Zyloprim


  (Allopurinol) 300


 Mg Tab  300 Mg


 PO DAILY


    7/25/14 Reported











Review of Systems


Constitutional:  No fever, No chills


Eyes:  No problem reported


Respiratory:  No cough, No shortness of breath, No dyspnea on exertion


Cardiac:  No chest pain


Abdomen:  + pain, + constipation, No nausea, No vomiting, No diarrhea


Musculoskeletal:  No joint pain


Psych:  No problem reported


Endo:  No problem reported


Skin:  No problem reported





Physical Exam











  Date Time  Temp Pulse Resp B/P (MAP) Pulse Ox O2 Delivery O2 Flow Rate FiO2


 


7/27/17 15:04  70 18 154/80 96 Room Air  


 


7/27/17 14:19  86 20 161/88 97   


 


7/27/17 11:49  76 16 150/79 93   


 


7/27/17 10:36 36.8 90 18 97/61 93 Room Air  








General Appearance:  WD/WN, no apparent distress


Eyes:  normal inspection, PERRL


Respiratory/Chest:  lungs clear


Cardiovascular:  regular rate, rhythm


Abdomen:  + pertinent finding (distended abdomen, high pitched bowel sounds, 

non tender on exam, no peritoneal signs)


Extremities:  non-tender


Neurologic/Psych:  alert, oriented x 3


Skin:  normal color





Laboratory Results





Last 24 Hours








Test


  7/27/17


10:50 7/27/17


11:40


 


White Blood Count 11.12 K/uL  


 


Red Blood Count 5.03 M/uL  


 


Hemoglobin 15.8 g/dL  


 


Hematocrit 47.3 %  


 


Mean Corpuscular Volume 94.0 fL  


 


Mean Corpuscular Hemoglobin 31.4 pg  


 


Mean Corpuscular Hemoglobin


Concent 33.4 g/dl 


  


 


 


Platelet Count 246 K/uL  


 


Mean Platelet Volume 10.3 fL  


 


Neutrophils (%) (Auto) 73.2 %  


 


Lymphocytes (%) (Auto) 14.0 %  


 


Monocytes (%) (Auto) 11.7 %  


 


Eosinophils (%) (Auto) 0.6 %  


 


Basophils (%) (Auto) 0.1 %  


 


Neutrophils # (Auto) 8.13 K/uL  


 


Lymphocytes # (Auto) 1.56 K/uL  


 


Monocytes # (Auto) 1.30 K/uL  


 


Eosinophils # (Auto) 0.07 K/uL  


 


Basophils # (Auto) 0.01 K/uL  


 


RDW Standard Deviation 46.5 fL  


 


RDW Coefficient of Variation 13.6 %  


 


Immature Granulocyte % (Auto) 0.4 %  


 


Immature Granulocyte # (Auto) 0.05 K/uL  


 


Sodium Level 136 mmol/L  


 


Potassium Level 3.4 mmol/L  


 


Chloride Level 99 mmol/L  


 


Carbon Dioxide Level 32 mmol/L  


 


Anion Gap 5.0 mmol/L  


 


Blood Urea Nitrogen 14 mg/dl  


 


Creatinine 1.20 mg/dl  


 


Est Creatinine Clear Calc


Drug Dose 65.7 ml/min 


  


 


 


Estimated GFR (


American) 68.1 


  


 


 


Estimated GFR (Non-


American 58.8 


  


 


 


BUN/Creatinine Ratio 11.3  


 


Random Glucose 122 mg/dl  


 


Calcium Level 9.9 mg/dl  


 


Total Bilirubin 1.1 mg/dl  


 


Direct Bilirubin 0.2 mg/dl  


 


Aspartate Amino Transf


(AST/SGOT) 21 U/L 


  


 


 


Alanine Aminotransferase


(ALT/SGPT) 11 U/L 


  


 


 


Alkaline Phosphatase 120 U/L  


 


Total Protein 8.1 gm/dl  


 


Albumin 4.1 gm/dl  


 


Lipase 122 U/L  


 


Urine Color  YELLOW 


 


Urine Appearance  CLEAR 


 


Urine pH  8.5 


 


Urine Specific Gravity  1.019 


 


Urine Protein  NEG 


 


Urine Glucose (UA)  NEG 


 


Urine Ketones  NEG 


 


Urine Occult Blood  NEG 


 


Urine Nitrite  NEG 


 


Urine Bilirubin  NEG 


 


Urine Urobilinogen  NEG 


 


Urine Leukocyte Esterase  MODERATE 


 


Urine WBC (Auto)  1-5 /hpf 


 


Urine RBC (Auto)  0-4 /hpf 


 


Urine Hyaline Casts (Auto)  1-5 /lpf 


 


Urine Epithelial Cells (Auto)  10-20 /lpf 


 


Urine Bacteria (Auto)  NEG 











Impression


Patient is a 76 year old male with a sigmoid volvulus.





Plan


1) Keep NPO for endoscopic decompression tomorrow. 


2) Should volvulus reoccur after decompression, agree with considering surgical 

intervention at that time. 


3) Supportive care per primary team. 





Thank you for allowing us to participate in the care of this patient. If you 

should have any further questions or concerns, do not hesitate to contact us. 





Agree with Cori Sutton PAC as above


Abd:  Distended, Hyperactive BS


Colonoscopy in AM for decompression


Appreciate surgical input

## 2017-07-27 NOTE — DIAGNOSTIC IMAGING REPORT
CT SCAN OF THE ABDOMEN AND PELVIS WITH IV CONTRAST



CLINICAL HISTORY:   Generalized abdominal pain. Bloating.



COMPARISON STUDY:  Abdominal CT dated 8/24/2014.



TECHNIQUE: Following the IV administration of  94 cc of Optiray 320, CT scan of

the abdomen and pelvis is performed from the lung bases to the proximal femora.

Images are reviewed in the axial, sagittal, and coronal planes. IV contrast was

administered without complication. Automated dose control exposure was utilized.

A dose lowering technique was utilized adhering to the principles of ALARA. The

Examination is degraded by streak artifact from the patient's arms which could

not be elevated above the abdomen. There is also motion artifact.



CT DOSE: 1654.53 mGy.cm



FINDINGS:



Lung bases: The heart is enlarged and without pericardial effusion. There are

coronary artery calcifications. The lung bases are clear noting bibasilar

atelectasis. There is a tiny hiatal hernia.



Liver: The contrast-enhanced liver is normal in size, contour, and attenuation.

There is no intrahepatic biliary ductal dilatation. The hepatic veins and portal

veins are patent.



Gallbladder: Unremarkable.



Spleen: Normal in size and attenuation. There are calcified splenic granulomas.



Pancreas: Atrophic and grossly unremarkable.



Adrenal glands: Unremarkable.



Kidneys: The contrast enhanced kidneys are atrophic and without hydronephrosis.

Next renal pelvises noted on the left. The kidneys enhance symmetrically. A left

renal cyst measures 8.5 cm.



Abdominal vasculature: The abdominal aorta is normal in course and caliber

noting advanced atherosclerotic calcification.



Bowel: There are postoperative changes from right hemicolectomy with ileocolic

anastomosis. The small bowel loops are normal in caliber. The sigmoid colon is

tortuous. There is focal twisting of the sigmoid colon upon itself seen to the

right of midline in the pelvis on axial image #318. The upstream sigmoid colon

is distended and gas-filled number up to 8.5 cm in diameter. There is

inflammatory stranding with vascular congestion and trace fluid seen around the

twisted loop in the pelvis on axial image #309. The appearance is consistent

with sigmoid volvulus. No wall thickening is identified. The more proximal colon

is distended with gas and fluid. Scattered colonic diverticula are noted.



Peritoneum: There is no intraperitoneal free air or abdominal ascites.



Lymphadenopathy: None.



Pelvic viscera: The prostate gland is enlarged and heterogeneous measuring 5.7

cm in transverse diameter. There is median lobe hypertrophy. The bladder wall is

mildly thickened and trabeculated consistent with chronic outlet obstruction.



Skeletal structures: The skeletal structures are osteopenic. There is a mild

compression deformity of L5. Moderate to advanced lumbosacral spondylosis is

observed. No lytic or blastic lesions are seen.





IMPRESSION:



1. Streak and motion artifact degraded examination



2. 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 There is mesenteric edema with

venous engorgement around the twisted loop in the pelvis. Surgical consultation

is advised.



3. The upstream colon is distended and fluid-filled. No intraperitoneal free air

is seen.



4. There are postoperative changes from right hemicolectomy with ileocolic

anastomosis. The small bowel loops are normal in caliber.



5. Cardiomegaly.



6. Prostatomegaly with evidence of chronic bladder outlet obstruction.



7. Additional findings as above.







Electronically signed by:  Arcenio Doshi M.D.

7/27/2017 12:46 PM



Dictated Date/Time:  7/27/2017 12:34 PM

## 2017-07-28 VITALS
SYSTOLIC BLOOD PRESSURE: 148 MMHG | DIASTOLIC BLOOD PRESSURE: 77 MMHG | HEART RATE: 84 BPM | OXYGEN SATURATION: 94 % | TEMPERATURE: 97.7 F

## 2017-07-28 VITALS
OXYGEN SATURATION: 94 % | TEMPERATURE: 98.96 F | HEART RATE: 88 BPM | SYSTOLIC BLOOD PRESSURE: 165 MMHG | DIASTOLIC BLOOD PRESSURE: 84 MMHG

## 2017-07-28 VITALS
TEMPERATURE: 97.7 F | SYSTOLIC BLOOD PRESSURE: 148 MMHG | OXYGEN SATURATION: 94 % | HEART RATE: 75 BPM | DIASTOLIC BLOOD PRESSURE: 74 MMHG

## 2017-07-28 VITALS
DIASTOLIC BLOOD PRESSURE: 77 MMHG | SYSTOLIC BLOOD PRESSURE: 143 MMHG | OXYGEN SATURATION: 96 % | TEMPERATURE: 97.7 F | HEART RATE: 82 BPM

## 2017-07-28 VITALS
SYSTOLIC BLOOD PRESSURE: 163 MMHG | HEART RATE: 81 BPM | OXYGEN SATURATION: 95 % | DIASTOLIC BLOOD PRESSURE: 81 MMHG | TEMPERATURE: 99.32 F

## 2017-07-28 VITALS
OXYGEN SATURATION: 96 % | TEMPERATURE: 98.24 F | HEART RATE: 74 BPM | DIASTOLIC BLOOD PRESSURE: 78 MMHG | SYSTOLIC BLOOD PRESSURE: 145 MMHG

## 2017-07-28 VITALS
DIASTOLIC BLOOD PRESSURE: 70 MMHG | SYSTOLIC BLOOD PRESSURE: 154 MMHG | TEMPERATURE: 98.42 F | OXYGEN SATURATION: 95 % | HEART RATE: 74 BPM

## 2017-07-28 VITALS — OXYGEN SATURATION: 96 %

## 2017-07-28 VITALS — OXYGEN SATURATION: 94 % | HEART RATE: 77 BPM

## 2017-07-28 VITALS
TEMPERATURE: 98.24 F | HEART RATE: 65 BPM | OXYGEN SATURATION: 95 % | DIASTOLIC BLOOD PRESSURE: 73 MMHG | SYSTOLIC BLOOD PRESSURE: 150 MMHG

## 2017-07-28 LAB
ANION GAP SERPL CALC-SCNC: 10 MMOL/L (ref 3–11)
BUN SERPL-MCNC: 15 MG/DL (ref 7–18)
BUN/CREAT SERPL: 13.4 (ref 10–20)
CALCIUM SERPL-MCNC: 8.9 MG/DL (ref 8.5–10.1)
CHLORIDE SERPL-SCNC: 100 MMOL/L (ref 98–107)
CO2 SERPL-SCNC: 30 MMOL/L (ref 21–32)
CREAT CL PREDICTED SERPL C-G-VRATE: 71.7 ML/MIN
CREAT SERPL-MCNC: 1.1 MG/DL (ref 0.6–1.4)
EOSINOPHIL NFR BLD AUTO: 244 K/UL (ref 130–400)
GLUCOSE SERPL-MCNC: 140 MG/DL (ref 70–99)
HCT VFR BLD CALC: 44.2 % (ref 42–52)
MCH RBC QN AUTO: 31 PG (ref 25–34)
MCHC RBC AUTO-ENTMCNC: 33.3 G/DL (ref 32–36)
MCV RBC AUTO: 93.2 FL (ref 80–100)
PMV BLD AUTO: 10.2 FL (ref 7.4–10.4)
POTASSIUM SERPL-SCNC: 3.1 MMOL/L (ref 3.5–5.1)
RBC # BLD AUTO: 4.74 M/UL (ref 4.7–6.1)
SODIUM SERPL-SCNC: 140 MMOL/L (ref 136–145)
WBC # BLD AUTO: 13.39 K/UL (ref 4.8–10.8)

## 2017-07-28 PROCEDURE — 0D7N8ZZ DILATION OF SIGMOID COLON, VIA NATURAL OR ARTIFICIAL OPENING ENDOSCOPIC: ICD-10-PCS | Performed by: INTERNAL MEDICINE

## 2017-07-28 RX ADMIN — INSULIN ASPART SCH UNITS: 100 INJECTION, SOLUTION INTRAVENOUS; SUBCUTANEOUS at 05:36

## 2017-07-28 RX ADMIN — INSULIN ASPART SCH UNITS: 100 INJECTION, SOLUTION INTRAVENOUS; SUBCUTANEOUS at 12:00

## 2017-07-28 RX ADMIN — SODIUM CHLORIDE SCH MLS/HR: 900 INJECTION, SOLUTION INTRAVENOUS at 16:04

## 2017-07-28 RX ADMIN — ONDANSETRON PRN MG: 2 INJECTION INTRAMUSCULAR; INTRAVENOUS at 05:47

## 2017-07-28 RX ADMIN — POTASSIUM CHLORIDE SCH MLS/HR: 10 INJECTION, SOLUTION INTRAVENOUS at 17:19

## 2017-07-28 RX ADMIN — METRONIDAZOLE SCH MLS/HR: 500 INJECTION, SOLUTION INTRAVENOUS at 18:22

## 2017-07-28 RX ADMIN — SODIUM CHLORIDE AND POTASSIUM CHLORIDE SCH MLS/HR: 9; 1.49 INJECTION, SOLUTION INTRAVENOUS at 18:22

## 2017-07-28 RX ADMIN — RANITIDINE HYDROCHLORIDE SCH MLS/HR: 25 INJECTION, SOLUTION INTRAMUSCULAR; INTRAVENOUS at 10:59

## 2017-07-28 RX ADMIN — INSULIN ASPART SCH UNITS: 100 INJECTION, SOLUTION INTRAVENOUS; SUBCUTANEOUS at 18:00

## 2017-07-28 RX ADMIN — CARBIDOPA AND LEVODOPA SCH TAB: 25; 100 TABLET ORAL at 21:21

## 2017-07-28 RX ADMIN — POTASSIUM CHLORIDE SCH MLS/HR: 10 INJECTION, SOLUTION INTRAVENOUS at 11:43

## 2017-07-28 RX ADMIN — CARBIDOPA AND LEVODOPA SCH TAB: 25; 100 TABLET ORAL at 16:09

## 2017-07-28 RX ADMIN — RANITIDINE HYDROCHLORIDE SCH MLS/HR: 25 INJECTION, SOLUTION INTRAMUSCULAR; INTRAVENOUS at 18:22

## 2017-07-28 RX ADMIN — CARBIDOPA AND LEVODOPA SCH TAB: 25; 100 TABLET ORAL at 08:54

## 2017-07-28 RX ADMIN — CIPROFLOXACIN SCH MLS/HR: 2 INJECTION, SOLUTION INTRAVENOUS at 16:16

## 2017-07-28 RX ADMIN — SODIUM CHLORIDE SCH MLS/HR: 900 INJECTION, SOLUTION INTRAVENOUS at 02:36

## 2017-07-28 RX ADMIN — POTASSIUM CHLORIDE SCH MLS/HR: 10 INJECTION, SOLUTION INTRAVENOUS at 10:23

## 2017-07-28 RX ADMIN — POTASSIUM CHLORIDE SCH MLS/HR: 10 INJECTION, SOLUTION INTRAVENOUS at 16:04

## 2017-07-28 RX ADMIN — RANITIDINE HYDROCHLORIDE SCH MLS/HR: 25 INJECTION, SOLUTION INTRAMUSCULAR; INTRAVENOUS at 02:35

## 2017-07-28 NOTE — GI REPORT
Procedure Date: 7/28/2017 1:28 PM

Procedure:            Colonoscopy

Indications:          For therapy of volvulus

Medicines:            Monitored Anesthesia Care

Complications:        No immediate complications.

Estimated Blood Loss: Estimated blood loss: none.

Procedure:            Pre-Anesthesia Assessment:

                      - Prior to the procedure, a History and Physical was 

                      performed, and patient medications and allergies were 

                      reviewed. The patient's tolerance of previous 

                      anesthesia was also reviewed. The risks and benefits of 

                      the procedure and the sedation options and risks were 

                      discussed with the patient. All questions were 

                      answered, and informed consent was obtained. Prior 

                      Anticoagulants: The patient has taken aspirin, last 

                      dose was 3 days prior to procedure. ASA Grade 

                      Assessment: III - A patient with severe systemic 

                      disease. After reviewing the risks and benefits, the 

                      patient was deemed in satisfactory condition to undergo 

                      the procedure.

                      After I obtained informed consent, the scope was passed 

                      under direct vision. Throughout the procedure, the 

                      patient's blood pressure, pulse, and oxygen saturations 

                      were monitored continuously. The On-site loaner was 

                      introduced through the anus with the intention of 

                      advancing to the ileum. The scope was advanced to the 

                      transverse colon before the procedure was aborted. 

                      Medications were given. The colonoscopy was performed 

                      with moderate difficulty due to inadequate bowel prep. 

                      Successful completion of the procedure was aided by 

                      changing the patient to a supine position and using 

                      manual pressure. The patient tolerated the procedure 

                      fairly well. The quality of the bowel preparation was 

                      unsatisfactory. The rectum was photographed.

Findings:

     Discontinuous areas of nonbleeding ulcerated mucosa with no stigmata of 

     recent bleeding were present in the sigmoid colon, in the descending 

     colon and in the transverse colon. Decompression of the volvulus was 

     attempted, and partial decompression was achieved.

     Copious quantities of liquid semi-solid stool was found in the rectum, 

     in the sigmoid colon, in the descending colon and in the transverse 

     colon, interfering with visualization. Lavage of the area was performed 

     using copious amounts, resulting in incomplete clearance with continued 

     poor visualization.

Impression:           - Preparation of the colon was unsatisfactory.

                      - Mucosal ulceration.

                      - Stool in the rectum, in the sigmoid colon, in the 

                      descending colon and in the transverse colon.

                      - No specimens collected.

Recommendation:       - Return patient to [facility] for [facility] care.

                      - Cipro (ciprofloxacin) 400 mg IV q 12 hr for 10 days.

                      - Flagyl (metronidazole) 500 mg IV loading dose 

                      followed by 500 mg IV q 8 hr for 10 days.

                      - Refer to a surgeon today.

Gerard Xiong DO

7/28/2017 2:43:31 PM

This report has been signed electronically.

Note Initiated On: 7/28/2017 1:28 PM

     I attest to the content of the Intraoperative Record and orders 

     documented therein, exceptions below

## 2017-07-28 NOTE — ANESTHESIOLOGY PROGRESS NOTE
Anesthesia Post Op Note


Date & Time


Jul 28, 2017 at 14:52





Vital Signs


Pain Intensity:  4





Vital Signs Past 12 Hours








  Date Time  Temp Pulse Resp B/P (MAP) Pulse Ox O2 Delivery O2 Flow Rate FiO2


 


7/28/17 14:50  82 20 168/82 (110) 95 Nasal Cannula 3 


 


7/28/17 14:40  80 20 156/81 (106) 96 Nasal Cannula 3 


 


7/28/17 12:24 37.1 84 20 159/93 (115) 94 Nasal Cannula 3 


 


7/28/17 11:58 37.4 81 16 163/81 95 Nasal Cannula 2.0 


 


7/28/17 07:36 37.2 94 17 165/84 (111) 94   





  88      


 


7/28/17 07:30      Nasal Cannula 2.0 











Notes


Mental Status:  alert / awake / arousable, participated in evaluation


Pt Amnestic to Procedure:  Yes


Nausea / Vomiting:  adequately controlled


Pain:  adequately controlled


Airway Patency, RR, SpO2:  stable & adequate


BP & HR:  stable & adequate


Hydration State:  stable & adequate


Anesthetic Complications:  no major complications apparent

## 2017-07-28 NOTE — HOSPITALIST PROGRESS NOTE
Hospitalist Progress Note


Date of Service


Jul 28, 2017.


 (Sarah Langston PA-C)





Subjective


Pt evaluation today including:  conversation w/ patient, conversation w/ family

, physical exam, chart review, lab review


PO Intake:  NPO


The patient was seen and examined this morning. Pts daughter is present at 

bedside.  The patient notes he has no abdominal pain, but now is nauseous, and 

has vomited several times this morning, and feels more uncomfortable.  He has a 

difficult time taking deep breaths this morning due to abd distension.  He 

denies any fevers, sweats, chills, chest pain or shortness of breath.  





Concerns and questions that his daughter had were answered/addressed.





   Additional Comments:


Constitutional: No fever, sweats or chills


Eyes: No diplopia, no worsening or blurred vision


ENT: normal hearing, no trouble swallowing


Respiratory: No cough, sputum, dyspnea at rest or on exertion,+ on 2 L via NC 

and normally does not require supplemental O2


Cardiovascular: No chest pain, tightness or palpitations


Abdomen: + abdominal distension, + nausea, + vomiting, Last BM on 7/25


Musculoskeletal: No joint pain, calf pain, swelling


Neurologic: No weakness, numbness/tingling


 (Sarah Langston PA-C)





Objective


Vital Signs











  Date Time  Temp Pulse Resp B/P (MAP) Pulse Ox O2 Delivery O2 Flow Rate FiO2


 


7/28/17 07:36 37.2 94 17 165/84 (111) 94   





  88      


 


7/28/17 07:30      Nasal Cannula 2.0 


 


7/28/17 00:43  77   94   


 


7/27/17 23:00      Room Air  


 


7/27/17 23:00 36.9 89 22 159/86 (110) 93 Room Air  


 


7/27/17 17:10      Room Air  


 


7/27/17 17:10 36.6 75 18 131/67 (88) 94 Room Air  


 


7/27/17 16:56  75 20 140/80 93   


 


7/27/17 16:53  78 18 140/80 93 Room Air  


 


7/27/17 15:57     96 Room Air  


 


7/27/17 15:04  70 18 154/80 96 Room Air  


 


7/27/17 14:19  86 20 161/88 97   


 


7/27/17 11:49  76 16 150/79 93   


 


7/27/17 10:36 36.8 90 18 97/61 93 Room Air  








 (Sarah Langston PA-C)





Physical Exam


Notes:


General: awake, alert, NAD, uncomfortable. 


Head: Normocephalic, atraumatic


ENT: PERRL, EOMI, no pharyngeal exudate, mucous membranes moist


Chest: Clear to auscultation, on 2L via NC, no adventitious breath sounds


Cardiac: Regular rate and rhythm, no murmur, no JVD, normal peripheral pulses, 

good capillary refill


Abdominal: + Distention, high-pitched bowel sounds in RLQ, very hypoactive 

bowel sounds in other quadrants, + nausea and vomiting at bedside, + discomfort 

with palpation by pt denies pain,  no rebound, guarding or tenderness


Extremities: + abrasion over R shin, otherwise normal inspection, no peripheral 

edema or erythema, calfs nontender to palpation


Psych: Normal mood and affect


Neuro: AAO x 3,  speech is clear, no peripheral sensory deficits





 (Sarah Langston PA-C)





Laboratory Results





Last 24 Hours








Test


  7/27/17


10:50 7/27/17


11:40 7/27/17


20:54 7/28/17


05:33


 


White Blood Count 11.12 K/uL    


 


Red Blood Count 5.03 M/uL    


 


Hemoglobin 15.8 g/dL    


 


Hematocrit 47.3 %    


 


Mean Corpuscular Volume 94.0 fL    


 


Mean Corpuscular Hemoglobin 31.4 pg    


 


Mean Corpuscular Hemoglobin


Concent 33.4 g/dl 


  


  


  


 


 


Platelet Count 246 K/uL    


 


Mean Platelet Volume 10.3 fL    


 


Neutrophils (%) (Auto) 73.2 %    


 


Lymphocytes (%) (Auto) 14.0 %    


 


Monocytes (%) (Auto) 11.7 %    


 


Eosinophils (%) (Auto) 0.6 %    


 


Basophils (%) (Auto) 0.1 %    


 


Neutrophils # (Auto) 8.13 K/uL    


 


Lymphocytes # (Auto) 1.56 K/uL    


 


Monocytes # (Auto) 1.30 K/uL    


 


Eosinophils # (Auto) 0.07 K/uL    


 


Basophils # (Auto) 0.01 K/uL    


 


RDW Standard Deviation 46.5 fL    


 


RDW Coefficient of Variation 13.6 %    


 


Immature Granulocyte % (Auto) 0.4 %    


 


Immature Granulocyte # (Auto) 0.05 K/uL    


 


Prothrombin Time 11.1 SECONDS    


 


Prothromb Time International


Ratio 1.0 


  


  


  


 


 


Sodium Level 136 mmol/L    


 


Potassium Level 3.4 mmol/L    


 


Chloride Level 99 mmol/L    


 


Carbon Dioxide Level 32 mmol/L    


 


Anion Gap 5.0 mmol/L    


 


Blood Urea Nitrogen 14 mg/dl    


 


Creatinine 1.20 mg/dl    


 


Est Creatinine Clear Calc


Drug Dose 65.7 ml/min 


  


  


  


 


 


Estimated GFR (


American) 68.1 


  


  


  


 


 


Estimated GFR (Non-


American 58.8 


  


  


  


 


 


BUN/Creatinine Ratio 11.3    


 


Random Glucose 122 mg/dl    


 


Calcium Level 9.9 mg/dl    


 


Total Bilirubin 1.1 mg/dl    


 


Direct Bilirubin 0.2 mg/dl    


 


Aspartate Amino Transf


(AST/SGOT) 21 U/L 


  


  


  


 


 


Alanine Aminotransferase


(ALT/SGPT) 11 U/L 


  


  


  


 


 


Alkaline Phosphatase 120 U/L    


 


Total Protein 8.1 gm/dl    


 


Albumin 4.1 gm/dl    


 


Lipase 122 U/L    


 


Urine Color  YELLOW   


 


Urine Appearance  CLEAR   


 


Urine pH  8.5   


 


Urine Specific Gravity  1.019   


 


Urine Protein  NEG   


 


Urine Glucose (UA)  NEG   


 


Urine Ketones  NEG   


 


Urine Occult Blood  NEG   


 


Urine Nitrite  NEG   


 


Urine Bilirubin  NEG   


 


Urine Urobilinogen  NEG   


 


Urine Leukocyte Esterase  MODERATE   


 


Urine WBC (Auto)  1-5 /hpf   


 


Urine RBC (Auto)  0-4 /hpf   


 


Urine Hyaline Casts (Auto)  1-5 /lpf   


 


Urine Epithelial Cells (Auto)  10-20 /lpf   


 


Urine Bacteria (Auto)  NEG   


 


Bedside Glucose   94 mg/dl  138 mg/dl 


 


Test


  7/28/17


06:31 


  


  


 


 


White Blood Count 13.39 K/uL    


 


Red Blood Count 4.74 M/uL    


 


Hemoglobin 14.7 g/dL    


 


Hematocrit 44.2 %    


 


Mean Corpuscular Volume 93.2 fL    


 


Mean Corpuscular Hemoglobin 31.0 pg    


 


Mean Corpuscular Hemoglobin


Concent 33.3 g/dl 


  


  


  


 


 


RDW Standard Deviation 46.6 fL    


 


RDW Coefficient of Variation 13.6 %    


 


Platelet Count 244 K/uL    


 


Mean Platelet Volume 10.2 fL    


 


Sodium Level 140 mmol/L    


 


Potassium Level 3.1 mmol/L    


 


Chloride Level 100 mmol/L    


 


Carbon Dioxide Level 30 mmol/L    


 


Anion Gap 10.0 mmol/L    


 


Blood Urea Nitrogen 15 mg/dl    


 


Creatinine 1.10 mg/dl    


 


Est Creatinine Clear Calc


Drug Dose 71.7 ml/min 


  


  


  


 


 


Estimated GFR (


American) 75.7 


  


  


  


 


 


Estimated GFR (Non-


American 65.3 


  


  


  


 


 


BUN/Creatinine Ratio 13.4    


 


Random Glucose 140 mg/dl    


 


Calcium Level 8.9 mg/dl    








 (Sarah Langston PA-C)





Assessment and Plan


This is a 77 yo M with PMHx of T2DM with Diabetic Neuropathy, HTN, Gout, GIULIA on 

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 

anxiety disorder who presents with abdominal pain and bloating since Tuesday. 





Sigmoid Volvulus


- Vitals are stable, + high pitch abdominal sounds in RUQ, hypoactive bowel 

sounds otherwise, + distension slightly worse, + N/V 


- GI eval for decompression this morning


- Surgical consult placed and plan to follow along n case there are needs for 

surgical decompression.- I spoke with Dr. Donato this morning and he is aware 

of the patient.  He reports that Dr. Payan is on consult over the weekend 

in case there are needs tomorrow.


- CT 7/27 reviewed showing sigmoid volvulus and appears to be 8 cm at the 

largest point 


- Hx of  diabetic gastroparesis 


- Antiemetic with IV zofran prn


- PT/OT after decompression





T2DM with Diabetic Neuropathy: A1c 5.9 (2/22/17)


- Pt was just taken off metformin by his PCP on Tuesday, Last Hgb A1C was 5.9 

in Feb 2017 be our records. 


- ISS with Accu-Cheks ACHS





Hypokalemia


- K+ replaced this morning 40 meq IV 





Restless leg syndrome


- Gabapentin 300 mg QID once PO intake allowed


- Continue Carbidopa levodopa  mg TID





HTN:


- Cont asa 81 mg daily once PO intake allowed





Gout:


- Allopurinol 300 mg daily once PO intake allowed





GIULIA:


- Will order CPAP tonight as he lives in [place], allow ativan IV prn for 

claustrophobia with cpap and anxiety with hospital stay. 


- clonazepam held due to holding PO meds


- Continue sertraline 25 mg daily when able to take PO





DVT Prophylaxis: Heparin subcutaneous, teds, scds





Code Status: FULL RESUSCITATION





Disposition: Patient from home, 2 sons live with him, PT/OT to eval


 (Sarah Langston PA-C)


PA Physician Supervision Note:





I interviewed and examined the patient. Discussed with Sarah Langston PAC 

and agree with findings and plan as documented in the note. Any exceptions or 

clarifications are listed here: None





Patient presented with distended abdomen, has a history of a hemicolectomy for 

benign tumor, CT scan of the ER is consistent with sigmoid volvulus.  

Gastroenterology performed decompression colonoscopy on July 28. Dr Case 

reported feeling of incomplete resolution, surgery was notified and will be 

involved in watchful waiting, as if colonic surgery is needed would be best 

with a bowel prep, check lactic acid in am with labs, replete K





Patient is an only in  mild discomfort having crampy abdominal, pain upon 

resolution of the cramps has no pain.


Vital signs are stable


Heart is regular lungs are clear


Abdomen is less distended continues to be tympanitic hypoactive bowel sounds 

pain is controlled





Sigmoid volvulus, decompressive colonoscopy, surgery to follow





continues with, pain control with IV morphine, anxiety with IV Ativan and as 

needed antiemetics


clear liquids, npo in am





Documented By:  Jean Betancourt


 (Jean Betancourt M.D.)

## 2017-07-29 VITALS
OXYGEN SATURATION: 93 % | SYSTOLIC BLOOD PRESSURE: 177 MMHG | TEMPERATURE: 98.42 F | DIASTOLIC BLOOD PRESSURE: 81 MMHG | HEART RATE: 62 BPM

## 2017-07-29 VITALS
OXYGEN SATURATION: 94 % | HEART RATE: 68 BPM | DIASTOLIC BLOOD PRESSURE: 74 MMHG | TEMPERATURE: 98.96 F | SYSTOLIC BLOOD PRESSURE: 165 MMHG

## 2017-07-29 VITALS
DIASTOLIC BLOOD PRESSURE: 69 MMHG | HEART RATE: 62 BPM | OXYGEN SATURATION: 92 % | SYSTOLIC BLOOD PRESSURE: 158 MMHG | TEMPERATURE: 98.6 F

## 2017-07-29 VITALS
SYSTOLIC BLOOD PRESSURE: 160 MMHG | HEART RATE: 56 BPM | OXYGEN SATURATION: 92 % | DIASTOLIC BLOOD PRESSURE: 69 MMHG | TEMPERATURE: 98.6 F

## 2017-07-29 LAB
ANION GAP SERPL CALC-SCNC: 6 MMOL/L (ref 3–11)
BUN SERPL-MCNC: 12 MG/DL (ref 7–18)
BUN/CREAT SERPL: 12.9 (ref 10–20)
CALCIUM SERPL-MCNC: 7.9 MG/DL (ref 8.5–10.1)
CHLORIDE SERPL-SCNC: 105 MMOL/L (ref 98–107)
CO2 SERPL-SCNC: 28 MMOL/L (ref 21–32)
CREAT CL PREDICTED SERPL C-G-VRATE: 83 ML/MIN
CREAT SERPL-MCNC: 0.95 MG/DL (ref 0.6–1.4)
GLUCOSE SERPL-MCNC: 109 MG/DL (ref 70–99)
MAGNESIUM SERPL-MCNC: 2.1 MG/DL (ref 1.8–2.4)
POTASSIUM SERPL-SCNC: 3.1 MMOL/L (ref 3.5–5.1)
SODIUM SERPL-SCNC: 139 MMOL/L (ref 136–145)

## 2017-07-29 PROCEDURE — 0D7N8DZ DILATION OF SIGMOID COLON WITH INTRALUMINAL DEVICE, VIA NATURAL OR ARTIFICIAL OPENING ENDOSCOPIC: ICD-10-PCS | Performed by: INTERNAL MEDICINE

## 2017-07-29 RX ADMIN — METRONIDAZOLE SCH MLS/HR: 500 INJECTION, SOLUTION INTRAVENOUS at 02:29

## 2017-07-29 RX ADMIN — INSULIN ASPART SCH UNITS: 100 INJECTION, SOLUTION INTRAVENOUS; SUBCUTANEOUS at 23:35

## 2017-07-29 RX ADMIN — CARBIDOPA AND LEVODOPA SCH TAB: 25; 100 TABLET ORAL at 09:25

## 2017-07-29 RX ADMIN — RANITIDINE HYDROCHLORIDE SCH MLS/HR: 25 INJECTION, SOLUTION INTRAMUSCULAR; INTRAVENOUS at 18:34

## 2017-07-29 RX ADMIN — POTASSIUM CHLORIDE SCH MLS/HR: 10 INJECTION, SOLUTION INTRAVENOUS at 13:55

## 2017-07-29 RX ADMIN — POLYETHYLENE GLYCOL 3350, SODIUM SULFATE ANHYDROUS, SODIUM BICARBONATE, SODIUM CHLORIDE, POTASSIUM CHLORIDE SCH DOSE: 236; 22.74; 6.74; 5.86; 2.97 POWDER, FOR SOLUTION ORAL at 20:39

## 2017-07-29 RX ADMIN — METRONIDAZOLE SCH MLS/HR: 500 INJECTION, SOLUTION INTRAVENOUS at 18:34

## 2017-07-29 RX ADMIN — CARBIDOPA AND LEVODOPA SCH TAB: 25; 100 TABLET ORAL at 20:39

## 2017-07-29 RX ADMIN — POLYETHYLENE GLYCOL 3350, SODIUM SULFATE ANHYDROUS, SODIUM BICARBONATE, SODIUM CHLORIDE, POTASSIUM CHLORIDE SCH DOSE: 236; 22.74; 6.74; 5.86; 2.97 POWDER, FOR SOLUTION ORAL at 15:41

## 2017-07-29 RX ADMIN — CIPROFLOXACIN SCH MLS/HR: 2 INJECTION, SOLUTION INTRAVENOUS at 15:40

## 2017-07-29 RX ADMIN — INSULIN ASPART SCH UNITS: 100 INJECTION, SOLUTION INTRAVENOUS; SUBCUTANEOUS at 12:00

## 2017-07-29 RX ADMIN — POTASSIUM CHLORIDE SCH MLS/HR: 10 INJECTION, SOLUTION INTRAVENOUS at 09:25

## 2017-07-29 RX ADMIN — CIPROFLOXACIN SCH MLS/HR: 2 INJECTION, SOLUTION INTRAVENOUS at 03:48

## 2017-07-29 RX ADMIN — METRONIDAZOLE SCH MLS/HR: 500 INJECTION, SOLUTION INTRAVENOUS at 11:18

## 2017-07-29 RX ADMIN — CARBIDOPA AND LEVODOPA SCH TAB: 25; 100 TABLET ORAL at 13:56

## 2017-07-29 RX ADMIN — POLYETHYLENE GLYCOL 3350, SODIUM SULFATE ANHYDROUS, SODIUM BICARBONATE, SODIUM CHLORIDE, POTASSIUM CHLORIDE SCH DOSE: 236; 22.74; 6.74; 5.86; 2.97 POWDER, FOR SOLUTION ORAL at 23:35

## 2017-07-29 RX ADMIN — RANITIDINE HYDROCHLORIDE SCH MLS/HR: 25 INJECTION, SOLUTION INTRAMUSCULAR; INTRAVENOUS at 02:29

## 2017-07-29 RX ADMIN — SODIUM CHLORIDE AND POTASSIUM CHLORIDE SCH MLS/HR: 9; 1.49 INJECTION, SOLUTION INTRAVENOUS at 23:34

## 2017-07-29 RX ADMIN — LORAZEPAM PRN MLS/MIN: 2 INJECTION INTRAMUSCULAR; INTRAVENOUS at 23:42

## 2017-07-29 RX ADMIN — SODIUM CHLORIDE AND POTASSIUM CHLORIDE SCH MLS/HR: 9; 1.49 INJECTION, SOLUTION INTRAVENOUS at 13:56

## 2017-07-29 RX ADMIN — INSULIN ASPART SCH UNITS: 100 INJECTION, SOLUTION INTRAVENOUS; SUBCUTANEOUS at 06:00

## 2017-07-29 RX ADMIN — INSULIN ASPART SCH UNITS: 100 INJECTION, SOLUTION INTRAVENOUS; SUBCUTANEOUS at 00:00

## 2017-07-29 RX ADMIN — POTASSIUM CHLORIDE SCH MLS/HR: 10 INJECTION, SOLUTION INTRAVENOUS at 11:17

## 2017-07-29 RX ADMIN — INSULIN ASPART SCH UNITS: 100 INJECTION, SOLUTION INTRAVENOUS; SUBCUTANEOUS at 18:00

## 2017-07-29 RX ADMIN — SODIUM CHLORIDE AND POTASSIUM CHLORIDE SCH MLS/HR: 9; 1.49 INJECTION, SOLUTION INTRAVENOUS at 03:48

## 2017-07-29 RX ADMIN — POLYETHYLENE GLYCOL 3350, SODIUM SULFATE ANHYDROUS, SODIUM BICARBONATE, SODIUM CHLORIDE, POTASSIUM CHLORIDE SCH DOSE: 236; 22.74; 6.74; 5.86; 2.97 POWDER, FOR SOLUTION ORAL at 12:39

## 2017-07-29 RX ADMIN — POTASSIUM CHLORIDE SCH MLS/HR: 10 INJECTION, SOLUTION INTRAVENOUS at 12:14

## 2017-07-29 RX ADMIN — RANITIDINE HYDROCHLORIDE SCH MLS/HR: 25 INJECTION, SOLUTION INTRAMUSCULAR; INTRAVENOUS at 12:15

## 2017-07-29 NOTE — GI REPORT
Procedure Date: 7/29/2017 9:58 AM

Procedure:            Colonoscopy

Indications:          Epigastric abdominal pain, Abnormal abdominal x-ray of 

                      the GI tract, For therapy of volvulus

Medicines:            Monitored Anesthesia Care

Complications:        No immediate complications. Estimated blood loss: 

                      Minimal.

Estimated Blood Loss: Estimated blood loss was minimal.

Procedure:            Pre-Anesthesia Assessment:

                      - Prior to the procedure, a History and Physical was 

                      performed, and patient medications, allergies and 

                      sensitivities were reviewed. The patient's tolerance of 

                      previous anesthesia was reviewed.

                      - The risks and benefits of the procedure and the 

                      sedation options and risks were discussed with the 

                      patient. All questions were answered and informed 

                      consent was obtained.

                      - Patient identification and proposed procedure were 

                      verified prior to the procedure by the physician, the 

                      nurse and the anesthetist. The procedure was verified 

                      in the procedure room.

                      - Pre-procedure physical examination revealed no 

                      contraindications to sedation.

                      - ASA Grade Assessment: III - A patient with severe 

                      systemic disease.

                      - After reviewing the risks and benefits, the patient 

                      was deemed in satisfactory condition to undergo the 

                      procedure.

                      - The anesthesia plan was to use general anesthesia.

                      - Immediately prior to administration of medications, 

                      the patient was re-assessed for adequacy to receive 

                      sedatives.

                      - The heart rate, respiratory rate, oxygen saturations, 

                      blood pressure, adequacy of pulmonary ventilation, and 

                      response to care were monitored throughout the 

                      procedure.

                      - The heart rate, respiratory rate, oxygen saturations, 

                      blood pressure, adequacy of pulmonary ventilation, and 

                      response to care were monitored throughout the 

                      procedure.

                      - The physical status of the patient was re-assessed 

                      after the procedure.

                      After I obtained informed consent, the scope was passed 

                      under direct vision. Throughout the procedure, the 

                      patient's blood pressure, pulse, and oxygen saturations 

                      were monitored continuously. The scope was introduced 

                      through the anus and advanced to the ileocolonic 

                      anastomosis. The colonoscopy was performed without 

                      difficulty. The patient tolerated the procedure well. 

                      The quality of the bowel preparation was fair.

Findings:

     The perianal and digital rectal examinations were normal. Pertinent 

     negatives include normal sphincter tone.

     The lumen of the colon (entire examined portion) was moderately dilated. 

     Decompression of the volvulus was attempted, and partial decompression 

     was achieved. Following the maneuver, a tube was placed to maintain the 

     decompression. Estimated blood loss: none.

Impression:           - Dilated in the entire examined colon.

                      - No specimens collected.

Recommendation:       - Return patient to [facility] for ongoing care.

                      - Perform a flat plate abdominal x-ray today.

                      - NPO.

                      - Surgical referral for sigmoid colectomy

                      - Flush rectal tube every 4 hours with 100 ml of Colyte 

                      solution.

Eulogio Kent D.O.

Eulogio Kent, 

7/29/2017 10:36:47 AM

This report has been signed electronically.

Note Initiated On: 7/29/2017 9:58 AM

     I attest to the content of the Intraoperative Record and orders 

     documented therein, exceptions below

## 2017-07-29 NOTE — SURGERY PROGRESS NOTE
Surgery Progress Note


Date of Service


Jul 29, 2017.





Subjective


No nausea, No vomiting


Pain-free at present.  He underwent a colonoscopy for decompression again 

today.  Postprocedure KUB showed decompression of the colon.  The rectal tube 

was left in place.  The tip was near the splenic flexure.





Objective


Vital Signs:











  Date Time  Temp Pulse Resp B/P (MAP) Pulse Ox O2 Delivery O2 Flow Rate FiO2


 


7/29/17 11:27 37.2 68 16 165/74 (104) 94 Nasal Cannula 2.0 


 


7/29/17 11:05 37 60 16 138/73 98 Nasal Cannula 3 


 


7/29/17 10:50  61 16 140/68 99 Nasal Cannula 3 


 


7/29/17 10:42 37.2 60 14 135/69 99 Nasal Cannula 3 


 


7/29/17 09:39      Room Air  


 


7/29/17 07:26 37.0 62 16 158/69 (98) 92 Room Air  


 


7/28/17 23:20      Room Air  


 


7/28/17 22:55 36.8 74 16 145/78 (100) 96 Room Air  


 


7/28/17 19:00 36.8 65 16 150/73 (98) 95 Nasal Cannula 2.0 


 


7/28/17 17:55 36.9 74 16 154/70 (98) 95  2.0 


 


7/28/17 16:55 36.5 75 16 148/74 (98) 94 Nasal Cannula 2.0 


 


7/28/17 16:25 36.5 84 18 148/77 (100) 94 Nasal Cannula 2.0 


 


7/28/17 15:55 36.5 82 18 143/77 (99) 96 Nasal Cannula 2.0 








Abdomen:  non tender, soft, + distended (mild)


Laboratory Results:





Results Past 24 Hours








Test


  7/28/17


16:59 7/28/17


21:03 7/28/17


23:32 7/29/17


05:40 Range/Units


 


 


Bedside Glucose 135 94 97 111 70-99  mg/dl


 


Test


  7/29/17


07:14 7/29/17


12:20 


  


  Range/Units


 


 


Sodium Level 139    136-145  mmol/L


 


Potassium Level 3.1    3.5-5.1  mmol/L


 


Chloride Level 105      mmol/L


 


Carbon Dioxide Level 28    21-32  mmol/L


 


Anion Gap 6.0    3-11  mmol/L


 


Blood Urea Nitrogen 12    7-18  mg/dl


 


Creatinine


  0.95


  


  


  


  0.60-1.40


mg/dl


 


Est Creatinine Clear Calc


Drug Dose 83.0


  


  


  


   ml/min


 


 


Estimated GFR (


American) 90.4


  


  


  


   


 


 


Estimated GFR (Non-


American 78.0


  


  


  


   


 


 


BUN/Creatinine Ratio 12.9    10-20  


 


Random Glucose 109    70-99  mg/dl


 


Lactic Acid Level 1.1    0.4-2.0  mmol/L


 


Calcium Level 7.9    8.5-10.1  mg/dl


 


Magnesium Level 2.1    1.8-2.4  mg/dl


 


Bedside Glucose  101   70-99  mg/dl








Microbiology Results


7/29/17 C.difficile Toxin B Gene (PCR) - Final, Complete


          No C. difficile toxin B gene detected





Assessment & Plan


Sigmoid volvulus


Now decompressed.  


I agree with the Colyte in attempt to prep the bowel as much as possible.


There was no need for immediate surgical intervention.


If the bowel can be prepped it would make it more likely that a primary 

anastomosis would be able to be performed and would be successful.


We'll continue to follow with you.

## 2017-07-29 NOTE — [facility] POST OPERATIVE BRIEF NOTE
Immediate Operative Summary


Operative Date


Jul 29, 2017.





Pre-Operative Diagnosis





Sigmoid Volvulus





Post-Operative Diagnosis





Sigmoid Volvulus





Procedure(s) Performed





Colonic Decompression with rectal tube insertion





Surgeon


Dr. Kent





Assistant Surgeon(s)


none





Estimated Blood Loss


0





Findings


dilated colon


rectal tube placed





Specimens





Any specimens obtained by endo staff





Drains


REctal tube placed





Anesthesia


MAC





Complication(s)


None





Disposition


Recovery Room / PACU

## 2017-07-29 NOTE — DIAGNOSTIC IMAGING REPORT
PA CHEST RADIOGRAPH AND UPRIGHT AND SUPINE AP RADIOGRAPHS OF THE ABDOMEN



CLINICAL HISTORY: Assess volvulus    



COMPARISON STUDY:  Chest radiograph February 22, 2017 and CT of the abdomen and

pelvis July 27, 2017. 



FINDINGS:  There is no pneumothorax or pleural effusion. Bibasilar opacities

favor atelectasis. There may be a trace left pleural effusion. There is no

evidence of pulmonary edema. Calcified right lung nodule is noted.



There is no free air. Marked colonic distention has increased since prior exam.

Colonic loops measure up to 12 cm in caliber. There has been interval

development of small bowel dilatation since exam of July 27, 2017. There is a

paucity of rectal gas. There is no radiographic evidence of pneumatosis or

portal venous gas.



IMPRESSION:  Increase in marked colonic gaseous distention and interval

development of small bowel dilatation since CT of July 27, 2017. The findings

are consistent with a persistent distal colonic obstruction and favor a sigmoid

volvulus.  Discussed with Dr. Betancourt at time of dictation.







Electronically signed by:  Roge Diaz M.D.

7/29/2017 9:10 AM



Dictated Date/Time:  7/29/2017 8:57 AM

## 2017-07-29 NOTE — HOSPITALIST PROGRESS NOTE
Hospitalist Progress Note


Date of Service


Jul 29, 2017.


 (Sarah Lagnston PA-C)





Subjective


Pt evaluation today including:  conversation w/ patient, conversation w/ family

, physical exam, chart review, lab review, review of studies, conversation w/ 

consultant


Pain:  None


PO Intake:  NPO


Voiding:  no voiding problems


The patient is still very distended and has discomfort but denies any pain. He 

is not nauseous or vomiting this morning, but reports having multiple bowel 

movements overnight. 





Pt was seen with Dr. Kent, who is taking him to the endoscopy suite for acute 

decompression and rectal tube placement now.





   Constitutional:  No fever, No chills, No sweats


   Eyes:  No discharge, No diplopia


   ENT:  No nasal symptoms, No trouble swallowing


   Respiratory:  No shortness of breath, No dyspnea on exertion


   Cardiovascular:  No chest pain, No palpitations


   Abdomen:  No pain, No nausea, No vomiting, No diarrhea, No constipation


   Musculoskeletal:  No joint pain, No muscle pain


   Male :  No dysuria, No incontinence


   Neurologic:  No weakness, No numbness/tingling


   Endo:  No fatigue (Sarah Langston PA-C)





Objective


Vital Signs











  Date Time  Temp Pulse Resp B/P (MAP) Pulse Ox O2 Delivery O2 Flow Rate FiO2


 


7/29/17 07:26 37.0 62 16 158/69 (98) 92 Room Air  


 


7/28/17 23:20      Room Air  


 


7/28/17 22:55 36.8 74 16 145/78 (100) 96 Room Air  


 


7/28/17 19:00 36.8 65 16 150/73 (98) 95 Nasal Cannula 2.0 


 


7/28/17 17:55 36.9 74 16 154/70 (98) 95  2.0 


 


7/28/17 16:55 36.5 75 16 148/74 (98) 94 Nasal Cannula 2.0 


 


7/28/17 16:25 36.5 84 18 148/77 (100) 94 Nasal Cannula 2.0 


 


7/28/17 15:55 36.5 82 18 143/77 (99) 96 Nasal Cannula 2.0 


 


7/28/17 15:20     96 Nasal Cannula 2.0 


 


7/28/17 15:15  80 20 165/84 (111) 96 Nasal Cannula 3 


 


7/28/17 15:00  81 20 166/80 (108) 96 Nasal Cannula 3 


 


7/28/17 14:50  82 20 168/82 (110) 95 Nasal Cannula 3 


 


7/28/17 14:40  80 20 156/81 (106) 96 Nasal Cannula 3 


 


7/28/17 12:24 37.1 84 20 159/93 (115) 94 Nasal Cannula 3 


 


7/28/17 11:58 37.4 81 16 163/81 95 Nasal Cannula 2.0 








 (Sarah Langston PA-C)





Physical Exam


Notes:


General: awake, alert, NAD, uncomfortable


Head: Normocephalic, atraumatic


ENT: PERRL, EOMI, no pharyngeal exudate, mucous membranes moist


Chest: Clear to auscultation, on 2L via NC, no adventitious breath sounds


Cardiac: Regular rate and rhythm, no murmur, no JVD, normal peripheral pulses, 

good capillary refill


Abdominal: + Distention severe, active bowel sounds in 4 quad, + tympany on 

percussion,  + discomfort with palpation by pt denies pain,  no rebound, 

guarding or tenderness


Extremities: + abrasion over R shin, otherwise normal inspection, no peripheral 

edema or erythema, calfs nontender to palpation


Psych: Normal mood and affect


Neuro: AAO x 3,  speech is clear, no peripheral sensory deficits


 (Sarah Langston PA-C)





Laboratory Results





Last 24 Hours








Test


  7/28/17


11:49 7/28/17


16:59 7/28/17


21:03 7/28/17


23:32


 


Bedside Glucose 138 mg/dl  135 mg/dl  94 mg/dl  97 mg/dl 


 


Test


  7/29/17


05:40 7/29/17


07:14 


  


 


 


Bedside Glucose 111 mg/dl    


 


Sodium Level  139 mmol/L   


 


Potassium Level  3.1 mmol/L   


 


Chloride Level  105 mmol/L   


 


Carbon Dioxide Level  28 mmol/L   


 


Anion Gap  6.0 mmol/L   


 


Blood Urea Nitrogen  12 mg/dl   


 


Creatinine  0.95 mg/dl   


 


Est Creatinine Clear Calc


Drug Dose 


  83.0 ml/min 


  


  


 


 


Estimated GFR (


American) 


  90.4 


  


  


 


 


Estimated GFR (Non-


American 


  78.0 


  


  


 


 


BUN/Creatinine Ratio  12.9   


 


Random Glucose  109 mg/dl   


 


Calcium Level  7.9 mg/dl   


 


Magnesium Level  2.1 mg/dl   








 (Filipowicz,Sarah G., PA-C)





Assessment and Plan


This is a 77 yo M with PMHx of T2DM with Diabetic Neuropathy, HTN, Gout, GIULIA on 

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 

anxiety disorder who presents with abdominal pain and bloating since Tuesday. 





Sigmoid Volvulus


- Vitals are stable, active bowel sounds x 4 quad but still severely distended, 

not acutely nauseous or vomiting this morning. 


- Pt underwent decompression via colonoscopy by GI on 7/28 which was partially 

resolved.  General Surgery including Tanmay and Dr. Payan  are both aware 

of the patient with Ora on call. 


- Spoke with Dr. Kent at bedside who is planning to take the patient to 

emergent decompression and placement for rectal tube. He is recommending a 

transfer to tertiary care center if there are no plans for surgical 

intervention here.  


- started on cipro and flagyl 7/28 for concerns of gastritis seen on 

colonoscopy and GI recommending this x 24 hours before any intervention. 


- CT 7/27 reviewed showing sigmoid volvulus and appears to be 8 cm at the 

largest point


- Hx of  diabetic gastroparesis 


- Antiemetic with IV zofran prn


- PT/OT after decompression





T2DM with Diabetic Neuropathy: A1c 5.9 (2/22/17)


- Pt was just taken off metformin by his PCP on Tuesday, Last Hgb A1C was 5.9 

in Feb 2017 be our records. 


- ISS with Accu-Cheks ACHS





Hypokalemia


- K+ replaced 40 meq IV 





Restless leg syndrome


- Gabapentin 300 mg QID once PO intake allowed


- Continue Carbidopa levodopa  mg TID





HTN:


- Cont asa 81 mg daily once PO intake allowed





Gout:


- Allopurinol 300 mg daily once PO intake allowed





GIULIA:


- Will order CPAP tonight as he lives in [place], allow ativan IV prn for 

claustrophobia with cpap and anxiety with hospital stay. 


- clonazepam held due to holding PO meds


- Continue sertraline 25 mg daily when able to take PO





DVT Prophylaxis: Heparin subcutaneous, teds, scds





Code Status: FULL RESUSCITATION





Disposition: Patient from home, undergoing decompression this morning by Dr. Kent, may need surgical decompression


 (Sarah Langston, EMMANUEL)


PA Physician Supervision Note:





I interviewed and examined the patient. Discussed with Sarah Langston PAC 

and agree with findings and plan as documented in the note. Any exceptions or 

clarifications are listed here: None





Patient presented with distended abdomen, has a history of a hemicolectomy for 

benign tumor, CT scan of the ER is consistent with sigmoid volvulus.  

Gastroenterology performed decompression colonoscopy on July 28.  morning of 7/ 29, re distension and return to endoscopy suit, re decompressed and rectal tube 

placed, if reaccumulation will refer to surgery


Patient remains with distended abdomen, mild discomfort less than expected for 

amount of distension at bedside and on x ray


Vital signs are stable


Heart is regular lungs are clear


Abdomen is  distended, tympany,  hypoactive bowel sounds





Sigmoid volvulus, decompressive colonoscopy x2 if not improved, surgery to 

follow





Documented By:  Jean Betnacourt


 (Jean Betancourt M.D.)

## 2017-07-29 NOTE — DIAGNOSTIC IMAGING REPORT
KUB



CLINICAL HISTORY: Rectal tube placed.    



COMPARISON STUDY:  Abdominal series July 29, 2017 at 8:39 AM. 



FINDINGS: Note is made of interval placement of a rectal tube. The tube tip is

within the left upper quadrant, likely within the splenic flexure of the colon.

Colonic distention shown on prior exam has resolved. Mild small bowel dilatation

has improved.



IMPRESSION: Interval resolution of colonic distention and improvement in small

bowel dilatation following placement of a rectal tube. Tube tip within the

splenic flexure of the colon.







Electronically signed by:  Roge Diaz M.D.

7/29/2017 11:07 AM



Dictated Date/Time:  7/29/2017 11:05 AM

## 2017-07-29 NOTE — GASTROENTEROLOGY PROGRESS NOTE
Progress Note


Date of Service:  Jul 29, 2017


Subjective


Pt evaluation today including:  conversation w/ patient, physical exam


The patient presented about 36 hours ago with colonic distention and underwent 

a decompression with Dr. Xiong on Friday.  Initially the patient felt better but 

he's noted overnight that he's had worsening abdominal distention and unable to 

pass any gas.  Abdominal x-ray from this morning seems to show marked dilation 

of the colon to 12 cm.





Review of Systems


Constitutional:  + problem reported, No fever, No sweats


Cardiac:  No chest pain, No palpitations





Medications





Current Inpatient Medications








 Medications


  (Trade)  Dose


 Ordered  Sig/Kalpesh


 Route  Start Time


 Stop Time Status Last Admin


Dose Admin


 


 Ioversol


  (Optiray 320)  125 ml  UD  PRN


 IV  7/27/17 11:45


 7/31/17 11:44   


 


 


 Carbidopa/Levodopa


  (Sinemet 25/


 100MG Tab)  1 tab  TID


 PO  7/27/17 21:00


 8/26/17 20:59  7/29/17 09:25


1 TAB


 


 Lorazepam


  (Ativan Inj)  0.5 mg  Q4H  PRN


 IV  7/27/17 16:00


 8/26/17 15:59   


 


 


 Lorazepam


  (Ativan Inj)  1 mg  Q4H  PRN


 IV  7/27/17 16:00


 8/26/17 15:59   


 


 


 Promethazine HCl


 12.5 mg/Sodium


 Chloride  50.5 ml @ 


 204 mls/hr  Q6H  PRN


 IV  7/27/17 16:00


 8/26/17 15:59   


 


 


 Morphine Sulfate


  (MoRPHine


 SULFATE INJ)  2 mg  Q4H  PRN


 IV  7/27/17 16:00


 8/10/17 15:59  7/27/17 19:24


2 MG


 


 Morphine Sulfate


  (MoRPHine


 SULFATE INJ)  4 mg  Q4H  PRN


 IV  7/27/17 16:00


 8/10/17 15:59   


 


 


 Ondansetron HCl


  (Zofran Inj)  4 mg  Q6H  PRN


 IV  7/27/17 16:00


 8/26/17 15:59  7/28/17 05:47


4 MG


 


 Ranitidine HCl 50


 mg/Dextrose  102 ml @ 


 200 mls/hr  Q8H


 IV  7/27/17 18:00


 8/26/17 17:59  7/29/17 02:29


200 MLS/HR


 


 Lorazepam 1 mg/


 Syringe  1 ml @ 1


 mls/min  Q4H  PRN


 IV  7/27/17 17:30


 8/26/17 17:29   


 


 


 Lorazepam 0.5 mg/


 Syringe  1 ml @ 1


 mls/min  Q4H  PRN


 IV  7/27/17 17:30


 8/26/17 17:29   


 


 


 Insulin Aspart


  (novoLOG ASPART)  SLIDING


 SCALE


 PARAMETER  Q6


 SC  7/28/17 06:00


 8/27/17 05:59   


 


 


 Ciprofloxacin/


 Dextrose 400 mg/


 Prmx  200 ml @ 


 100 mls/hr  Q12H


 IV  7/28/17 16:00


 8/7/17 20:59  7/29/17 03:48


100 MLS/HR


 


 Metronidazole 500


 mg/Prmx  100 ml @ 


 100 mls/hr  Q8H


 IV  7/28/17 18:00


 8/7/17 17:59  7/29/17 02:29


100 MLS/HR


 


 Potassium


 Chloride/Sodium


 Chloride  1,000 ml @ 


 100 mls/hr  Q10H


 IV  7/28/17 18:00


 8/27/17 17:59  7/29/17 03:48


100 MLS/HR


 


 Potassium


 Chloride 10 meq/


 Prmx  100 ml @ 


 100 mls/hr  Q1H


 IV  7/29/17 08:30


 7/29/17 12:29  7/29/17 09:25


100 MLS/HR











Objective


Vital Signs











  Date Time  Temp Pulse Resp B/P (MAP) Pulse Ox O2 Delivery O2 Flow Rate FiO2


 


7/29/17 09:39      Room Air  


 


7/29/17 07:26 37.0 62 16 158/69 (98) 92 Room Air  


 


7/28/17 23:20      Room Air  


 


7/28/17 22:55 36.8 74 16 145/78 (100) 96 Room Air  


 


7/28/17 19:00 36.8 65 16 150/73 (98) 95 Nasal Cannula 2.0 


 


7/28/17 17:55 36.9 74 16 154/70 (98) 95  2.0 


 


7/28/17 16:55 36.5 75 16 148/74 (98) 94 Nasal Cannula 2.0 


 


7/28/17 16:25 36.5 84 18 148/77 (100) 94 Nasal Cannula 2.0 


 


7/28/17 15:55 36.5 82 18 143/77 (99) 96 Nasal Cannula 2.0 


 


7/28/17 15:20     96 Nasal Cannula 2.0 


 


7/28/17 15:15  80 20 165/84 (111) 96 Nasal Cannula 3 


 


7/28/17 15:00  81 20 166/80 (108) 96 Nasal Cannula 3 


 


7/28/17 14:50  82 20 168/82 (110) 95 Nasal Cannula 3 


 


7/28/17 14:40  80 20 156/81 (106) 96 Nasal Cannula 3 


 


7/28/17 12:24 37.1 84 20 159/93 (115) 94 Nasal Cannula 3 


 


7/28/17 11:58 37.4 81 16 163/81 95 Nasal Cannula 2.0 











Physical Exam


General Appearance:  + mild distress


Eyes:  PERRL


Neck:  no JVD


Respiratory/Chest:  lungs clear


Cardiovascular:  no murmur


Abdomen:  + pertinent finding (diffuse distention of the abdomen with tympany 

and mild tenderness)


Neurologic/Psych:  oriented x 3


Skin:  no jaundice


Patient with a history of sigmoid volvulus with what appears to be recurrence.  

Given the dilation of the colon we can proceed with a repeat colonic 

decompression with placement of a rectal tube this time.  Given the nature of 

his symptoms and recurrence requested general surgery to determine if he is a 

surgical candidate for sigmoid colectomy at this hospital.  If not I would 

suggest that the patient be transferred to a referral center for further 

evaluation and treatment.





Plan


Colonic decompression with rectal tube placement


Please discuss case with general surgery to determine if patient should stay at 

this hospital or be referred to a tertiary center


Nothing by mouth





Laboratory Results





Last 24 Hours








Test


  7/28/17


11:49 7/28/17


16:59 7/28/17


21:03 7/28/17


23:32


 


Bedside Glucose 138 mg/dl  135 mg/dl  94 mg/dl  97 mg/dl 


 


Test


  7/29/17


05:40 7/29/17


07:14 


  


 


 


Bedside Glucose 111 mg/dl    


 


Sodium Level  139 mmol/L   


 


Potassium Level  3.1 mmol/L   


 


Chloride Level  105 mmol/L   


 


Carbon Dioxide Level  28 mmol/L   


 


Anion Gap  6.0 mmol/L   


 


Blood Urea Nitrogen  12 mg/dl   


 


Creatinine  0.95 mg/dl   


 


Est Creatinine Clear Calc


Drug Dose 


  83.0 ml/min 


  


  


 


 


Estimated GFR (


American) 


  90.4 


  


  


 


 


Estimated GFR (Non-


American 


  78.0 


  


  


 


 


BUN/Creatinine Ratio  12.9   


 


Random Glucose  109 mg/dl   


 


Lactic Acid Level  1.1 mmol/L   


 


Calcium Level  7.9 mg/dl   


 


Magnesium Level  2.1 mg/dl

## 2017-07-29 NOTE — ANESTHESIOLOGY PROGRESS NOTE
Anesthesia Post Op Note


Date & Time


Jul 29, 2017 at 11:06





Vital Signs


Pain Intensity:  0





Vital Signs Past 12 Hours








  Date Time  Temp Pulse Resp B/P (MAP) Pulse Ox O2 Delivery O2 Flow Rate FiO2


 


7/29/17 10:50  61 16 140/68 99 Nasal Cannula 3 


 


7/29/17 10:42 37.2 60 14 135/69 99 Nasal Cannula 3 


 


7/29/17 09:39      Room Air  


 


7/29/17 07:26 37.0 62 16 158/69 (98) 92 Room Air  


 


7/28/17 23:20      Room Air  











Notes


Mental Status:  alert / awake / arousable, participated in evaluation


Pt Amnestic to Procedure:  Yes


Nausea / Vomiting:  adequately controlled


Pain:  adequately controlled


Airway Patency, RR, SpO2:  stable & adequate


BP & HR:  stable & adequate


Hydration State:  stable & adequate


Anesthetic Complications:  no major complications apparent

## 2017-07-30 VITALS
HEART RATE: 55 BPM | OXYGEN SATURATION: 94 % | DIASTOLIC BLOOD PRESSURE: 74 MMHG | TEMPERATURE: 98.78 F | SYSTOLIC BLOOD PRESSURE: 168 MMHG

## 2017-07-30 VITALS
HEART RATE: 61 BPM | SYSTOLIC BLOOD PRESSURE: 159 MMHG | OXYGEN SATURATION: 92 % | DIASTOLIC BLOOD PRESSURE: 70 MMHG | TEMPERATURE: 98.96 F

## 2017-07-30 VITALS
TEMPERATURE: 98.78 F | SYSTOLIC BLOOD PRESSURE: 163 MMHG | DIASTOLIC BLOOD PRESSURE: 75 MMHG | HEART RATE: 62 BPM | OXYGEN SATURATION: 93 %

## 2017-07-30 VITALS
TEMPERATURE: 98.96 F | HEART RATE: 64 BPM | SYSTOLIC BLOOD PRESSURE: 177 MMHG | DIASTOLIC BLOOD PRESSURE: 76 MMHG | OXYGEN SATURATION: 95 %

## 2017-07-30 VITALS — HEART RATE: 59 BPM | OXYGEN SATURATION: 93 %

## 2017-07-30 VITALS — SYSTOLIC BLOOD PRESSURE: 175 MMHG | DIASTOLIC BLOOD PRESSURE: 81 MMHG

## 2017-07-30 VITALS — HEART RATE: 60 BPM | OXYGEN SATURATION: 96 %

## 2017-07-30 VITALS — OXYGEN SATURATION: 93 % | HEART RATE: 68 BPM

## 2017-07-30 LAB
ANION GAP SERPL CALC-SCNC: 7 MMOL/L (ref 3–11)
BUN SERPL-MCNC: 8 MG/DL (ref 7–18)
BUN/CREAT SERPL: 8 (ref 10–20)
CALCIUM SERPL-MCNC: 7.9 MG/DL (ref 8.5–10.1)
CHLORIDE SERPL-SCNC: 105 MMOL/L (ref 98–107)
CO2 SERPL-SCNC: 28 MMOL/L (ref 21–32)
CREAT CL PREDICTED SERPL C-G-VRATE: 78.8 ML/MIN
CREAT SERPL-MCNC: 1 MG/DL (ref 0.6–1.4)
EOSINOPHIL NFR BLD AUTO: 206 K/UL (ref 130–400)
GLUCOSE SERPL-MCNC: 108 MG/DL (ref 70–99)
HCT VFR BLD CALC: 40.5 % (ref 42–52)
MCH RBC QN AUTO: 29.9 PG (ref 25–34)
MCHC RBC AUTO-ENTMCNC: 31.9 G/DL (ref 32–36)
MCV RBC AUTO: 94 FL (ref 80–100)
PMV BLD AUTO: 10 FL (ref 7.4–10.4)
POTASSIUM SERPL-SCNC: 3.3 MMOL/L (ref 3.5–5.1)
RBC # BLD AUTO: 4.31 M/UL (ref 4.7–6.1)
SODIUM SERPL-SCNC: 140 MMOL/L (ref 136–145)
WBC # BLD AUTO: 8.28 K/UL (ref 4.8–10.8)

## 2017-07-30 RX ADMIN — POLYETHYLENE GLYCOL 3350, SODIUM SULFATE ANHYDROUS, SODIUM BICARBONATE, SODIUM CHLORIDE, POTASSIUM CHLORIDE SCH DOSE: 236; 22.74; 6.74; 5.86; 2.97 POWDER, FOR SOLUTION ORAL at 11:15

## 2017-07-30 RX ADMIN — INSULIN ASPART SCH UNITS: 100 INJECTION, SOLUTION INTRAVENOUS; SUBCUTANEOUS at 18:00

## 2017-07-30 RX ADMIN — RANITIDINE HYDROCHLORIDE SCH MLS/HR: 25 INJECTION, SOLUTION INTRAMUSCULAR; INTRAVENOUS at 18:08

## 2017-07-30 RX ADMIN — POLYETHYLENE GLYCOL 3350, SODIUM SULFATE ANHYDROUS, SODIUM BICARBONATE, SODIUM CHLORIDE, POTASSIUM CHLORIDE SCH DOSE: 236; 22.74; 6.74; 5.86; 2.97 POWDER, FOR SOLUTION ORAL at 03:35

## 2017-07-30 RX ADMIN — METRONIDAZOLE SCH MLS/HR: 500 INJECTION, SOLUTION INTRAVENOUS at 02:30

## 2017-07-30 RX ADMIN — CIPROFLOXACIN SCH MLS/HR: 2 INJECTION, SOLUTION INTRAVENOUS at 03:34

## 2017-07-30 RX ADMIN — POTASSIUM CHLORIDE SCH MLS/HR: 10 INJECTION, SOLUTION INTRAVENOUS at 10:01

## 2017-07-30 RX ADMIN — RANITIDINE HYDROCHLORIDE SCH MLS/HR: 25 INJECTION, SOLUTION INTRAMUSCULAR; INTRAVENOUS at 02:31

## 2017-07-30 RX ADMIN — IPRATROPIUM BROMIDE AND ALBUTEROL SULFATE SCH ML: .5; 3 SOLUTION RESPIRATORY (INHALATION) at 11:30

## 2017-07-30 RX ADMIN — INSULIN ASPART SCH UNITS: 100 INJECTION, SOLUTION INTRAVENOUS; SUBCUTANEOUS at 05:53

## 2017-07-30 RX ADMIN — RANITIDINE HYDROCHLORIDE SCH MLS/HR: 25 INJECTION, SOLUTION INTRAMUSCULAR; INTRAVENOUS at 10:01

## 2017-07-30 RX ADMIN — POLYETHYLENE GLYCOL 3350, SODIUM SULFATE ANHYDROUS, SODIUM BICARBONATE, SODIUM CHLORIDE, POTASSIUM CHLORIDE SCH DOSE: 236; 22.74; 6.74; 5.86; 2.97 POWDER, FOR SOLUTION ORAL at 20:57

## 2017-07-30 RX ADMIN — POLYETHYLENE GLYCOL 3350, SODIUM SULFATE ANHYDROUS, SODIUM BICARBONATE, SODIUM CHLORIDE, POTASSIUM CHLORIDE SCH DOSE: 236; 22.74; 6.74; 5.86; 2.97 POWDER, FOR SOLUTION ORAL at 15:58

## 2017-07-30 RX ADMIN — CARBIDOPA AND LEVODOPA SCH TAB: 25; 100 TABLET ORAL at 13:48

## 2017-07-30 RX ADMIN — CARBIDOPA AND LEVODOPA SCH TAB: 25; 100 TABLET ORAL at 08:42

## 2017-07-30 RX ADMIN — POLYETHYLENE GLYCOL 3350, SODIUM SULFATE ANHYDROUS, SODIUM BICARBONATE, SODIUM CHLORIDE, POTASSIUM CHLORIDE SCH DOSE: 236; 22.74; 6.74; 5.86; 2.97 POWDER, FOR SOLUTION ORAL at 07:31

## 2017-07-30 RX ADMIN — POTASSIUM CHLORIDE SCH MLS/HR: 10 INJECTION, SOLUTION INTRAVENOUS at 11:15

## 2017-07-30 RX ADMIN — IPRATROPIUM BROMIDE AND ALBUTEROL SULFATE SCH ML: .5; 3 SOLUTION RESPIRATORY (INHALATION) at 19:18

## 2017-07-30 RX ADMIN — POTASSIUM CHLORIDE SCH MLS/HR: 10 INJECTION, SOLUTION INTRAVENOUS at 08:33

## 2017-07-30 RX ADMIN — CIPROFLOXACIN SCH MLS/HR: 2 INJECTION, SOLUTION INTRAVENOUS at 15:58

## 2017-07-30 RX ADMIN — METRONIDAZOLE SCH MLS/HR: 500 INJECTION, SOLUTION INTRAVENOUS at 08:44

## 2017-07-30 RX ADMIN — INSULIN ASPART SCH UNITS: 100 INJECTION, SOLUTION INTRAVENOUS; SUBCUTANEOUS at 11:15

## 2017-07-30 RX ADMIN — IPRATROPIUM BROMIDE AND ALBUTEROL SULFATE SCH ML: .5; 3 SOLUTION RESPIRATORY (INHALATION) at 15:45

## 2017-07-30 RX ADMIN — CARBIDOPA AND LEVODOPA SCH TAB: 25; 100 TABLET ORAL at 20:58

## 2017-07-30 RX ADMIN — METRONIDAZOLE SCH MLS/HR: 500 INJECTION, SOLUTION INTRAVENOUS at 18:08

## 2017-07-30 NOTE — GASTROENTEROLOGY PROGRESS NOTE
Progress Note


Date of Service:  Jul 30, 2017


Subjective


Pt evaluation today including:  conversation w/ patient, physical exam


The patient notes that his abdomen feels less tender than prior days however he 

still feels distended and is a little bit labored with his breathing this 

morning.  He does note having some water passed from his rectal area and is 

unsure if he is passing any gas





Review of Systems


Constitutional:  No fever, No sweats


Respiratory:  + see HPI, No cough, No wheezing


Cardiac:  + problem reported, No chest pain, No PND, No palpitations





Medications





Current Inpatient Medications








 Medications


  (Trade)  Dose


 Ordered  Sig/Kalpesh


 Route  Start Time


 Stop Time Status Last Admin


Dose Admin


 


 Ioversol


  (Optiray 320)  125 ml  UD  PRN


 IV  7/27/17 11:45


 7/31/17 11:44   


 


 


 Carbidopa/Levodopa


  (Sinemet 25/


 100MG Tab)  1 tab  TID


 PO  7/27/17 21:00


 8/26/17 20:59  7/30/17 08:42


1 TAB


 


 Lorazepam


  (Ativan Inj)  0.5 mg  Q4H  PRN


 IV  7/27/17 16:00


 8/26/17 15:59   


 


 


 Lorazepam


  (Ativan Inj)  1 mg  Q4H  PRN


 IV  7/27/17 16:00


 8/26/17 15:59   


 


 


 Promethazine HCl


 12.5 mg/Sodium


 Chloride  50.5 ml @ 


 204 mls/hr  Q6H  PRN


 IV  7/27/17 16:00


 8/26/17 15:59   


 


 


 Morphine Sulfate


  (MoRPHine


 SULFATE INJ)  2 mg  Q4H  PRN


 IV  7/27/17 16:00


 8/10/17 15:59  7/27/17 19:24


2 MG


 


 Morphine Sulfate


  (MoRPHine


 SULFATE INJ)  4 mg  Q4H  PRN


 IV  7/27/17 16:00


 8/10/17 15:59   


 


 


 Ondansetron HCl


  (Zofran Inj)  4 mg  Q6H  PRN


 IV  7/27/17 16:00


 8/26/17 15:59  7/28/17 05:47


4 MG


 


 Ranitidine HCl 50


 mg/Dextrose  102 ml @ 


 200 mls/hr  Q8H


 IV  7/27/17 18:00


 8/26/17 17:59  7/30/17 02:31


200 MLS/HR


 


 Insulin Aspart


  (novoLOG ASPART)  SLIDING


 SCALE


 PARAMETER  Q6


 SC  7/28/17 06:00


 8/27/17 05:59   


 


 


 Ciprofloxacin/


 Dextrose 400 mg/


 Prmx  200 ml @ 


 100 mls/hr  Q12H


 IV  7/28/17 16:00


 8/7/17 20:59  7/30/17 03:34


100 MLS/HR


 


 Metronidazole 500


 mg/Prmx  100 ml @ 


 100 mls/hr  Q8H


 IV  7/28/17 18:00


 8/7/17 17:59  7/30/17 08:44


100 MLS/HR


 


 Polyethylene


 Glycol/


 Electrolytes


  (Golytely Soln)  0.4 dose  Q4H


 FL  7/29/17 12:00


 8/28/17 11:59  7/30/17 07:31


0.4 DOSE


 


 Lorazepam 0.5 mg/


 Syringe  1 ml @ 1


 mls/min  Q4H  PRN


 IV  7/29/17 21:30


 8/28/17 21:29   


 


 


 Lorazepam 1 mg/


 Syringe  1 ml @ 1


 mls/min  Q4H  PRN


 IV  7/29/17 21:30


 8/28/17 21:29  7/29/17 23:42


1 MLS/MIN


 


 Potassium


 Chloride 10 meq/


 Prmx  100 ml @ 


 100 mls/hr  Q1H


 IV  7/30/17 08:00


 7/30/17 10:59  7/30/17 08:33


100 MLS/HR


 


 Albuterol/


 Ipratropium


  (Duoneb)  3 ml  QIDR


 INH  7/30/17 12:00


 8/29/17 11:59   


 


 


 Albuterol/


 Ipratropium


  (Duoneb)  3 ml  Q2R  PRN


 INH  7/30/17 08:15


 8/29/17 08:14   


 











Objective


Vital Signs











  Date Time  Temp Pulse Resp B/P (MAP) Pulse Ox O2 Delivery O2 Flow Rate FiO2


 


7/30/17 07:06 37.1 55 19 168/74 (105) 94 Room Air  


 


7/30/17 03:04    175/81 (112)    


 


7/29/17 23:40      CPAP  


 


7/29/17 22:52 36.9 62 24 177/81 (113) 93 Room Air  


 


7/29/17 15:31 37.0 56 18 160/69 (99) 92 Room Air  


 


7/29/17 15:15      Room Air  


 


7/29/17 11:27 37.2 68 16 165/74 (104) 94 Nasal Cannula 2.0 


 


7/29/17 11:05 37 60 16 138/73 98 Nasal Cannula 3 


 


7/29/17 10:50  61 16 140/68 99 Nasal Cannula 3 


 


7/29/17 10:42 37.2 60 14 135/69 99 Nasal Cannula 3 


 


7/29/17 09:39      Room Air  











Physical Exam


General Appearance:  + mild distress


Eyes:  PERRL


Neck:  no JVD


Respiratory/Chest:  lungs clear


Cardiovascular:  no JVD


Abdomen:  + distended, + pertinent finding (hypoactive bowel sounds)


Extremities:  no pedal edema


Neurologic/Psych:  oriented x 3


Skin:  no jaundice





Laboratory Results





Last 24 Hours








Test


  7/29/17


12:20 7/29/17


16:56 7/29/17


23:32 7/30/17


05:38


 


Bedside Glucose 101 mg/dl  99 mg/dl  101 mg/dl  109 mg/dl 


 


Test


  7/30/17


05:57 


  


  


 


 


White Blood Count 8.28 K/uL    


 


Red Blood Count 4.31 M/uL    


 


Hemoglobin 12.9 g/dL    


 


Hematocrit 40.5 %    


 


Mean Corpuscular Volume 94.0 fL    


 


Mean Corpuscular Hemoglobin 29.9 pg    


 


Mean Corpuscular Hemoglobin


Concent 31.9 g/dl 


  


  


  


 


 


RDW Standard Deviation 46.0 fL    


 


RDW Coefficient of Variation 13.3 %    


 


Platelet Count 206 K/uL    


 


Mean Platelet Volume 10.0 fL    


 


Sodium Level 140 mmol/L    


 


Potassium Level 3.3 mmol/L    


 


Chloride Level 105 mmol/L    


 


Carbon Dioxide Level 28 mmol/L    


 


Anion Gap 7.0 mmol/L    


 


Blood Urea Nitrogen 8 mg/dl    


 


Creatinine 1.00 mg/dl    


 


Est Creatinine Clear Calc


Drug Dose 78.8 ml/min 


  


  


  


 


 


Estimated GFR (


American) 85.0 


  


  


  


 


 


Estimated GFR (Non-


American 73.3 


  


  


  


 


 


BUN/Creatinine Ratio 8.0    


 


Random Glucose 108 mg/dl    


 


Calcium Level 7.9 mg/dl    











Assessment and Plan


Patient presented several days ago with sigmoid volvulus under going two 

colonic decompressions in the last 48 hours.  We reviewed this mornings films 

with Radiology who feels that todays film is stable/maybe improved.  I wonder 

if he should undergo surgical therapy for his presumed sigmoid volvulus with 

his regular surgeon early next week.





Recommendations


Continue with nothing by mouth


Avoid use of narcotics and benzodiazepines


Daily KUB


Continue with Golyte 100 ml via rectal tube every 4 hours


If abdominal film worsening patient may need urgent surgical intervention

## 2017-07-30 NOTE — DIAGNOSTIC IMAGING REPORT
KUB



CLINICAL HISTORY: s/p rectal tube placement, eval for colonic distension    



COMPARISON STUDY:  7/29/2017 



FINDINGS: Rectal tube remains with the tip in the splenic flexure of the colon.

No significant colonic distention. Mild small bowel distention unchanged.

Air-filled stomach.



IMPRESSION:  

1.. No significant colonic distention.

2. Moderate stable small bowel distention.

3. Rectal tube remains with tip in the splenic flexure of the colon. 













The above report was generated using voice recognition software.  It may contain

grammatical, syntax or spelling errors.







Electronically signed by:  Gerald Carrillo M.D.

7/30/2017 9:55 AM



Dictated Date/Time:  7/30/2017 9:54 AM

## 2017-07-30 NOTE — SURGERY PROGRESS NOTE
Surgery Progress Note


Date of Service


Jul 30, 2017.





Subjective


+ bowel movement (via rectal tube), No nausea, No vomiting


Denies pain





Objective


Vital Signs:











  Date Time  Temp Pulse Resp B/P (MAP) Pulse Ox O2 Delivery O2 Flow Rate FiO2


 


7/30/17 11:20  60 16  96 Room Air  


 


7/30/17 11:00 37.2 61 20 159/70 (99) 92 Room Air  


 


7/30/17 07:20      Nasal Cannula  


 


7/30/17 07:06 37.1 55 19 168/74 (105) 94 Room Air  


 


7/30/17 03:04    175/81 (112)    


 


7/29/17 23:40      CPAP  


 


7/29/17 22:52 36.9 62 24 177/81 (113) 93 Room Air  


 


7/29/17 15:31 37.0 56 18 160/69 (99) 92 Room Air  


 


7/29/17 15:15      Room Air  








Abdomen:  normal bowel sounds, non tender, + distended (slightly more today)


Laboratory Results:





Results Past 24 Hours








Test


  7/29/17


16:56 7/29/17


23:32 7/30/17


05:38 7/30/17


05:57 Range/Units


 


 


Bedside Glucose 99 101 109  70-99  mg/dl


 


White Blood Count    8.28 4.8-10.8  K/uL


 


Red Blood Count    4.31 4.7-6.1  M/uL


 


Hemoglobin    12.9 14.0-18.0  g/dL


 


Hematocrit    40.5 42-52  %


 


Mean Corpuscular Volume    94.0   fL


 


Mean Corpuscular Hemoglobin    29.9 25-34  pg


 


Mean Corpuscular Hemoglobin


Concent 


  


  


  31.9


  32-36  g/dl


 


 


RDW Standard Deviation    46.0 36.4-46.3  fL


 


RDW Coefficient of Variation    13.3 11.5-14.5  %


 


Platelet Count    206 130-400  K/uL


 


Mean Platelet Volume    10.0 7.4-10.4  fL


 


Sodium Level    140 136-145  mmol/L


 


Potassium Level    3.3 3.5-5.1  mmol/L


 


Chloride Level    105   mmol/L


 


Carbon Dioxide Level    28 21-32  mmol/L


 


Anion Gap    7.0 3-11  mmol/L


 


Blood Urea Nitrogen    8 7-18  mg/dl


 


Creatinine


  


  


  


  1.00


  0.60-1.40


mg/dl


 


Est Creatinine Clear Calc


Drug Dose 


  


  


  78.8


   ml/min


 


 


Estimated GFR (


American) 


  


  


  85.0


   


 


 


Estimated GFR (Non-


American 


  


  


  73.3


   


 


 


BUN/Creatinine Ratio    8.0 10-20  


 


Random Glucose    108 70-99  mg/dl


 


Calcium Level    7.9 8.5-10.1  mg/dl


 


Test


  7/30/17


11:50 


  


  


  Range/Units


 


 


Bedside Glucose 96    70-99  mg/dl








Diagnostic Interpretation:


[~ rep ct add3]]


KUB





CLINICAL HISTORY: s/p rectal tube placement, eval for colonic distension    





COMPARISON STUDY:  7/29/2017 





FINDINGS: Rectal tube remains with the tip in the splenic flexure of the colon.


No significant colonic distention. Mild small bowel distention unchanged.


Air-filled stomach.





IMPRESSION:  


1.. No significant colonic distention.


2. Moderate stable small bowel distention.


3. Rectal tube remains with tip in the splenic flexure of the colon.





Assessment & Plan


Sigmoid volvulus


Now decompressed.  Xray today unchanged


I agree with continuing the Colyte in attempt to prep the bowel as much as 

possible.


There was no need for immediate surgical intervention.


WBC normal


If the bowel can be prepped it would make it more likely that a primary 

anastomosis would be able to be performed and would be successful.


Continue NPO

## 2017-07-30 NOTE — PROGRESS NOTE
Subjective


Date of Service:


Jul 30, 2017.


Subjective


pt has persistent clinical abdominal distension and tympany with hyperactive 

bowel sounds, is comfortable with little pain





KUB 7/30 not worsened





PT is slightly tachypneic at baseline today with prolonged expiration and 

tobacco history





Problem List


Medical Problems:


(1) Rhabdomyolysis


Status: Acute  





(2) Sigmoid volvulus


Status: Acute  





(3) Weakness


Status: Acute  











Review of Systems


Constitutional:  No fever, No chills


Respiratory:  + shortness of breath, + dyspnea at rest, No cough


Cardiac:  No chest pain, No orthopnea, No PND


Abdomen:  No pain, No nausea


Male :  No dysuria, No urinary frequency


Psychiatric:  No depression symptoms, No anhedonism





Objective


Vital Signs











  Date Time  Temp Pulse Resp B/P (MAP) Pulse Ox O2 Delivery O2 Flow Rate FiO2


 


7/30/17 11:20  60 16  96 Room Air  


 


7/30/17 11:00 37.2 61 20 159/70 (99) 92 Room Air  


 


7/30/17 07:20      Nasal Cannula  


 


7/30/17 07:06 37.1 55 19 168/74 (105) 94 Room Air  


 


7/30/17 03:04    175/81 (112)    


 


7/29/17 23:40      CPAP  


 


7/29/17 22:52 36.9 62 24 177/81 (113) 93 Room Air  


 


7/29/17 15:31 37.0 56 18 160/69 (99) 92 Room Air  


 


7/29/17 15:15      Room Air  











Physical Exam


General Appearance:  WD/WN, + mild distress


Neck:  supple, no JVD


Respiratory/Chest:  chest non-tender, lungs clear, + respiratory distress, + 

decreased breath sounds


Cardiovascular:  regular rate, rhythm, no murmur


Abdomen:  + abnormal bowel sounds, + distended, + tenderness


Extremities:  no pedal edema, no calf tenderness


Neurologic/Psychiatric:  alert, oriented x 3





Laboratory Results





Last 24 Hours








Test


  7/29/17


16:56 7/29/17


23:32 7/30/17


05:38 7/30/17


05:57


 


Bedside Glucose 99 mg/dl  101 mg/dl  109 mg/dl  


 


White Blood Count    8.28 K/uL 


 


Red Blood Count    4.31 M/uL 


 


Hemoglobin    12.9 g/dL 


 


Hematocrit    40.5 % 


 


Mean Corpuscular Volume    94.0 fL 


 


Mean Corpuscular Hemoglobin    29.9 pg 


 


Mean Corpuscular Hemoglobin


Concent 


  


  


  31.9 g/dl 


 


 


RDW Standard Deviation    46.0 fL 


 


RDW Coefficient of Variation    13.3 % 


 


Platelet Count    206 K/uL 


 


Mean Platelet Volume    10.0 fL 


 


Sodium Level    140 mmol/L 


 


Potassium Level    3.3 mmol/L 


 


Chloride Level    105 mmol/L 


 


Carbon Dioxide Level    28 mmol/L 


 


Anion Gap    7.0 mmol/L 


 


Blood Urea Nitrogen    8 mg/dl 


 


Creatinine    1.00 mg/dl 


 


Est Creatinine Clear Calc


Drug Dose 


  


  


  78.8 ml/min 


 


 


Estimated GFR (


American) 


  


  


  85.0 


 


 


Estimated GFR (Non-


American 


  


  


  73.3 


 


 


BUN/Creatinine Ratio    8.0 


 


Random Glucose    108 mg/dl 


 


Calcium Level    7.9 mg/dl 


 


Test


  7/30/17


11:50 


  


  


 


 


Bedside Glucose 96 mg/dl    











Assessment and Plan


77 yo M with stubborn sigmoid volvulus, endoscopic decompression x2, as per DR Payan will likely need definitive general surgical procedure if not 

clearing by early this week.





PMHx of T2DM with Diabetic Neuropathy, HTN, Gout, GIULIA on CPAP, H/o DVT with PE (

2014) off anticoagulation, diabetic gastroparesis, anxiety disorder 





Sigmoid Volvulus General Surgery including Tanmay and Dr. Payan 


 CT 7/27 reviewed showing sigmoid volvulus and appears to be 8 cm at the 

largest point. colonoscopic decompression 7/28,29, KUB 7/30 shows no worseing 

but persistent small and large bowel dilation





Mild respiratory distress 7/30, perhaps some fluid overload, given iv last and 

stopped ivf, added nebulized medication also





T2DM with Diabetic Neuropathy: A1c 5.9 (2/22/17), Last Hgb A1C was 5.9 in Feb 2017  


-SSI with Accu-Cheks ACHS





Hypokalemia follow daily and replete as needed





Restless leg syndrome- Continue Carbidopa levodopa  mg TID





Gout:- Allopurinol 300 mg daily once PO intake allowed





GIULIA: CPAP  clonazepam held,  sertraline 25 mg daily when able to take PO





DVT Prophylaxis: Heparin subcutaneous, teds, scds





Code Status: FULL RESUSCITATION

## 2017-07-31 VITALS
HEART RATE: 95 BPM | OXYGEN SATURATION: 95 % | TEMPERATURE: 98.6 F | DIASTOLIC BLOOD PRESSURE: 83 MMHG | SYSTOLIC BLOOD PRESSURE: 147 MMHG

## 2017-07-31 VITALS — DIASTOLIC BLOOD PRESSURE: 72 MMHG | HEART RATE: 72 BPM | SYSTOLIC BLOOD PRESSURE: 154 MMHG

## 2017-07-31 VITALS
HEART RATE: 105 BPM | TEMPERATURE: 98.06 F | OXYGEN SATURATION: 95 % | DIASTOLIC BLOOD PRESSURE: 78 MMHG | SYSTOLIC BLOOD PRESSURE: 151 MMHG

## 2017-07-31 VITALS — DIASTOLIC BLOOD PRESSURE: 73 MMHG | SYSTOLIC BLOOD PRESSURE: 155 MMHG | HEART RATE: 72 BPM

## 2017-07-31 VITALS
SYSTOLIC BLOOD PRESSURE: 170 MMHG | HEART RATE: 71 BPM | OXYGEN SATURATION: 93 % | DIASTOLIC BLOOD PRESSURE: 73 MMHG | TEMPERATURE: 99.32 F

## 2017-07-31 VITALS
TEMPERATURE: 99.68 F | HEART RATE: 106 BPM | OXYGEN SATURATION: 94 % | SYSTOLIC BLOOD PRESSURE: 139 MMHG | DIASTOLIC BLOOD PRESSURE: 76 MMHG

## 2017-07-31 VITALS — SYSTOLIC BLOOD PRESSURE: 165 MMHG | DIASTOLIC BLOOD PRESSURE: 85 MMHG | HEART RATE: 72 BPM

## 2017-07-31 VITALS — OXYGEN SATURATION: 91 % | HEART RATE: 66 BPM

## 2017-07-31 VITALS
OXYGEN SATURATION: 95 % | HEART RATE: 105 BPM | SYSTOLIC BLOOD PRESSURE: 145 MMHG | DIASTOLIC BLOOD PRESSURE: 72 MMHG | TEMPERATURE: 99.5 F

## 2017-07-31 VITALS — OXYGEN SATURATION: 95 % | HEART RATE: 87 BPM

## 2017-07-31 VITALS — HEART RATE: 57 BPM | OXYGEN SATURATION: 94 %

## 2017-07-31 LAB
ANION GAP SERPL CALC-SCNC: 8 MMOL/L (ref 3–11)
ANION GAP SERPL CALC-SCNC: 8 MMOL/L (ref 3–11)
BUN SERPL-MCNC: 7 MG/DL (ref 7–18)
BUN SERPL-MCNC: 8 MG/DL (ref 7–18)
BUN/CREAT SERPL: 7.6 (ref 10–20)
BUN/CREAT SERPL: 7.9 (ref 10–20)
CALCIUM SERPL-MCNC: 8.1 MG/DL (ref 8.5–10.1)
CALCIUM SERPL-MCNC: 8.5 MG/DL (ref 8.5–10.1)
CHLORIDE SERPL-SCNC: 104 MMOL/L (ref 98–107)
CHLORIDE SERPL-SCNC: 105 MMOL/L (ref 98–107)
CO2 SERPL-SCNC: 28 MMOL/L (ref 21–32)
CO2 SERPL-SCNC: 29 MMOL/L (ref 21–32)
CREAT CL PREDICTED SERPL C-G-VRATE: 80.4 ML/MIN
CREAT CL PREDICTED SERPL C-G-VRATE: 83 ML/MIN
CREAT SERPL-MCNC: 0.95 MG/DL (ref 0.6–1.4)
CREAT SERPL-MCNC: 0.98 MG/DL (ref 0.6–1.4)
EOSINOPHIL NFR BLD AUTO: 196 K/UL (ref 130–400)
GLUCOSE SERPL-MCNC: 102 MG/DL (ref 70–99)
GLUCOSE SERPL-MCNC: 106 MG/DL (ref 70–99)
HCT VFR BLD CALC: 40.6 % (ref 42–52)
MAGNESIUM SERPL-MCNC: 1.9 MG/DL (ref 1.8–2.4)
MCH RBC QN AUTO: 31.1 PG (ref 25–34)
MCHC RBC AUTO-ENTMCNC: 33.7 G/DL (ref 32–36)
MCV RBC AUTO: 92.3 FL (ref 80–100)
PMV BLD AUTO: 9.7 FL (ref 7.4–10.4)
POTASSIUM SERPL-SCNC: 2.8 MMOL/L (ref 3.5–5.1)
POTASSIUM SERPL-SCNC: 2.9 MMOL/L (ref 3.5–5.1)
RBC # BLD AUTO: 4.4 M/UL (ref 4.7–6.1)
SODIUM SERPL-SCNC: 140 MMOL/L (ref 136–145)
SODIUM SERPL-SCNC: 142 MMOL/L (ref 136–145)
WBC # BLD AUTO: 8.43 K/UL (ref 4.8–10.8)

## 2017-07-31 PROCEDURE — 0D1N0ZN BYPASS SIGMOID COLON TO SIGMOID COLON, OPEN APPROACH: ICD-10-PCS | Performed by: SURGERY

## 2017-07-31 RX ADMIN — POTASSIUM CHLORIDE SCH MLS/HR: 10 INJECTION, SOLUTION INTRAVENOUS at 20:55

## 2017-07-31 RX ADMIN — POLYETHYLENE GLYCOL 3350, SODIUM SULFATE ANHYDROUS, SODIUM BICARBONATE, SODIUM CHLORIDE, POTASSIUM CHLORIDE SCH DOSE: 236; 22.74; 6.74; 5.86; 2.97 POWDER, FOR SOLUTION ORAL at 03:46

## 2017-07-31 RX ADMIN — IPRATROPIUM BROMIDE AND ALBUTEROL SULFATE SCH ML: .5; 3 SOLUTION RESPIRATORY (INHALATION) at 18:15

## 2017-07-31 RX ADMIN — RANITIDINE HYDROCHLORIDE SCH MLS/HR: 25 INJECTION, SOLUTION INTRAMUSCULAR; INTRAVENOUS at 18:36

## 2017-07-31 RX ADMIN — POLYETHYLENE GLYCOL 3350, SODIUM SULFATE ANHYDROUS, SODIUM BICARBONATE, SODIUM CHLORIDE, POTASSIUM CHLORIDE SCH DOSE: 236; 22.74; 6.74; 5.86; 2.97 POWDER, FOR SOLUTION ORAL at 00:00

## 2017-07-31 RX ADMIN — CARBIDOPA AND LEVODOPA SCH TAB: 25; 100 TABLET ORAL at 13:44

## 2017-07-31 RX ADMIN — INSULIN ASPART SCH UNITS: 100 INJECTION, SOLUTION INTRAVENOUS; SUBCUTANEOUS at 00:00

## 2017-07-31 RX ADMIN — RANITIDINE HYDROCHLORIDE SCH MLS/HR: 25 INJECTION, SOLUTION INTRAMUSCULAR; INTRAVENOUS at 01:31

## 2017-07-31 RX ADMIN — INSULIN ASPART SCH UNITS: 100 INJECTION, SOLUTION INTRAVENOUS; SUBCUTANEOUS at 18:00

## 2017-07-31 RX ADMIN — IPRATROPIUM BROMIDE AND ALBUTEROL SULFATE SCH ML: .5; 3 SOLUTION RESPIRATORY (INHALATION) at 06:54

## 2017-07-31 RX ADMIN — CIPROFLOXACIN SCH MLS/HR: 2 INJECTION, SOLUTION INTRAVENOUS at 19:56

## 2017-07-31 RX ADMIN — CARBIDOPA AND LEVODOPA SCH TAB: 25; 100 TABLET ORAL at 09:34

## 2017-07-31 RX ADMIN — SODIUM CHLORIDE AND POTASSIUM CHLORIDE SCH MLS/HR: 9; 1.49 INJECTION, SOLUTION INTRAVENOUS at 18:37

## 2017-07-31 RX ADMIN — MORPHINE SULFATE PRN MG: 50 INJECTION, SOLUTION, CONCENTRATE INTRAVENOUS at 22:49

## 2017-07-31 RX ADMIN — METRONIDAZOLE SCH MLS/HR: 500 INJECTION, SOLUTION INTRAVENOUS at 01:31

## 2017-07-31 RX ADMIN — CARBIDOPA AND LEVODOPA SCH TAB: 25; 100 TABLET ORAL at 20:55

## 2017-07-31 RX ADMIN — METRONIDAZOLE SCH MLS/HR: 500 INJECTION, SOLUTION INTRAVENOUS at 09:33

## 2017-07-31 RX ADMIN — LORAZEPAM PRN MLS/MIN: 2 INJECTION INTRAMUSCULAR; INTRAVENOUS at 00:56

## 2017-07-31 RX ADMIN — CIPROFLOXACIN SCH MLS/HR: 2 INJECTION, SOLUTION INTRAVENOUS at 03:39

## 2017-07-31 RX ADMIN — INSULIN ASPART SCH UNITS: 100 INJECTION, SOLUTION INTRAVENOUS; SUBCUTANEOUS at 12:00

## 2017-07-31 RX ADMIN — LORAZEPAM PRN MLS/MIN: 2 INJECTION INTRAMUSCULAR; INTRAVENOUS at 18:34

## 2017-07-31 RX ADMIN — IPRATROPIUM BROMIDE AND ALBUTEROL SULFATE SCH ML: .5; 3 SOLUTION RESPIRATORY (INHALATION) at 11:39

## 2017-07-31 RX ADMIN — RANITIDINE HYDROCHLORIDE SCH MLS/HR: 25 INJECTION, SOLUTION INTRAMUSCULAR; INTRAVENOUS at 09:43

## 2017-07-31 RX ADMIN — METRONIDAZOLE SCH MLS/HR: 500 INJECTION, SOLUTION INTRAVENOUS at 18:36

## 2017-07-31 RX ADMIN — IPRATROPIUM BROMIDE AND ALBUTEROL SULFATE SCH ML: .5; 3 SOLUTION RESPIRATORY (INHALATION) at 15:29

## 2017-07-31 RX ADMIN — INSULIN ASPART SCH UNITS: 100 INJECTION, SOLUTION INTRAVENOUS; SUBCUTANEOUS at 06:00

## 2017-07-31 RX ADMIN — MORPHINE SULFATE PRN MG: 50 INJECTION, SOLUTION, CONCENTRATE INTRAVENOUS at 17:54

## 2017-07-31 RX ADMIN — POLYETHYLENE GLYCOL 3350, SODIUM SULFATE ANHYDROUS, SODIUM BICARBONATE, SODIUM CHLORIDE, POTASSIUM CHLORIDE SCH DOSE: 236; 22.74; 6.74; 5.86; 2.97 POWDER, FOR SOLUTION ORAL at 12:00

## 2017-07-31 RX ADMIN — SODIUM CHLORIDE SCH MLS/HR: 900 INJECTION, SOLUTION INTRAVENOUS at 17:00

## 2017-07-31 RX ADMIN — POLYETHYLENE GLYCOL 3350, SODIUM SULFATE ANHYDROUS, SODIUM BICARBONATE, SODIUM CHLORIDE, POTASSIUM CHLORIDE SCH DOSE: 236; 22.74; 6.74; 5.86; 2.97 POWDER, FOR SOLUTION ORAL at 00:47

## 2017-07-31 RX ADMIN — POTASSIUM CHLORIDE SCH MLS/HR: 10 INJECTION, SOLUTION INTRAVENOUS at 19:54

## 2017-07-31 RX ADMIN — POLYETHYLENE GLYCOL 3350, SODIUM SULFATE ANHYDROUS, SODIUM BICARBONATE, SODIUM CHLORIDE, POTASSIUM CHLORIDE SCH DOSE: 236; 22.74; 6.74; 5.86; 2.97 POWDER, FOR SOLUTION ORAL at 09:34

## 2017-07-31 NOTE — ANESTHESIOLOGY PROGRESS NOTE
Anesthesia Post Op Note


Date & Time


Jul 31, 2017 at 17:11





Vital Signs


Pain Intensity:  0





Vital Signs Past 12 Hours








  Date Time  Temp Pulse Resp B/P (MAP) Pulse Ox O2 Delivery O2 Flow Rate FiO2


 


7/31/17 16:55  85 17 170/88 97 Oxymask 6 


 


7/31/17 16:45  84 19 162/92 95 Oxymask 8 


 


7/31/17 16:35  86 19 172/95 96 Oxymask 10 


 


7/31/17 16:28 37.0 82 16 167/96 95 Oxymask 10 


 


7/31/17 11:39  66 16  91 Room Air  


 


7/31/17 10:06  72  154/72 (99)    


 


7/31/17 09:37  72  155/73 (100)    


 


7/31/17 08:20      Room Air  


 


7/31/17 07:42  72  165/85 (111)    


 


7/31/17 07:21 37.4 71 20 170/73 (105) 93 Room Air  


 


7/31/17 07:12  57 16  94 BiPAP/CPAP  











Notes


Mental Status:  alert / awake / arousable, participated in evaluation


Pt Amnestic to Procedure:  Yes


Nausea / Vomiting:  adequately controlled


Pain:  adequately controlled


Airway Patency, RR, SpO2:  stable & adequate


BP & HR:  stable & adequate


Hydration State:  stable & adequate


Anesthetic Complications:  no major complications apparent

## 2017-07-31 NOTE — [facility] OPERATIVE REPORT
Operative Report


Operative Date


Jul 31, 2017.





Pre-Operative Diagnosis





Sigmoid volvulus





Post-Operative Diagnosis





Sigmoid volvulus





Procedure(s) Performed





Sigmoid Colectomy with primary anastomosis;enterolysis





Surgeon








Assistant Surgeon(s)


Carlos Mcfarlane PA-C





Estimated Blood Loss


25 mL





Findings


1. dilated/"floppy" sigmoid colon


2. abdominal adhesions


3. dilated small bowel.





Specimens





A: Sigmoid Colon





Drains


CEDRICK drain into pelvis





Anesthesia


GET





Disposition


Recovery Room / PACU





Description of Procedure


After informed consent was obtained the patient was taken to the operating 

suite placed in supine position.  After successful intubation a Weaver catheter 

was placed and the abdomen was shaved and sterilely prepped and draped in usual 

fashion.  Prior to draping and prepping we did place the patient in the low 

lithotomy position in yellowfin stirrups.  The entire perineum/rectal area as 

well as abdomen was prepped with a Betadine solution.  We began with a midline 

incision through his old scar line from above the umbilicus down to the 

suprapubic area.  This carried down through the soft tissue using 

electrocautery.  Anterior fascia was opened using electrocautery and blunt 

finger penetration was used to enter the abdomen.  We extended the incision to 

both poles using electrocautery.  Once in the abdomen we immediately noted 

diffuse small bowel dilation.  We did run the entire small bowel from the 

ligament of Treitz down to his ileotransverse colon anastomosis.  There was no 

actual bowel obstruction.  There were some areas of adhesions that we took down 

with blunt finger fractionation as well as electrocautery.  I presume the small 

bowel dilation was from the 2 colonoscopies with a valve type effect of the 

sigmoid colon.  We were able to pack this in the upper abdomen.  The entire 

large bowel was dilated as well but was markedly dilated thickened and 

particularly floppy in the sigmoid colon.  We began by mobilizing the white 

line of cold toldt.  We picked a portion of the bowel where it appeared to 

still have good serosal muscular tone and transected it using a ASHLYN purple 

cartridge linear stapler.  We then used the LigaSure device to come down the 

mesentery towards the pelvis.  Again it was markedly dilated thickened and 

floppy.  The mesentery was also elongated and thickened.  Once we got down into 

the pelvis just proximal to the peritoneal reflection we initially attempted to 

transect it with a ASHLYN linear stapler after creating a small window in the 

mesentery.  However whether the bowel was too dilated /thick or whether it was 

a staple misfire is unclear however it created a small enterotomy in the colon.

  Immediately suctioned out the succus and closed the defect using 2-0 silk.  

We then further mobilized the colon and rectum might using a LigaSure to come 

down on the mesenteric defect.  We then created a small window in the mesentery 

and used a 90 mm TA stapling device to transect the rectosigmoid.  We finished 

off the mesentery with the LigaSure and passed off the specimen.  We then 

thoroughly irrigated the abdomen.  We opened the staple line after clamping the 

bowel of the proximal part of the left colon and put a 2-0 silk pursestring.  

We used sizers to estimate the size of the EEA to be 31 mm.  We placed the 

anvil from a 31 mm stapler and secured it using the pursestring device.  We 

then used sizers to come in through the rectum and up to the rectum up to the 

stump.  This went without incident and we followed this by putting the handle 

of the EEA and then deployed the spike.  We connected the anvil to the handle 

secured them together and fired creating a circular anastomosis.  Both "donuts" 

were intact.  We did insufflate the anastomosis under water with a rigids 

proctoscope there was no evidence of any anastomotic leak.  We thoroughly 

irrigated the abdomen a final time.  There was adequate hemostasis no other 

abnormalities were seen.  We did place a 10 flat Dave-Child drain in the 

pelvis and brought through a separate stab incision.  We then closed the fascia 

starting at both poles, in the midline with #1 PDS securing them together.  

Soft tissue was irrigated skin was closed using skin staples.  Silver dressing 

was applied.  The patient was awaken x-rayed and transferred recovery in stable 

condition


I attest to the content of the Intraoperative Record and any orders documented 

therein.  Any exceptions are noted below.

## 2017-07-31 NOTE — HOSPITALIST PROGRESS NOTE
Hospitalist Progress Note


Date of Service


Jul 31, 2017.


 (Bertha Antonio PA-C)





Subjective


Pt evaluation today including:  conversation w/ patient, conversation w/ family

, physical exam, chart review, lab review, review of studies, review of 

inpatient medication list


Patient seen and evaluated. Resting comfortably in bed and daughter at bedside.


Reports that he is hungry but plans for surgical intervention at 1 PM today. 

Reporting that his abdomen was softer over the weekend after decompression but 

started to distend again.


Denies having and pain with it at this time. Did have low potassium and will 

replete. Verbalized no other complaints.





   Constitutional:  No fever, No chills


   Respiratory:  No shortness of breath


   Cardiovascular:  No chest pain


   Abdomen:  No pain, No nausea, No vomiting


   Musculoskeletal:  No swelling, No calf pain


   Male :  No dysuria


   Skin:  No rash (Bertha Antonio PA-C)





Medications





Current Inpatient Medications








 Medications


  (Trade)  Dose


 Ordered  Sig/Kalpesh


 Route  Start Time


 Stop Time Status Last Admin


Dose Admin


 


 Carbidopa/Levodopa


  (Sinemet 25/


 100MG Tab)  1 tab  TID


 PO  7/27/17 21:00


 8/26/17 20:59  7/31/17 09:34


1 TAB


 


 Lorazepam


  (Ativan Inj)  0.5 mg  Q4H  PRN


 IV  7/27/17 16:00


 8/26/17 15:59   


 


 


 Lorazepam


  (Ativan Inj)  1 mg  Q4H  PRN


 IV  7/27/17 16:00


 8/26/17 15:59   


 


 


 Promethazine HCl


 12.5 mg/Sodium


 Chloride  50.5 ml @ 


 204 mls/hr  Q6H  PRN


 IV  7/27/17 16:00


 8/26/17 15:59   


 


 


 Morphine Sulfate


  (MoRPHine


 SULFATE INJ)  2 mg  Q4H  PRN


 IV  7/27/17 16:00


 8/10/17 15:59  7/27/17 19:24


2 MG


 


 Morphine Sulfate


  (MoRPHine


 SULFATE INJ)  4 mg  Q4H  PRN


 IV  7/27/17 16:00


 8/10/17 15:59   


 


 


 Ondansetron HCl


  (Zofran Inj)  4 mg  Q6H  PRN


 IV  7/27/17 16:00


 8/26/17 15:59  7/28/17 05:47


4 MG


 


 Ranitidine HCl 50


 mg/Dextrose  102 ml @ 


 200 mls/hr  Q8H


 IV  7/27/17 18:00


 8/26/17 17:59  7/31/17 09:43


200 MLS/HR


 


 Insulin Aspart


  (novoLOG ASPART)  SLIDING


 SCALE


 PARAMETER  Q6


 SC  7/28/17 06:00


 8/27/17 05:59   


 


 


 Ciprofloxacin/


 Dextrose 400 mg/


 Prmx  200 ml @ 


 100 mls/hr  Q12H


 IV  7/28/17 16:00


 8/7/17 20:59  7/31/17 03:39


100 MLS/HR


 


 Metronidazole 500


 mg/Prmx  100 ml @ 


 100 mls/hr  Q8H


 IV  7/28/17 18:00


 8/7/17 17:59  7/31/17 09:33


100 MLS/HR


 


 Polyethylene


 Glycol/


 Electrolytes


  (Golytely Soln)  0.4 dose  Q4H


 SD  7/29/17 12:00


 8/28/17 11:59  7/31/17 09:34


0.4 DOSE


 


 Lorazepam 0.5 mg/


 Syringe  1 ml @ 1


 mls/min  Q4H  PRN


 IV  7/29/17 21:30


 8/28/17 21:29  7/31/17 00:56


1 MLS/MIN


 


 Lorazepam 1 mg/


 Syringe  1 ml @ 1


 mls/min  Q4H  PRN


 IV  7/29/17 21:30


 8/28/17 21:29  7/29/17 23:42


1 MLS/MIN


 


 Albuterol/


 Ipratropium


  (Duoneb)  3 ml  QIDR


 INH  7/30/17 12:00


 8/29/17 11:59  7/31/17 11:39


3 ML


 


 Albuterol/


 Ipratropium


  (Duoneb)  3 ml  Q2R  PRN


 INH  7/30/17 08:15


 8/29/17 08:14   


 


 


 Hydralazine HCl


  (HydrALAZINE INJ)  10 mg  Q6H  PRN


 IV.  7/31/17 08:30


 8/30/17 08:29   


 


 


 Potassium


 Chloride 10 meq/


 Prmx  100 ml @ 


 100 mls/hr  TODAY@1115  ONCE


 IV  7/31/17 11:15


 7/31/17 12:14   


 








 (Bertha Antonio, EMMANUEL)





Objective


Vital Signs











  Date Time  Temp Pulse Resp B/P (MAP) Pulse Ox O2 Delivery O2 Flow Rate FiO2


 


7/31/17 07:42  72  165/85 (111)    


 


7/31/17 07:21 37.4 71 20 170/73 (105) 93 Room Air  


 


7/31/17 07:12  57 16  94 BiPAP/CPAP  


 


7/31/17 00:30      Room Air  





      CPAP  


 


7/30/17 22:52 37.1 62 16 163/75 (104) 93 Room Air  


 


7/30/17 19:18  68 16  93 Room Air  


 


7/30/17 15:45  59 16  93 Room Air  


 


7/30/17 15:30      Nasal Cannula 1.0 


 


7/30/17 15:06 37.2 64 18 177/76 (109) 95 Room Air  


 


7/30/17 11:20  60 16  96 Room Air  


 


7/30/17 11:00 37.2 61 20 159/70 (99) 92 Room Air  








 (Bertha Antonio, PA-C)





Physical Exam


General Appearance:  WD/WN, no apparent distress


Eyes:  sclerae normal


ENT:  hearing grossly normal


Neck:  supple, no JVD, trachea midline


Respiratory/Chest:  lungs clear, normal breath sounds, no respiratory distress, 

no accessory muscle use


Cardiovascular:  regular rate, rhythm, no gallop, no murmur


Abdomen:  non tender, + abnormal bowel sounds (hypoactive), + distended


Extremities:  no pedal edema, no calf tenderness


Neurologic/Psychiatric:  alert, oriented x 3


Skin:  normal color, warm/dry


 (Bertha Antonio, PA-C)





Laboratory Results





Last 24 Hours








Test


  7/30/17


11:50 7/30/17


18:22 7/31/17


00:42 7/31/17


05:58


 


Bedside Glucose 96 mg/dl  107 mg/dl  96 mg/dl  103 mg/dl 


 


Test


  7/31/17


08:05 


  


  


 








 (Bertha Antonio, PA-C)





Assessment and Plan


This is a 75 yo M with PMHx of T2DM with Diabetic Neuropathy, HTN, Gout, GIULIA on 

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 

anxiety disorder who presents with abdominal pain and bloating since Tuesday. 





Sigmoid Volvulus S/P Decompression 7/28 and 7/29:


- Has H/O abdominal surgical removal of polyp and H/O gastroparesis - will 

limit opioids and benzos when possible


- Plan for surgical intervention this afternoon 


- Placed on Cipro 400 mg IV BID and Flagyl 500 mg IV Q8H on 7/28 - concern for 

gastritis and will continue at this time


- Ranitidine 50 mg IV Q8H


- GI and General Surgery following - recommendations reviewed and addressed


   -- Was utilizing Golytely SD Q4H





T2DM with Diabetic Neuropathy: A1c 5.9 (2/22/17)


- Pt was recently taken off metformin by his PCP


- SSI - has not required coverage with limited oral intake





Hypokalemia:


- Replete orally and intravenously - continue to monitor and replete as 

necessary





Restless Leg Syndrome: STABLE


- Gabapentin 300 mg QID and Carbidopa/Levodopa 25/100 mg TID





HTN:


- On Triamterene/HCT 37.5/25 - 1/2 tablet daily - will hold at this time due to 

surgical intervention


- Hydralazine PRN





Gout:


- Allopurinol 300 mg daily after advanced diet





GIULIA:


- CPAP at night - allow ativan IV prn for claustrophobia with cpap and anxiety


- clonazepam and sertraline 25 mg daily when diet advanced





DVT Prophylaxis: BERENICE/SCDs; withhold chemical prophylaxis for surgical 

intervention





Code Status: FULL RESUSCITATION





Disposition: Patient from home - daughter prefers return home with home PT/OT 

if necessary


- PT/OT evaluations after surgical recovery


Continued [facility] stay due to:  multiple IV medications needed


Discharge planning:  home with home health


 (Bertha Antonio, EMMANUEL)





Reviewed:  Pt Seen/Exam by Me


 (Mar Chandler MD)


History


Physician Assistant Supervision Note:





I interviewed and examined the patient. Discussed with CLEVELAND Antonio and agree with 

findings and plan as documented in the note. Any exceptions or clarifications 

are listed here: 





Pt just returned from the PACU after sigmoid colectomy. He is drowsy but wakes 

up for questioning, says he feels fine, no pain. Daughter voices concerns about 

him having clonazepam available because last time he was in the hospital he didn

't get it and he was climbing out of bed, pulling at his lines and Weaver.


Since last admission, he was diagnosed with Parkinson's and started on Sinemet. 

The dosing is now up to 1.5 tabs tid (I updated the Med Rec from home).


This greatly improved his symptoms he was having.





Vitals reviewed


NAD, lying in bed


NGT in place draining bilious fluid


RRR no mgr


Mild exp wheezes throughout, breathing unlabored


Abd hypoactive BS, soft, NT, dressing over midline incision is c/d/i, CEDRICK drain 

with scant serosang fluid in it


Weaver in palce draining clear yellow urine


Ext: no edema, no calf tenderness, 2+ DP pulses





75 yo male with a h/o PD, DMII, anxiety, HTN, gout, GIULIA on CPAP, here with 

sigmoid volvulus requiring sigmoid colectomy.


-post-op care as per Surgery, continue NGT, CEDRICK drain and adv diet as tolerated


-continue to hold all po meds (Sinemet, Dyazide, vitamins, Zoloft, clonazepam) 

and restart when able to


-can give IV ativan prn anxiety in place of clonazepam for now


Severe hypokalemia secondary to poor po intake--> received 60 meq today and 

repeat only up to 3.0 this afternoon. Check K+ STAT and will give KCl 20meq IV 

now, may need more after result comes back


-follow post-op CBC, PRP





Documented By:  Mar Chandler


 (Mar Chandler MD)

## 2017-07-31 NOTE — SURGERY PROGRESS NOTE
Surgery Progress Note


Date of Service


Jul 31, 2017.





Subjective


No complaints





Objective


Vital Signs:











  Date Time  Temp Pulse Resp B/P (MAP) Pulse Ox O2 Delivery O2 Flow Rate FiO2


 


7/31/17 07:42  72  165/85 (111)    


 


7/31/17 07:21 37.4 71 20 170/73 (105) 93 Room Air  


 


7/31/17 07:12  57 16  94 BiPAP/CPAP  


 


7/31/17 00:30      Room Air  





      CPAP  


 


7/30/17 22:52 37.1 62 16 163/75 (104) 93 Room Air  


 


7/30/17 19:18  68 16  93 Room Air  


 


7/30/17 15:45  59 16  93 Room Air  


 


7/30/17 15:30      Nasal Cannula 1.0 


 


7/30/17 15:06 37.2 64 18 177/76 (109) 95 Room Air  


 


7/30/17 11:20  60 16  96 Room Air  


 


7/30/17 11:00 37.2 61 20 159/70 (99) 92 Room Air  








Abdomen:  soft, + distended, + pertinent finding (rectal tube 175)


Laboratory Results:





Results Past 24 Hours








Test


  7/30/17


11:50 7/30/17


18:22 7/31/17


00:42 7/31/17


05:58 Range/Units


 


 


Bedside Glucose 96 107 96 103 70-99  mg/dl


 


Test


  7/31/17


08:12 


  


  


  Range/Units


 











Assessment & Plan


sigmoid volvulus


   partially decompressed after endoscopy x2 and rectal tube placement


   high likelihood of recurrence once tube is removed


   will proceed with sigmoid colectomy this afternoon





as above. discussed with pt and his daughter. discussed options/risks. 

discussed high likelihood of recurrence.  discussed surgical risks ( bleeding/

infection/dvt/pe/mi/leaks/injury to other organs such as ureter/bladder/small 

bowel etc...).  answered questions. will proceed with open sigmoidectomy this 

afternoon.

## 2017-07-31 NOTE — DIAGNOSTIC IMAGING REPORT
KUMOS[facility]



CLINICAL HISTORY: Volvulus    



COMPARISON STUDY:  7/30/2017 



FINDINGS: A rectal tube is again visualized. The tip projects to the level the

proximal descending colon. There is persistent small bowel dilatation.



IMPRESSION:  

1. Persistent small bowel dilatation

2. Rectal tube with its tip at the level of the proximal descending colon 







Electronically signed by:  Tramaine Lui M.D.

7/31/2017 9:17 AM



Dictated Date/Time:  7/31/2017 9:16 AM

## 2017-08-01 VITALS — HEART RATE: 79 BPM | OXYGEN SATURATION: 95 %

## 2017-08-01 VITALS
SYSTOLIC BLOOD PRESSURE: 122 MMHG | TEMPERATURE: 98.24 F | DIASTOLIC BLOOD PRESSURE: 66 MMHG | OXYGEN SATURATION: 94 % | HEART RATE: 84 BPM

## 2017-08-01 VITALS — OXYGEN SATURATION: 92 % | SYSTOLIC BLOOD PRESSURE: 132 MMHG | HEART RATE: 96 BPM | DIASTOLIC BLOOD PRESSURE: 72 MMHG

## 2017-08-01 VITALS — TEMPERATURE: 98.42 F | DIASTOLIC BLOOD PRESSURE: 68 MMHG | SYSTOLIC BLOOD PRESSURE: 128 MMHG | HEART RATE: 81 BPM

## 2017-08-01 VITALS
SYSTOLIC BLOOD PRESSURE: 121 MMHG | TEMPERATURE: 98.24 F | OXYGEN SATURATION: 93 % | HEART RATE: 84 BPM | DIASTOLIC BLOOD PRESSURE: 67 MMHG

## 2017-08-01 VITALS
SYSTOLIC BLOOD PRESSURE: 131 MMHG | DIASTOLIC BLOOD PRESSURE: 75 MMHG | OXYGEN SATURATION: 96 % | HEART RATE: 86 BPM | TEMPERATURE: 98.78 F

## 2017-08-01 VITALS — HEART RATE: 83 BPM | OXYGEN SATURATION: 96 %

## 2017-08-01 VITALS — OXYGEN SATURATION: 94 %

## 2017-08-01 VITALS — HEART RATE: 90 BPM | OXYGEN SATURATION: 95 %

## 2017-08-01 VITALS
OXYGEN SATURATION: 94 % | SYSTOLIC BLOOD PRESSURE: 130 MMHG | TEMPERATURE: 98.42 F | DIASTOLIC BLOOD PRESSURE: 64 MMHG | HEART RATE: 92 BPM

## 2017-08-01 VITALS — HEART RATE: 86 BPM | OXYGEN SATURATION: 98 %

## 2017-08-01 LAB
ANION GAP SERPL CALC-SCNC: 7 MMOL/L (ref 3–11)
BUN SERPL-MCNC: 9 MG/DL (ref 7–18)
BUN/CREAT SERPL: 9 (ref 10–20)
CALCIUM SERPL-MCNC: 7.6 MG/DL (ref 8.5–10.1)
CHLORIDE SERPL-SCNC: 111 MMOL/L (ref 98–107)
CO2 SERPL-SCNC: 25 MMOL/L (ref 21–32)
CREAT CL PREDICTED SERPL C-G-VRATE: 80.4 ML/MIN
CREAT SERPL-MCNC: 0.98 MG/DL (ref 0.6–1.4)
EOSINOPHIL NFR BLD AUTO: 240 K/UL (ref 130–400)
GLUCOSE SERPL-MCNC: 155 MG/DL (ref 70–99)
HCT VFR BLD CALC: 37.1 % (ref 42–52)
MCH RBC QN AUTO: 31.8 PG (ref 25–34)
MCHC RBC AUTO-ENTMCNC: 34.2 G/DL (ref 32–36)
MCV RBC AUTO: 93 FL (ref 80–100)
PMV BLD AUTO: 9.9 FL (ref 7.4–10.4)
POTASSIUM SERPL-SCNC: 3.6 MMOL/L (ref 3.5–5.1)
RBC # BLD AUTO: 3.99 M/UL (ref 4.7–6.1)
SODIUM SERPL-SCNC: 143 MMOL/L (ref 136–145)
WBC # BLD AUTO: 9.69 K/UL (ref 4.8–10.8)

## 2017-08-01 RX ADMIN — METRONIDAZOLE SCH MLS/HR: 500 INJECTION, SOLUTION INTRAVENOUS at 17:43

## 2017-08-01 RX ADMIN — SODIUM CHLORIDE AND POTASSIUM CHLORIDE SCH MLS/HR: 9; 1.49 INJECTION, SOLUTION INTRAVENOUS at 13:54

## 2017-08-01 RX ADMIN — INSULIN ASPART SCH UNITS: 100 INJECTION, SOLUTION INTRAVENOUS; SUBCUTANEOUS at 00:00

## 2017-08-01 RX ADMIN — SODIUM CHLORIDE AND POTASSIUM CHLORIDE SCH MLS/HR: 9; 1.49 INJECTION, SOLUTION INTRAVENOUS at 00:07

## 2017-08-01 RX ADMIN — RANITIDINE HYDROCHLORIDE SCH MLS/HR: 25 INJECTION, SOLUTION INTRAMUSCULAR; INTRAVENOUS at 02:15

## 2017-08-01 RX ADMIN — METRONIDAZOLE SCH MLS/HR: 500 INJECTION, SOLUTION INTRAVENOUS at 02:49

## 2017-08-01 RX ADMIN — ACETAMINOPHEN SCH MLS/HR: 10 INJECTION, SOLUTION INTRAVENOUS at 11:46

## 2017-08-01 RX ADMIN — IPRATROPIUM BROMIDE AND ALBUTEROL SULFATE SCH ML: .5; 3 SOLUTION RESPIRATORY (INHALATION) at 07:06

## 2017-08-01 RX ADMIN — MORPHINE SULFATE PRN MG: 50 INJECTION, SOLUTION, CONCENTRATE INTRAVENOUS at 15:15

## 2017-08-01 RX ADMIN — MORPHINE SULFATE PRN MG: 50 INJECTION, SOLUTION, CONCENTRATE INTRAVENOUS at 22:21

## 2017-08-01 RX ADMIN — MORPHINE SULFATE PRN MG: 50 INJECTION, SOLUTION, CONCENTRATE INTRAVENOUS at 07:13

## 2017-08-01 RX ADMIN — IPRATROPIUM BROMIDE AND ALBUTEROL SULFATE SCH ML: .5; 3 SOLUTION RESPIRATORY (INHALATION) at 11:19

## 2017-08-01 RX ADMIN — IPRATROPIUM BROMIDE AND ALBUTEROL SULFATE SCH ML: .5; 3 SOLUTION RESPIRATORY (INHALATION) at 15:38

## 2017-08-01 RX ADMIN — SODIUM CHLORIDE SCH MLS/HR: 900 INJECTION, SOLUTION INTRAVENOUS at 17:00

## 2017-08-01 RX ADMIN — RANITIDINE HYDROCHLORIDE SCH MLS/HR: 25 INJECTION, SOLUTION INTRAMUSCULAR; INTRAVENOUS at 11:52

## 2017-08-01 RX ADMIN — INSULIN ASPART SCH UNITS: 100 INJECTION, SOLUTION INTRAVENOUS; SUBCUTANEOUS at 18:00

## 2017-08-01 RX ADMIN — CIPROFLOXACIN SCH MLS/HR: 2 INJECTION, SOLUTION INTRAVENOUS at 20:00

## 2017-08-01 RX ADMIN — SODIUM CHLORIDE AND POTASSIUM CHLORIDE SCH MLS/HR: 9; 1.49 INJECTION, SOLUTION INTRAVENOUS at 20:00

## 2017-08-01 RX ADMIN — MORPHINE SULFATE PRN MG: 50 INJECTION, SOLUTION, CONCENTRATE INTRAVENOUS at 09:29

## 2017-08-01 RX ADMIN — MORPHINE SULFATE PRN MG: 50 INJECTION, SOLUTION, CONCENTRATE INTRAVENOUS at 23:07

## 2017-08-01 RX ADMIN — CIPROFLOXACIN SCH MLS/HR: 2 INJECTION, SOLUTION INTRAVENOUS at 09:21

## 2017-08-01 RX ADMIN — CARBIDOPA AND LEVODOPA SCH TAB: 25; 100 TABLET ORAL at 20:27

## 2017-08-01 RX ADMIN — METRONIDAZOLE SCH MLS/HR: 500 INJECTION, SOLUTION INTRAVENOUS at 11:42

## 2017-08-01 RX ADMIN — IPRATROPIUM BROMIDE AND ALBUTEROL SULFATE SCH ML: .5; 3 SOLUTION RESPIRATORY (INHALATION) at 19:29

## 2017-08-01 RX ADMIN — CARBIDOPA AND LEVODOPA SCH TAB: 25; 100 TABLET ORAL at 13:37

## 2017-08-01 RX ADMIN — INSULIN ASPART SCH UNITS: 100 INJECTION, SOLUTION INTRAVENOUS; SUBCUTANEOUS at 12:00

## 2017-08-01 RX ADMIN — CARBIDOPA AND LEVODOPA SCH TAB: 25; 100 TABLET ORAL at 09:00

## 2017-08-01 RX ADMIN — RANITIDINE HYDROCHLORIDE SCH MLS/HR: 25 INJECTION, SOLUTION INTRAMUSCULAR; INTRAVENOUS at 18:28

## 2017-08-01 RX ADMIN — INSULIN ASPART SCH UNITS: 100 INJECTION, SOLUTION INTRAVENOUS; SUBCUTANEOUS at 06:00

## 2017-08-01 RX ADMIN — INSULIN ASPART SCH UNITS: 100 INJECTION, SOLUTION INTRAVENOUS; SUBCUTANEOUS at 23:53

## 2017-08-01 RX ADMIN — ACETAMINOPHEN SCH MLS/HR: 10 INJECTION, SOLUTION INTRAVENOUS at 17:43

## 2017-08-01 RX ADMIN — SODIUM CHLORIDE AND POTASSIUM CHLORIDE SCH MLS/HR: 9; 1.49 INJECTION, SOLUTION INTRAVENOUS at 06:36

## 2017-08-01 NOTE — ANESTHESIOLOGY PROGRESS NOTE
Anesthesia Post Op Note


Date & Time


Aug 1, 2017 at 08:38





Vital Signs


Pain Intensity:  4.0





Vital Signs Past 12 Hours








  Date Time  Temp Pulse Resp B/P (MAP) Pulse Ox O2 Delivery O2 Flow Rate FiO2


 


8/1/17 08:09     94 Nasal Cannula 1.0 


 


8/1/17 07:46 36.9 92 18 130/64 (86) 94 Nasal Cannula 1.0 


 


8/1/17 07:06  90 16  95 Nasal Cannula 2.0 


 


8/1/17 03:06 37.1 86 16 131/75 (93) 96 Nasal Cannula 2.0 


 


8/1/17 00:05      Nasal Cannula 3.0 


 


7/31/17 22:49 36.7 105 16 151/78 (102) 95 Nasal Cannula 3.0 











Notes


Airway Patency, RR, SpO2:  stable & adequate


BP & HR:  stable & adequate


Hydration State:  stable & adequate


Pt Sleeping.

## 2017-08-01 NOTE — SURGERY PROGRESS NOTE
Surgery Progress Note


Date of Service


Aug 1, 2017.





Subjective


Post OP Day:  1


+ complaints (pain control marginal)





Objective


Vital Signs:











  Date Time  Temp Pulse Resp B/P (MAP) Pulse Ox O2 Delivery O2 Flow Rate FiO2


 


8/1/17 07:06  90 16  95 Nasal Cannula 2.0 


 


8/1/17 03:06 37.1 86 16 131/75 (93) 96 Nasal Cannula 2.0 


 


8/1/17 00:05      Nasal Cannula 3.0 


 


7/31/17 22:49 36.7 105 16 151/78 (102) 95 Nasal Cannula 3.0 


 


7/31/17 20:00 37.5 105 18 145/72 (96) 95 Nasal Cannula 3.0 


 


7/31/17 19:00 37.6 106 18 139/76 (97) 94 Nasal Cannula 3.0 


 


7/31/17 18:30      Nasal Cannula 3.0 


 


7/31/17 18:30      Mask 2.0 


 


7/31/17 18:15  87 16  95 Mask 2.0 


 


7/31/17 17:55 37.0 95 18 147/83 (104) 95 Mask 2.0 


 


7/31/17 17:30  88 17 156/81 96 Oxymask 2 


 


7/31/17 17:15  87 18 149/83 96 Oxymask 2 


 


7/31/17 17:05 37.3 86 18 158/84 96 Oxymask 4 


 


7/31/17 16:55  85 17 170/88 97 Oxymask 6 


 


7/31/17 16:45  84 19 162/92 95 Oxymask 8 


 


7/31/17 16:35  86 19 172/95 96 Oxymask 10 


 


7/31/17 16:28 37.0 82 16 167/96 95 Oxymask 10 


 


7/31/17 11:39  66 16  91 Room Air  


 


7/31/17 10:06  72  154/72 (99)    


 


7/31/17 09:37  72  155/73 (100)    


 


7/31/17 08:20      Room Air  


 


7/31/17 07:42  72  165/85 (111)    








Physical Exam:  CEDRICK drainage (860/ 60), urine output (280 overnight)


Abdomen:  soft, + distended (mildly)


Incision(s):  drainage (around drain)


Laboratory Results:





Results Past 24 Hours








Test


  7/31/17


08:12 7/31/17


11:26 7/31/17


12:06 7/31/17


13:10 Range/Units


 


 


White Blood Count 8.43    4.8-10.8  K/uL


 


Red Blood Count 4.40    4.7-6.1  M/uL


 


Hemoglobin 13.7    14.0-18.0  g/dL


 


Hematocrit 40.6    42-52  %


 


Mean Corpuscular Volume 92.3      fL


 


Mean Corpuscular Hemoglobin 31.1    25-34  pg


 


Mean Corpuscular Hemoglobin


Concent 33.7


  


  


  


  32-36  g/dl


 


 


RDW Standard Deviation 45.0    36.4-46.3  fL


 


RDW Coefficient of Variation 13.4    11.5-14.5  %


 


Platelet Count 196    130-400  K/uL


 


Mean Platelet Volume 9.7    7.4-10.4  fL


 


Sodium Level 140 142   136-145  mmol/L


 


Potassium Level 2.8 2.9  3.0 3.5-5.1  mmol/L


 


Chloride Level 104 105     mmol/L


 


Carbon Dioxide Level 28 29   21-32  mmol/L


 


Anion Gap 8.0 8.0   3-11  mmol/L


 


Blood Urea Nitrogen 8 7   7-18  mg/dl


 


Creatinine


  0.98


  0.95


  


  


  0.60-1.40


mg/dl


 


Est Creatinine Clear Calc


Drug Dose 80.4


  83.0


  


  


   ml/min


 


 


Estimated GFR (


American) 87.1


  90.4


  


  


   


 


 


Estimated GFR (Non-


American 75.1


  78.0


  


  


   


 


 


BUN/Creatinine Ratio 7.9 7.6   10-20  


 


Random Glucose 102 106   70-99  mg/dl


 


Calcium Level 8.1 8.5   8.5-10.1  mg/dl


 


Magnesium Level  1.9   1.8-2.4  mg/dl


 


Bedside Glucose   109  70-99  mg/dl


 


Test


  7/31/17


16:48 7/31/17


18:36 7/31/17


23:59 8/1/17


05:57 Range/Units


 


 


Bedside Glucose 100  151 143 70-99  mg/dl


 


Potassium Level  3.4   3.5-5.1  mmol/L


 


Test


  8/1/17


06:18 


  


  


  Range/Units


 


 


White Blood Count 9.69    4.8-10.8  K/uL


 


Red Blood Count 3.99    4.7-6.1  M/uL


 


Hemoglobin 12.7    14.0-18.0  g/dL


 


Hematocrit 37.1    42-52  %


 


Mean Corpuscular Volume 93.0      fL


 


Mean Corpuscular Hemoglobin 31.8    25-34  pg


 


Mean Corpuscular Hemoglobin


Concent 34.2


  


  


  


  32-36  g/dl


 


 


RDW Standard Deviation 46.3    36.4-46.3  fL


 


RDW Coefficient of Variation 13.7    11.5-14.5  %


 


Platelet Count 240    130-400  K/uL


 


Mean Platelet Volume 9.9    7.4-10.4  fL


 


Sodium Level 143    136-145  mmol/L


 


Potassium Level 3.6    3.5-5.1  mmol/L


 


Chloride Level 111      mmol/L


 


Carbon Dioxide Level 25    21-32  mmol/L


 


Anion Gap 7.0    3-11  mmol/L


 


Blood Urea Nitrogen 9    7-18  mg/dl


 


Creatinine


  0.98


  


  


  


  0.60-1.40


mg/dl


 


Est Creatinine Clear Calc


Drug Dose 80.4


  


  


  


   ml/min


 


 


Estimated GFR (


American) 87.1


  


  


  


   


 


 


Estimated GFR (Non-


American 75.1


  


  


  


   


 


 


BUN/Creatinine Ratio 9.0    10-20  


 


Random Glucose 155    70-99  mg/dl


 


Calcium Level 7.6    8.5-10.1  mg/dl











Assessment & Plan


s/p sigmoid colectomy for volvulus


   will start 1 mg continuous on PCA + scheduled Ofirmev x 24hrs


   UOP adequate


   K+ corrected


   initially high drain output (irrigation) but decreased overnight


   continue NG + vallejo

## 2017-08-01 NOTE — HOSPITALIST PROGRESS NOTE
Hospitalist Progress Note


Date of Service


Aug 1, 2017.


 (Bertha Antonio PA-C)





Subjective


Pt evaluation today including:  conversation w/ patient, physical exam, chart 

review, lab review, review of studies, review of inpatient medication list


Patient seen and evaluated. Verbalized no complaints. States pain is well 

controlled and is hungry.


Weaver catheter in place draining dark urine and had reduce output and will 

continue IVFs.


Tolerating ice chips. Does have a tremor noted. Dressing with dried serosang. 

drainage.


Daughter not at bedside but will update if able later today





   Constitutional:  No fever, No chills


   ENT:  No sore throat, No trouble swallowing


   Respiratory:  No shortness of breath


   Cardiovascular:  No chest pain


   Abdomen:  No pain, No nausea, No vomiting (Bertha Antonio PA-C)





Medications





Current Inpatient Medications








 Medications


  (Trade)  Dose


 Ordered  Sig/Kalpesh


 Route  Start Time


 Stop Time Status Last Admin


Dose Admin


 


 Carbidopa/Levodopa


  (Sinemet 25/


 100MG Tab)  1 tab  TID


 PO  7/27/17 21:00


 8/26/17 20:59  7/31/17 09:34


1 TAB


 


 Lorazepam


  (Ativan Inj)  0.5 mg  Q4H  PRN


 IV  7/27/17 16:00


 8/26/17 15:59   


 


 


 Lorazepam


  (Ativan Inj)  1 mg  Q4H  PRN


 IV  7/27/17 16:00


 8/26/17 15:59   


 


 


 Promethazine HCl


 12.5 mg/Sodium


 Chloride  50.5 ml @ 


 204 mls/hr  Q6H  PRN


 IV  7/27/17 16:00


 8/26/17 15:59   


 


 


 Ondansetron HCl


  (Zofran Inj)  4 mg  Q6H  PRN


 IV  7/27/17 16:00


 8/26/17 15:59  7/28/17 05:47


4 MG


 


 Ranitidine HCl 50


 mg/Dextrose  102 ml @ 


 200 mls/hr  Q8H


 IV  7/27/17 18:00


 8/26/17 17:59  8/1/17 11:52


200 MLS/HR


 


 Insulin Aspart


  (novoLOG ASPART)  SLIDING


 SCALE


 PARAMETER  Q6


 SC  7/28/17 06:00


 8/27/17 05:59   


 


 


 Ciprofloxacin/


 Dextrose 400 mg/


 Prmx  200 ml @ 


 100 mls/hr  Q12H


 IV  7/28/17 16:00


 8/7/17 20:59  8/1/17 09:21


100 MLS/HR


 


 Metronidazole 500


 mg/Prmx  100 ml @ 


 100 mls/hr  Q8H


 IV  7/28/17 18:00


 8/7/17 17:59  8/1/17 11:42


100 MLS/HR


 


 Lorazepam 0.5 mg/


 Syringe  1 ml @ 1


 mls/min  Q4H  PRN


 IV  7/29/17 21:30


 8/28/17 21:29  7/31/17 18:34


1 MLS/MIN


 


 Lorazepam 1 mg/


 Syringe  1 ml @ 1


 mls/min  Q4H  PRN


 IV  7/29/17 21:30


 8/28/17 21:29  7/29/17 23:42


1 MLS/MIN


 


 Albuterol/


 Ipratropium


  (Duoneb)  3 ml  QIDR


 INH  7/30/17 12:00


 8/29/17 11:59  8/1/17 11:19


3 ML


 


 Albuterol/


 Ipratropium


  (Duoneb)  3 ml  Q2R  PRN


 INH  7/30/17 08:15


 8/29/17 08:14   


 


 


 Hydralazine HCl


  (HydrALAZINE INJ)  10 mg  Q6H  PRN


 IV.  7/31/17 08:30


 8/30/17 08:29   


 


 


 Naloxone HCl


  (Narcan Inj)  0.1 mg  Q5M  PRN


 IV  7/31/17 16:30


 8/30/17 16:29   


 


 


 Morphine Sulfate


  (moRPHine


 SULFATE PCA)  50 mg  PRN  PRN


 IV  7/31/17 17:00


 8/14/17 16:59  8/1/17 09:29


50 MG


 


 Sodium Chloride  1,000 ml @ 


 15 mls/hr  Q24H


 IV  7/31/17 17:00


 8/30/17 16:59   


 


 


 Potassium


 Chloride/Sodium


 Chloride  1,000 ml @ 


 150 mls/hr  Q6H40M


 IV  7/31/17 17:00


 8/30/17 16:59  8/1/17 06:36


150 MLS/HR


 


 Acetaminophen


 1000 mg/Empty Bag  100 ml @ 


 400 mls/hr  Q8H


 IV  8/1/17 10:00


 8/2/17 02:14  8/1/17 11:46


400 MLS/HR








 (Bertha Antonio, PA-C)





Objective


Vital Signs











  Date Time  Temp Pulse Resp B/P (MAP) Pulse Ox O2 Delivery O2 Flow Rate FiO2


 


8/1/17 12:33 36.8 84 16 122/66 (84) 94 Nasal Cannula 1.0 


 


8/1/17 11:20  86 15  98 Nasal Cannula 1.0 


 


8/1/17 10:27  96   92   


 


8/1/17 08:09     94 Nasal Cannula 1.0 


 


8/1/17 07:46 36.9 92 18 130/64 (86) 94 Nasal Cannula 1.0 


 


8/1/17 07:40      Nasal Cannula 1.0 


 


8/1/17 07:06  90 16  95 Nasal Cannula 2.0 


 


8/1/17 03:06 37.1 86 16 131/75 (93) 96 Nasal Cannula 2.0 


 


8/1/17 00:05      Nasal Cannula 3.0 


 


7/31/17 22:49 36.7 105 16 151/78 (102) 95 Nasal Cannula 3.0 


 


7/31/17 20:00 37.5 105 18 145/72 (96) 95 Nasal Cannula 3.0 


 


7/31/17 19:00 37.6 106 18 139/76 (97) 94 Nasal Cannula 3.0 


 


7/31/17 18:30      Nasal Cannula 3.0 


 


7/31/17 18:30      Mask 2.0 


 


7/31/17 18:15  87 16  95 Mask 2.0 


 


7/31/17 17:55 37.0 95 18 147/83 (104) 95 Mask 2.0 


 


7/31/17 17:30  88 17 156/81 96 Oxymask 2 


 


7/31/17 17:15  87 18 149/83 96 Oxymask 2 


 


7/31/17 17:05 37.3 86 18 158/84 96 Oxymask 4 


 


7/31/17 16:55  85 17 170/88 97 Oxymask 6 


 


7/31/17 16:45  84 19 162/92 95 Oxymask 8 


 


7/31/17 16:35  86 19 172/95 96 Oxymask 10 


 


7/31/17 16:28 37.0 82 16 167/96 95 Oxymask 10 








 (Bertha Antonio PA-C)





Physical Exam


General Appearance:  WD/WN, no apparent distress


Eyes:  sclerae normal


ENT:  hearing grossly normal


Neck:  supple, no JVD, trachea midline


Respiratory/Chest:  lungs clear, normal breath sounds, no respiratory distress, 

no accessory muscle use


Cardiovascular:  regular rate, rhythm, no gallop, no murmur


Abdomen:  normal bowel sounds, + pertinent finding (dressing intact with dried 

serosang drainage - did not remove dressing)


Neurologic/Psychiatric:  alert


Skin:  normal color, warm/dry


 (Bertha Antonio, PA-C)





Laboratory Results





Last 24 Hours








Test


  7/31/17


16:48 7/31/17


18:36 7/31/17


23:59 8/1/17


05:57


 


Bedside Glucose 100 mg/dl   151 mg/dl  143 mg/dl 


 


Potassium Level  3.4 mmol/L   


 


Test


  8/1/17


06:18 


  


  


 


 


White Blood Count 9.69 K/uL    


 


Red Blood Count 3.99 M/uL    


 


Hemoglobin 12.7 g/dL    


 


Hematocrit 37.1 %    


 


Mean Corpuscular Volume 93.0 fL    


 


Mean Corpuscular Hemoglobin 31.8 pg    


 


Mean Corpuscular Hemoglobin


Concent 34.2 g/dl 


  


  


  


 


 


RDW Standard Deviation 46.3 fL    


 


RDW Coefficient of Variation 13.7 %    


 


Platelet Count 240 K/uL    


 


Mean Platelet Volume 9.9 fL    


 


Sodium Level 143 mmol/L    


 


Potassium Level 3.6 mmol/L    


 


Chloride Level 111 mmol/L    


 


Carbon Dioxide Level 25 mmol/L    


 


Anion Gap 7.0 mmol/L    


 


Blood Urea Nitrogen 9 mg/dl    


 


Creatinine 0.98 mg/dl    


 


Est Creatinine Clear Calc


Drug Dose 80.4 ml/min 


  


  


  


 


 


Estimated GFR (


American) 87.1 


  


  


  


 


 


Estimated GFR (Non-


American 75.1 


  


  


  


 


 


BUN/Creatinine Ratio 9.0    


 


Random Glucose 155 mg/dl    


 


Calcium Level 7.6 mg/dl    








 (Bertha Antonio, PA-C)





Assessment and Plan


This is a 75 yo M with PMHx of T2DM with Diabetic Neuropathy, HTN, Gout, GIULIA on 

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 

anxiety disorder who presents with abdominal pain and bloating since Tuesday. 





Sigmoid Volvulus S/P Decompression 7/28 and 7/29 and S/P Sigmoid Colectomy 7/31:


- Has H/O abdominal surgical removal of polyp and H/O gastroparesis - will 

limit opioids and benzos when possible


- Cipro 400 mg IV BID and Flagyl 500 mg IV Q8H on 7/28 to finish a 10 day course


- Ranitidine 50 mg IV Q8H


- GI and General Surgery following - recommendations reviewed and addressed





T2DM with Diabetic Neuropathy: A1c 5.9 (2/22/17)


- Pt was recently taken off metformin by his PCP - continue to monitor and 

coverage if necessary





Hypokalemia: RESOLVED


- Replete orally and intravenously - continue to monitor and replete as 

necessary





Parkinson's Disease/Restless Leg Syndrome: STABLE


- Gabapentin 300 mg QID and Carbidopa/Levodopa 25/100 mg TID - will allow 

Sinemet with small sips of water





HTN:


- On Triamterene/HCT 37.5/25 - 1/2 tablet daily - will hold at this time due to 

surgical intervention


- Hydralazine PRN





Gout:


- Allopurinol 300 mg daily after advanced diet





GIULIA:


- CPAP at night - allow Ativan IV PRN for claustrophobia with CPAP and anxiety


- Clonazepam and Sertraline 25 mg daily when diet advanced





DVT Prophylaxis: BERENICE/SCDs; withhold chemical prophylaxis for surgical 

intervention





Code Status: FULL RESUSCITATION





Disposition: Patient from home - daughter prefers return home with home PT/OT 

if necessary


- PT/OT evaluations after surgical recovery


Continued [facility] stay due to:  multiple IV medications needed


Discharge planning:  uncertain


 (Bertha Antonio, EMMANUEL)





Reviewed:  Pt Seen/Exam by Me


 (Mar Chandler MD)


History


Physician Assistant Supervision Note:





I interviewed and examined the patient. Discussed with C[place] Antonio and agree with 

findings and plan as documented in the note. Any exceptions or clarifications 

are listed here: 





Pt just got out of bed to chair at the time I saw him and was feeling weak. 

Tremor from PD has become significant since being off Sinemet. NGT removed





Vitals reviewed


NAD,sitting in chair


RRR no mgr


Mild exp wheezes throughout, breathing unlabored


Abd hypoactive BS, soft, NT, dressing over midline incision is c/d/i, CEDRICK drain 

with scant serosang fluid in it


Weaver in palce draining clear yellow urine


Ext: no edema, no calf tenderness, 2+ DP pulses


Neuro: left hand and arm resting tremor, coarse





75 yo male with a h/o PD, DMII, anxiety, HTN, gout, GIULIA on CPAP, here with 

sigmoid volvulus requiring sigmoid colectomy.


-post-op care as per Surgery, CEDRICK drain and adv diet as tolerated


-continue to restart po meds as tolerated-Sinemet today (Sinemet, Dyazide, 

vitamins, Zoloft, clonazepam)


-can give IV ativan prn anxiety in place of clonazepam for now


Severe hypokalemia secondary to poor po intake--> resolved with replacement


-follow post-op CBC, PRP





Documented By:  Mar Chandler


 (Mar Chandler MD)

## 2017-08-02 VITALS
HEART RATE: 104 BPM | SYSTOLIC BLOOD PRESSURE: 145 MMHG | DIASTOLIC BLOOD PRESSURE: 68 MMHG | OXYGEN SATURATION: 92 % | TEMPERATURE: 97.88 F

## 2017-08-02 VITALS
HEART RATE: 105 BPM | DIASTOLIC BLOOD PRESSURE: 81 MMHG | OXYGEN SATURATION: 95 % | SYSTOLIC BLOOD PRESSURE: 159 MMHG | TEMPERATURE: 98.6 F

## 2017-08-02 VITALS
OXYGEN SATURATION: 94 % | TEMPERATURE: 98.6 F | DIASTOLIC BLOOD PRESSURE: 70 MMHG | SYSTOLIC BLOOD PRESSURE: 129 MMHG | HEART RATE: 79 BPM

## 2017-08-02 VITALS
DIASTOLIC BLOOD PRESSURE: 70 MMHG | OXYGEN SATURATION: 93 % | SYSTOLIC BLOOD PRESSURE: 130 MMHG | HEART RATE: 89 BPM | TEMPERATURE: 98.6 F

## 2017-08-02 VITALS
DIASTOLIC BLOOD PRESSURE: 70 MMHG | OXYGEN SATURATION: 92 % | TEMPERATURE: 98.42 F | SYSTOLIC BLOOD PRESSURE: 120 MMHG | HEART RATE: 90 BPM

## 2017-08-02 VITALS — OXYGEN SATURATION: 92 % | HEART RATE: 92 BPM

## 2017-08-02 VITALS — OXYGEN SATURATION: 95 % | HEART RATE: 84 BPM

## 2017-08-02 VITALS — OXYGEN SATURATION: 93 %

## 2017-08-02 VITALS — HEART RATE: 104 BPM | OXYGEN SATURATION: 92 %

## 2017-08-02 VITALS — HEART RATE: 91 BPM | OXYGEN SATURATION: 91 %

## 2017-08-02 LAB
ANION GAP SERPL CALC-SCNC: 2 MMOL/L (ref 3–11)
BUN SERPL-MCNC: 11 MG/DL (ref 7–18)
BUN/CREAT SERPL: 12 (ref 10–20)
CALCIUM SERPL-MCNC: 7.5 MG/DL (ref 8.5–10.1)
CHLORIDE SERPL-SCNC: 115 MMOL/L (ref 98–107)
CO2 SERPL-SCNC: 26 MMOL/L (ref 21–32)
CREAT CL PREDICTED SERPL C-G-VRATE: 83.9 ML/MIN
CREAT SERPL-MCNC: 0.94 MG/DL (ref 0.6–1.4)
EOSINOPHIL NFR BLD AUTO: 202 K/UL (ref 130–400)
GLUCOSE SERPL-MCNC: 115 MG/DL (ref 70–99)
HCT VFR BLD CALC: 33.4 % (ref 42–52)
MCH RBC QN AUTO: 30.8 PG (ref 25–34)
MCHC RBC AUTO-ENTMCNC: 32.3 G/DL (ref 32–36)
MCV RBC AUTO: 95.2 FL (ref 80–100)
PMV BLD AUTO: 9.3 FL (ref 7.4–10.4)
POTASSIUM SERPL-SCNC: 3.8 MMOL/L (ref 3.5–5.1)
RBC # BLD AUTO: 3.51 M/UL (ref 4.7–6.1)
SODIUM SERPL-SCNC: 143 MMOL/L (ref 136–145)
WBC # BLD AUTO: 11.29 K/UL (ref 4.8–10.8)

## 2017-08-02 RX ADMIN — CARBIDOPA AND LEVODOPA SCH TAB: 25; 100 TABLET ORAL at 20:48

## 2017-08-02 RX ADMIN — INSULIN ASPART SCH UNITS: 100 INJECTION, SOLUTION INTRAVENOUS; SUBCUTANEOUS at 12:00

## 2017-08-02 RX ADMIN — SODIUM CHLORIDE AND POTASSIUM CHLORIDE SCH MLS/HR: 9; 1.49 INJECTION, SOLUTION INTRAVENOUS at 09:02

## 2017-08-02 RX ADMIN — GABAPENTIN SCH MG: 300 CAPSULE ORAL at 16:52

## 2017-08-02 RX ADMIN — GABAPENTIN SCH MG: 300 CAPSULE ORAL at 20:48

## 2017-08-02 RX ADMIN — CIPROFLOXACIN SCH MLS/HR: 2 INJECTION, SOLUTION INTRAVENOUS at 19:43

## 2017-08-02 RX ADMIN — MORPHINE SULFATE PRN MG: 50 INJECTION, SOLUTION, CONCENTRATE INTRAVENOUS at 15:16

## 2017-08-02 RX ADMIN — CARBIDOPA AND LEVODOPA SCH TAB: 25; 100 TABLET ORAL at 14:12

## 2017-08-02 RX ADMIN — ACETAMINOPHEN SCH MLS/HR: 10 INJECTION, SOLUTION INTRAVENOUS at 01:42

## 2017-08-02 RX ADMIN — INSULIN ASPART SCH UNITS: 100 INJECTION, SOLUTION INTRAVENOUS; SUBCUTANEOUS at 18:00

## 2017-08-02 RX ADMIN — LORAZEPAM PRN MLS/MIN: 2 INJECTION INTRAMUSCULAR; INTRAVENOUS at 10:51

## 2017-08-02 RX ADMIN — INSULIN ASPART SCH UNITS: 100 INJECTION, SOLUTION INTRAVENOUS; SUBCUTANEOUS at 05:50

## 2017-08-02 RX ADMIN — SODIUM CHLORIDE SCH MLS/HR: 900 INJECTION, SOLUTION INTRAVENOUS at 17:45

## 2017-08-02 RX ADMIN — RANITIDINE SCH MG: 150 TABLET ORAL at 20:49

## 2017-08-02 RX ADMIN — CIPROFLOXACIN SCH MLS/HR: 2 INJECTION, SOLUTION INTRAVENOUS at 09:01

## 2017-08-02 RX ADMIN — CLONAZEPAM SCH MG: 0.5 TABLET ORAL at 20:47

## 2017-08-02 RX ADMIN — IPRATROPIUM BROMIDE AND ALBUTEROL SULFATE SCH ML: .5; 3 SOLUTION RESPIRATORY (INHALATION) at 07:02

## 2017-08-02 RX ADMIN — METRONIDAZOLE SCH MLS/HR: 500 INJECTION, SOLUTION INTRAVENOUS at 17:44

## 2017-08-02 RX ADMIN — CARBIDOPA AND LEVODOPA SCH TAB: 25; 100 TABLET ORAL at 09:02

## 2017-08-02 RX ADMIN — MORPHINE SULFATE PRN MG: 50 INJECTION, SOLUTION, CONCENTRATE INTRAVENOUS at 07:08

## 2017-08-02 RX ADMIN — METRONIDAZOLE SCH MLS/HR: 500 INJECTION, SOLUTION INTRAVENOUS at 11:21

## 2017-08-02 RX ADMIN — SODIUM CHLORIDE SCH MLS/HR: 900 INJECTION, SOLUTION INTRAVENOUS at 16:52

## 2017-08-02 RX ADMIN — RANITIDINE HYDROCHLORIDE SCH MLS/HR: 25 INJECTION, SOLUTION INTRAMUSCULAR; INTRAVENOUS at 11:21

## 2017-08-02 RX ADMIN — MORPHINE SULFATE PRN MG: 50 INJECTION, SOLUTION, CONCENTRATE INTRAVENOUS at 22:47

## 2017-08-02 RX ADMIN — MORPHINE SULFATE PRN MG: 50 INJECTION, SOLUTION, CONCENTRATE INTRAVENOUS at 18:25

## 2017-08-02 RX ADMIN — SODIUM CHLORIDE AND POTASSIUM CHLORIDE SCH MLS/HR: 9; 1.49 INJECTION, SOLUTION INTRAVENOUS at 01:41

## 2017-08-02 RX ADMIN — IPRATROPIUM BROMIDE AND ALBUTEROL SULFATE SCH ML: .5; 3 SOLUTION RESPIRATORY (INHALATION) at 20:05

## 2017-08-02 RX ADMIN — METRONIDAZOLE SCH MLS/HR: 500 INJECTION, SOLUTION INTRAVENOUS at 01:41

## 2017-08-02 RX ADMIN — SODIUM CHLORIDE AND POTASSIUM CHLORIDE SCH MLS/HR: 9; 1.49 INJECTION, SOLUTION INTRAVENOUS at 15:49

## 2017-08-02 RX ADMIN — RANITIDINE HYDROCHLORIDE SCH MLS/HR: 25 INJECTION, SOLUTION INTRAMUSCULAR; INTRAVENOUS at 01:41

## 2017-08-02 RX ADMIN — IPRATROPIUM BROMIDE AND ALBUTEROL SULFATE SCH ML: .5; 3 SOLUTION RESPIRATORY (INHALATION) at 15:32

## 2017-08-02 RX ADMIN — IPRATROPIUM BROMIDE AND ALBUTEROL SULFATE SCH ML: .5; 3 SOLUTION RESPIRATORY (INHALATION) at 11:08

## 2017-08-02 NOTE — HOSPITALIST PROGRESS NOTE
Hospitalist Progress Note


Date of Service


Aug 2, 2017.


 (Bertha Antonio PA-C)





Subjective


Pt evaluation today including:  conversation w/ patient, conversation w/ family

, physical exam, chart review, lab review, review of studies, review of 

inpatient medication list


Patient seen and evaluated. No acute events overnight. Patient received ativan 

prior to exam and is more drowsy than normal but verbalizes no complaints.


Per son at bedside, surgery ok with advancing to clear liquids and will watch 

for order. Will reintroduce oral medications 


Nursing staff reporting more prevalent tremors which seem to be largely calm at 

this time but he is more lethargic. 


Weaver draining dark urine. Urine output improving.





   Additional Comments:


ROS largely deferred due to lethargy. Verbalized no pain. Reports hunger.


 (Betrha Antonio PA-C)





Medications





Current Inpatient Medications








 Medications


  (Trade)  Dose


 Ordered  Sig/Kalpesh


 Route  Start Time


 Stop Time Status Last Admin


Dose Admin


 


 Carbidopa/Levodopa


  (Sinemet 25/


 100MG Tab)  1 tab  TID


 PO  7/27/17 21:00


 8/26/17 20:59  8/2/17 09:02


1 TAB


 


 Lorazepam


  (Ativan Inj)  0.5 mg  Q4H  PRN


 IV  7/27/17 16:00


 8/26/17 15:59   


 


 


 Lorazepam


  (Ativan Inj)  1 mg  Q4H  PRN


 IV  7/27/17 16:00


 8/26/17 15:59   


 


 


 Promethazine HCl


 12.5 mg/Sodium


 Chloride  50.5 ml @ 


 204 mls/hr  Q6H  PRN


 IV  7/27/17 16:00


 8/26/17 15:59   


 


 


 Ondansetron HCl


  (Zofran Inj)  4 mg  Q6H  PRN


 IV  7/27/17 16:00


 8/26/17 15:59  7/28/17 05:47


4 MG


 


 Ranitidine HCl 50


 mg/Dextrose  102 ml @ 


 200 mls/hr  Q8H


 IV  7/27/17 18:00


 8/26/17 17:59  8/2/17 11:21


200 MLS/HR


 


 Insulin Aspart


  (novoLOG ASPART)  SLIDING


 SCALE


 PARAMETER  Q6


 SC  7/28/17 06:00


 8/27/17 05:59   


 


 


 Ciprofloxacin/


 Dextrose 400 mg/


 Prmx  200 ml @ 


 100 mls/hr  Q12H


 IV  7/28/17 16:00


 8/7/17 20:59  8/2/17 09:01


100 MLS/HR


 


 Metronidazole 500


 mg/Prmx  100 ml @ 


 100 mls/hr  Q8H


 IV  7/28/17 18:00


 8/7/17 17:59  8/2/17 11:21


100 MLS/HR


 


 Lorazepam 0.5 mg/


 Syringe  1 ml @ 1


 mls/min  Q4H  PRN


 IV  7/29/17 21:30


 8/28/17 21:29  7/31/17 18:34


1 MLS/MIN


 


 Lorazepam 1 mg/


 Syringe  1 ml @ 1


 mls/min  Q4H  PRN


 IV  7/29/17 21:30


 8/28/17 21:29  8/2/17 10:51


1 MLS/MIN


 


 Albuterol/


 Ipratropium


  (Duoneb)  3 ml  QIDR


 INH  7/30/17 12:00


 8/29/17 11:59  8/2/17 11:08


3 ML


 


 Albuterol/


 Ipratropium


  (Duoneb)  3 ml  Q2R  PRN


 INH  7/30/17 08:15


 8/29/17 08:14   


 


 


 Hydralazine HCl


  (HydrALAZINE INJ)  10 mg  Q6H  PRN


 IV.  7/31/17 08:30


 8/30/17 08:29   


 


 


 Naloxone HCl


  (Narcan Inj)  0.1 mg  Q5M  PRN


 IV  7/31/17 16:30


 8/30/17 16:29   


 


 


 Morphine Sulfate


  (moRPHine


 SULFATE PCA)  50 mg  PRN  PRN


 IV  7/31/17 17:00


 8/14/17 16:59  8/2/17 07:08


50 MG


 


 Sodium Chloride  1,000 ml @ 


 15 mls/hr  Q24H


 IV  7/31/17 17:00


 8/30/17 16:59   


 


 


 Potassium


 Chloride/Sodium


 Chloride  1,000 ml @ 


 150 mls/hr  Q6H40M


 IV  7/31/17 17:00


 8/30/17 16:59  8/2/17 09:02


150 MLS/HR








 (Bertha Antonio, EMMANUEL)





Objective


Vital Signs











  Date Time  Temp Pulse Resp B/P (MAP) Pulse Ox O2 Delivery O2 Flow Rate FiO2


 


8/2/17 12:00 36.9 90 12 120/70 (87) 92 Room Air  


 


8/2/17 11:10  92 14  92 Room Air  


 


8/2/17 08:07     93 Room Air  


 


8/2/17 08:00      Room Air  


 


8/2/17 08:00 37.0 89 14 130/70 (90) 93 Room Air  


 


8/2/17 07:04  84 16  95 Nasal Cannula 2.0 


 


8/2/17 03:22 37.0 79 22 129/70 (89) 94 Nasal Cannula 2.0 


 


8/1/17 23:50      Nasal Cannula 2.0 


 


8/1/17 22:52 36.8 84 16 121/67 (85) 93 Nasal Cannula 2.0 


 


8/1/17 19:29  83 16  96 Nasal Cannula 2.0 


 


8/1/17 15:38  79 16  95 Room Air  


 


8/1/17 15:15      Room Air  


 


8/1/17 15:12 36.9 81 20 128/68 (88)  Nasal Cannula 1.5 








 (Bertha Antonio PA-C)





Physical Exam


General Appearance:  WD/WN, no apparent distress


Neck:  supple, no JVD, trachea midline


Respiratory/Chest:  lungs clear, normal breath sounds, no respiratory distress, 

no accessory muscle use


Cardiovascular:  regular rate, rhythm, no gallop, no murmur


Abdomen:  normal bowel sounds


Neurologic/Psychiatric:  + pertinent finding (lethargic but opens eyes to touch 

but falls back to sleep easily)


Skin:  normal color, warm/dry


 (Bertha Antonio, PA-C)





Laboratory Results





Last 24 Hours








Test


  8/1/17


18:34 8/1/17


23:51 8/2/17


05:34 8/2/17


05:50


 


Bedside Glucose 118 mg/dl  118 mg/dl  94 mg/dl  106 mg/dl 


 


Test


  8/2/17


10:15 8/2/17


11:51 


  


 


 


White Blood Count 11.29 K/uL    


 


Red Blood Count 3.51 M/uL    


 


Hemoglobin 10.8 g/dL    


 


Hematocrit 33.4 %    


 


Mean Corpuscular Volume 95.2 fL    


 


Mean Corpuscular Hemoglobin 30.8 pg    


 


Mean Corpuscular Hemoglobin


Concent 32.3 g/dl 


  


  


  


 


 


RDW Standard Deviation 48.8 fL    


 


RDW Coefficient of Variation 14.3 %    


 


Platelet Count 202 K/uL    


 


Mean Platelet Volume 9.3 fL    


 


Sodium Level 143 mmol/L    


 


Potassium Level 3.8 mmol/L    


 


Chloride Level 115 mmol/L    


 


Carbon Dioxide Level 26 mmol/L    


 


Anion Gap 2.0 mmol/L    


 


Blood Urea Nitrogen 11 mg/dl    


 


Creatinine 0.94 mg/dl    


 


Est Creatinine Clear Calc


Drug Dose 83.9 ml/min 


  


  


  


 


 


Estimated GFR (


American) 91.6 


  


  


  


 


 


Estimated GFR (Non-


American 79.0 


  


  


  


 


 


BUN/Creatinine Ratio 12.0    


 


Random Glucose 115 mg/dl    


 


Calcium Level 7.5 mg/dl    


 


Bedside Glucose  113 mg/dl   








 (Bertha Antonio, PADestineyC)





Assessment and Plan


This is a 75 yo M with PMHx of T2DM with Diabetic Neuropathy, HTN, Gout, GIULIA on 

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 

anxiety disorder who presents with abdominal pain and bloating since Tuesday. 





Sigmoid Volvulus S/P Decompression 7/28 and 7/29 and S/P Sigmoid Colectomy 7/31:


- Has H/O abdominal surgical removal of polyp and H/O gastroparesis - will 

limit opioids and benzos when possible


- Cipro 400 mg IV BID and Flagyl 500 mg IV Q8H on 7/28 to finish a 10 day 

course - DAY #6


- Ranitidine 150 mg BID


- GI and General Surgery following - recommendations reviewed and addressed





T2DM with Diabetic Neuropathy: A1c 5.9 (2/22/17)


- Pt was recently taken off metformin by his PCP - continue to monitor and 

coverage if necessary





Hypokalemia: RESOLVED


- Continue to monitor and replete as necessary





Parkinson's Disease/Restless Leg Syndrome: STABLE


- Gabapentin 300 mg QID and Carbidopa/Levodopa 25/100 mg TID





HTN:


- On Triamterene/HCT 37.5/25 - 1/2 tablet daily - will hold at this time due to 

surgical intervention


- Hydralazine PRN





Gout:


- Allopurinol 300 mg daily - will hold at this time





GIULIA:


- CPAP at night - allow Ativan IV PRN for claustrophobia with CPAP and anxiety


- Clonazepam and Sertraline 25 mg daily





DVT Prophylaxis: BERENICE/SCDs; withhold chemical prophylaxis for surgical 

intervention





Code Status: FULL RESUSCITATION





Disposition: Patient from home - daughter prefers return home with home PT/OT 

if necessary


- PT/OT evaluations - given weakness/surgical intervention/tremors - unsure of 

the ability for home PT/OT services


   -- Anticipate advancement of diet and hopeful wean from PCA - possible stay 3

-4 days


Continued [facility] stay due to:  multiple IV medications needed


Discharge planning:  uncertain


 (Bertha Antonio, PA-C)





Reviewed:  Pt Seen/Exam by Me


 (Mar Chandler MD)


History


Physician Assistant Supervision Note:





I interviewed and examined the patient. Discussed with [place] Antonio and agree with 

findings and plan as documented in the note. Any exceptions or clarifications 

are listed here: 





Pt received IV ativan today at daughter's request this AM as he was getting 

agitated. He is also on morphine PCA, lethargic and daughter reports "not 

acting himself" currently.


IV site in hand is leaking and IV team here to change site. ALso with tea 

colored urine and low normal UOP. +10L this admission





Vitals reviewed


NAD,sitting in bed


RRR no mgr


Mild exp wheezes throughout, breathing unlabored, some faint crackles at bases


Abd hypoactive BS, soft, NT, dressing over midline incision is c/d/i, CEDRICK drain 

with scant serosang fluid in it


Weaver in palce draining clear tea-colored urine


Ext: 1+ pitting edema bilat legs, no calf tenderness, 2+ DP pulses; hands and 

arms with trace edema


Neuro: no further tremors in extremities, is more lethargic today but answers 

questions and is awake





75 yo male with a h/o PD, DMII, anxiety, HTN, gout, GIULIA on CPAP, here with 

sigmoid volvulus requiring sigmoid colectomy.


Lethargy today most likely secondary to IV ativan and PCA pump with morphine. 

DOes have slight leukocytosis today but afebrile.


-post-op care as per Surgery, CEDRICK drain and adv diet as tolerated to clears today


-restart all po meds 


-increase Sinemet to home dose which is actually 1.5 tabs tid


-no further IV ativan prn anxiety, will restart home clonazepam qhs


-fluid overloaded--> stop IVFs as is now taking clears, give lasix 20mg IV x 1 

now for decreased UOP and edema





Documented By:  Mar Chandler


 (Mar Chandler MD)

## 2017-08-03 VITALS
HEART RATE: 88 BPM | OXYGEN SATURATION: 94 % | DIASTOLIC BLOOD PRESSURE: 70 MMHG | TEMPERATURE: 98.78 F | SYSTOLIC BLOOD PRESSURE: 130 MMHG

## 2017-08-03 VITALS
SYSTOLIC BLOOD PRESSURE: 174 MMHG | HEART RATE: 98 BPM | TEMPERATURE: 98.96 F | DIASTOLIC BLOOD PRESSURE: 72 MMHG | OXYGEN SATURATION: 90 %

## 2017-08-03 VITALS — OXYGEN SATURATION: 90 % | HEART RATE: 101 BPM

## 2017-08-03 VITALS
OXYGEN SATURATION: 93 % | HEART RATE: 73 BPM | DIASTOLIC BLOOD PRESSURE: 70 MMHG | SYSTOLIC BLOOD PRESSURE: 137 MMHG | TEMPERATURE: 98.6 F

## 2017-08-03 VITALS
HEART RATE: 91 BPM | OXYGEN SATURATION: 96 % | DIASTOLIC BLOOD PRESSURE: 82 MMHG | SYSTOLIC BLOOD PRESSURE: 151 MMHG | TEMPERATURE: 99.32 F

## 2017-08-03 VITALS — HEART RATE: 91 BPM | OXYGEN SATURATION: 95 %

## 2017-08-03 VITALS — DIASTOLIC BLOOD PRESSURE: 68 MMHG | SYSTOLIC BLOOD PRESSURE: 120 MMHG

## 2017-08-03 VITALS
HEART RATE: 93 BPM | SYSTOLIC BLOOD PRESSURE: 168 MMHG | TEMPERATURE: 98.78 F | DIASTOLIC BLOOD PRESSURE: 78 MMHG | OXYGEN SATURATION: 92 %

## 2017-08-03 VITALS — OXYGEN SATURATION: 90 %

## 2017-08-03 VITALS — HEART RATE: 73 BPM | OXYGEN SATURATION: 94 %

## 2017-08-03 VITALS — DIASTOLIC BLOOD PRESSURE: 79 MMHG | SYSTOLIC BLOOD PRESSURE: 150 MMHG

## 2017-08-03 VITALS — HEART RATE: 98 BPM | OXYGEN SATURATION: 95 %

## 2017-08-03 LAB
ANION GAP SERPL CALC-SCNC: 8 MMOL/L (ref 3–11)
APPEARANCE UR: CLEAR
BILIRUB UR-MCNC: (no result) MG/DL
BUN SERPL-MCNC: 10 MG/DL (ref 7–18)
BUN/CREAT SERPL: 10.1 (ref 10–20)
CALCIUM SERPL-MCNC: 8.3 MG/DL (ref 8.5–10.1)
CHLORIDE SERPL-SCNC: 108 MMOL/L (ref 98–107)
CO2 SERPL-SCNC: 27 MMOL/L (ref 21–32)
COLOR UR: YELLOW
CREAT CL PREDICTED SERPL C-G-VRATE: 82.1 ML/MIN
CREAT SERPL-MCNC: 0.96 MG/DL (ref 0.6–1.4)
EOSINOPHIL NFR BLD AUTO: 263 K/UL (ref 130–400)
GLUCOSE SERPL-MCNC: 103 MG/DL (ref 70–99)
HCT VFR BLD CALC: 35.7 % (ref 42–52)
MANUAL MICROSCOPIC REQUIRED?: NO
MCH RBC QN AUTO: 29.5 PG (ref 25–34)
MCHC RBC AUTO-ENTMCNC: 31.1 G/DL (ref 32–36)
MCV RBC AUTO: 94.9 FL (ref 80–100)
NITRITE UR QL STRIP: (no result)
PH UR STRIP: 5 [PH] (ref 4.5–7.5)
PMV BLD AUTO: 9.4 FL (ref 7.4–10.4)
POTASSIUM SERPL-SCNC: 3.3 MMOL/L (ref 3.5–5.1)
RBC # BLD AUTO: 3.76 M/UL (ref 4.7–6.1)
REVIEW REQ?: YES
SODIUM SERPL-SCNC: 143 MMOL/L (ref 136–145)
SP GR UR STRIP: 1.02 (ref 1–1.03)
URINE EPITHELIAL CELL AUTO: (no result) /LPF (ref 0–5)
UROBILINOGEN UR-MCNC: (no result) MG/DL
WBC # BLD AUTO: 12.37 K/UL (ref 4.8–10.8)
ZZURINE CULT IF INDIC CATH: NO

## 2017-08-03 RX ADMIN — SODIUM CHLORIDE SCH MLS/HR: 900 INJECTION, SOLUTION INTRAVENOUS at 17:10

## 2017-08-03 RX ADMIN — CIPROFLOXACIN SCH MLS/HR: 2 INJECTION, SOLUTION INTRAVENOUS at 19:50

## 2017-08-03 RX ADMIN — CLONAZEPAM SCH MG: 0.5 TABLET ORAL at 19:49

## 2017-08-03 RX ADMIN — METRONIDAZOLE SCH MLS/HR: 500 INJECTION, SOLUTION INTRAVENOUS at 01:32

## 2017-08-03 RX ADMIN — ONDANSETRON PRN MG: 2 INJECTION INTRAMUSCULAR; INTRAVENOUS at 21:32

## 2017-08-03 RX ADMIN — GABAPENTIN SCH MG: 300 CAPSULE ORAL at 21:09

## 2017-08-03 RX ADMIN — MORPHINE SULFATE PRN MG: 50 INJECTION, SOLUTION, CONCENTRATE INTRAVENOUS at 23:10

## 2017-08-03 RX ADMIN — METRONIDAZOLE SCH MLS/HR: 500 INJECTION, SOLUTION INTRAVENOUS at 09:39

## 2017-08-03 RX ADMIN — ONDANSETRON PRN MG: 2 INJECTION INTRAMUSCULAR; INTRAVENOUS at 04:46

## 2017-08-03 RX ADMIN — MORPHINE SULFATE PRN MG: 50 INJECTION, SOLUTION, CONCENTRATE INTRAVENOUS at 07:00

## 2017-08-03 RX ADMIN — IPRATROPIUM BROMIDE AND ALBUTEROL SULFATE SCH ML: .5; 3 SOLUTION RESPIRATORY (INHALATION) at 06:57

## 2017-08-03 RX ADMIN — RANITIDINE SCH MG: 150 TABLET ORAL at 21:09

## 2017-08-03 RX ADMIN — INSULIN ASPART SCH UNITS: 100 INJECTION, SOLUTION INTRAVENOUS; SUBCUTANEOUS at 00:00

## 2017-08-03 RX ADMIN — INSULIN ASPART SCH UNITS: 100 INJECTION, SOLUTION INTRAVENOUS; SUBCUTANEOUS at 17:15

## 2017-08-03 RX ADMIN — IPRATROPIUM BROMIDE AND ALBUTEROL SULFATE SCH ML: .5; 3 SOLUTION RESPIRATORY (INHALATION) at 15:21

## 2017-08-03 RX ADMIN — MORPHINE SULFATE PRN MG: 50 INJECTION, SOLUTION, CONCENTRATE INTRAVENOUS at 00:49

## 2017-08-03 RX ADMIN — INSULIN ASPART SCH UNITS: 100 INJECTION, SOLUTION INTRAVENOUS; SUBCUTANEOUS at 21:00

## 2017-08-03 RX ADMIN — IPRATROPIUM BROMIDE AND ALBUTEROL SULFATE SCH ML: .5; 3 SOLUTION RESPIRATORY (INHALATION) at 20:58

## 2017-08-03 RX ADMIN — GABAPENTIN SCH MG: 300 CAPSULE ORAL at 12:40

## 2017-08-03 RX ADMIN — MORPHINE SULFATE PRN MG: 50 INJECTION, SOLUTION, CONCENTRATE INTRAVENOUS at 15:07

## 2017-08-03 RX ADMIN — METRONIDAZOLE SCH MLS/HR: 500 INJECTION, SOLUTION INTRAVENOUS at 17:41

## 2017-08-03 RX ADMIN — RANITIDINE SCH MG: 150 TABLET ORAL at 07:27

## 2017-08-03 RX ADMIN — SERTRALINE HYDROCHLORIDE SCH MG: 50 TABLET, FILM COATED ORAL at 07:28

## 2017-08-03 RX ADMIN — INSULIN ASPART SCH UNITS: 100 INJECTION, SOLUTION INTRAVENOUS; SUBCUTANEOUS at 12:00

## 2017-08-03 RX ADMIN — CARBIDOPA AND LEVODOPA SCH TAB: 25; 100 TABLET ORAL at 14:29

## 2017-08-03 RX ADMIN — GABAPENTIN SCH MG: 300 CAPSULE ORAL at 07:27

## 2017-08-03 RX ADMIN — INSULIN ASPART SCH UNITS: 100 INJECTION, SOLUTION INTRAVENOUS; SUBCUTANEOUS at 06:00

## 2017-08-03 RX ADMIN — CARBIDOPA AND LEVODOPA SCH TAB: 25; 100 TABLET ORAL at 09:40

## 2017-08-03 RX ADMIN — CIPROFLOXACIN SCH MLS/HR: 2 INJECTION, SOLUTION INTRAVENOUS at 07:27

## 2017-08-03 RX ADMIN — IPRATROPIUM BROMIDE AND ALBUTEROL SULFATE SCH ML: .5; 3 SOLUTION RESPIRATORY (INHALATION) at 11:02

## 2017-08-03 RX ADMIN — CARBIDOPA AND LEVODOPA SCH TAB: 25; 100 TABLET ORAL at 21:08

## 2017-08-03 RX ADMIN — GABAPENTIN SCH MG: 300 CAPSULE ORAL at 17:10

## 2017-08-03 NOTE — SURGERY PROGRESS NOTE
Surgery Progress Note


Date of Service


Aug 3, 2017.





Subjective


Post OP Day:  3


pt feeling ok... no nausea. no bm yet.  denies pain.





Objective


Vital Signs:











  Date Time  Temp Pulse Resp B/P (MAP) Pulse Ox O2 Delivery O2 Flow Rate FiO2


 


8/3/17 12:00 37.1 88 14 130/70 (90) 94 Room Air  


 


8/3/17 12:00 37.1 88 14 130/70 (90) 94 Room Air  


 


8/3/17 11:03  98 16  95 Room Air  


 


8/3/17 10:11    120/68 (85)    


 


8/3/17 07:46     90 Room Air  


 


8/3/17 07:45 37.2 98 16 174/72 (106) 90 Room Air  


 


8/3/17 07:30      Room Air  


 


8/3/17 07:00  101 18  90 Room Air  


 


8/3/17 05:41    150/79 (102)    


 


8/3/17 03:00 37.1 93 16 168/78 (108) 92 Room Air  


 


8/3/17 00:03      CPAP  


 


8/2/17 23:00 37.0 105 18 159/81 (107) 95 CPAP  


 


8/2/17 20:05  104 18  92 Room Air  


 


8/2/17 15:50 36.6 104 18 145/68 (93) 92 Room Air  


 


8/2/17 15:32  91 18  91 Room Air  


 


8/2/17 15:15      Room Air  








General Appearance:  no apparent distress


Abdomen:  non distended, soft


Incision(s):  dry, intact


Laboratory Results:





Results Past 24 Hours








Test


  8/2/17


18:15 8/2/17


23:57 8/3/17


06:01 8/3/17


07:36 Range/Units


 


 


Bedside Glucose 108 108 97  70-99  mg/dl


 


White Blood Count    12.37 4.8-10.8  K/uL


 


Red Blood Count    3.76 4.7-6.1  M/uL


 


Hemoglobin    11.1 14.0-18.0  g/dL


 


Hematocrit    35.7 42-52  %


 


Mean Corpuscular Volume    94.9   fL


 


Mean Corpuscular Hemoglobin    29.5 25-34  pg


 


Mean Corpuscular Hemoglobin


Concent 


  


  


  31.1


  32-36  g/dl


 


 


RDW Standard Deviation    48.4 36.4-46.3  fL


 


RDW Coefficient of Variation    14.1 11.5-14.5  %


 


Platelet Count    263 130-400  K/uL


 


Mean Platelet Volume    9.4 7.4-10.4  fL


 


Sodium Level    143 136-145  mmol/L


 


Potassium Level    3.3 3.5-5.1  mmol/L


 


Chloride Level    108   mmol/L


 


Carbon Dioxide Level    27 21-32  mmol/L


 


Anion Gap    8.0 3-11  mmol/L


 


Blood Urea Nitrogen    10 7-18  mg/dl


 


Creatinine


  


  


  


  0.96


  0.60-1.40


mg/dl


 


Est Creatinine Clear Calc


Drug Dose 


  


  


  82.1


   ml/min


 


 


Estimated GFR (


American) 


  


  


  89.3


   


 


 


Estimated GFR (Non-


American 


  


  


  77.0


   


 


 


BUN/Creatinine Ratio    10.1 10-20  


 


Random Glucose    103 70-99  mg/dl


 


Calcium Level    8.3 8.5-10.1  mg/dl


 


Magnesium Level    1.9 1.8-2.4  mg/dl


 


Test


  8/3/17


12:08 


  


  


  Range/Units


 


 


Bedside Glucose 128    70-99  mg/dl











Assessment & Plan


8/3/27


doing well clinically


awaiting bowel fx


Bebeto serous


may increase to full liquids


follow wbc








8/1/17


sigmoid volvulus


   partially decompressed after endoscopy x2 and rectal tube placement


   high likelihood of recurrence once tube is removed


   will proceed with sigmoid colectomy this afternoon





as above. discussed with pt and his daughter. discussed options/risks. 

discussed high likelihood of recurrence.  discussed surgical risks ( bleeding/

infection/dvt/pe/mi/leaks/injury to other organs such as ureter/bladder/small 

bowel etc...).  answered questions. will proceed with open sigmoidectomy this 

afternoon.


sigmoid volvulus


   partially decompressed after endoscopy x2 and rectal tube placement


   high likelihood of recurrence once tube is removed


   will proceed with sigmoid colectomy this afternoon





as above. discussed with pt and his daughter. discussed options/risks. 

discussed high likelihood of recurrence.  discussed surgical risks ( bleeding/

infection/dvt/pe/mi/leaks/injury to other organs such as ureter/bladder/small 

bowel etc...).  answered questions. will proceed with open sigmoidectomy this 

afternoon.

## 2017-08-03 NOTE — HOSPITALIST PROGRESS NOTE
Hospitalist Progress Note


Date of Service


Aug 3, 2017.


 (Bertha Antonio, PA-C)





Subjective


Pt evaluation today including:  conversation w/ patient, conversation w/ family

, physical exam, chart review, lab review, review of studies, review of 

inpatient medication list


Voiding:  vallejo catheter in place


Patient seen and evaluated. Sitting in bedside chair and verbalizes no 

complaints.


He is still somewhat lethargic today but improved compared to yesterday. Does 

have eyes open, follows commands, and can answer simple questions but easily 

falls back to sleep.


Vallejo catheter remains with dark urine. UO improved with Lasix yesterday but 

still +9000 fluid balance.


Updated son at bedside and answered any questions. Did mention that the patient 

may be better served at an inpatient rehab when medically cleared for discharge.





   Constitutional:  No fever, No chills


   Respiratory:  No cough, No shortness of breath


   Cardiovascular:  No chest pain


   Abdomen:  No pain, No nausea, No vomiting, No diarrhea, No constipation


   Heme:  No abnormal bleeding/bruising (Bertha Antonio, PA-C)





Medications





Current Inpatient Medications








 Medications


  (Trade)  Dose


 Ordered  Sig/Kalpesh


 Route  Start Time


 Stop Time Status Last Admin


Dose Admin


 


 Promethazine HCl


 12.5 mg/Sodium


 Chloride  50.5 ml @ 


 204 mls/hr  Q6H  PRN


 IV  7/27/17 16:00


 8/26/17 15:59   


 


 


 Ondansetron HCl


  (Zofran Inj)  4 mg  Q6H  PRN


 IV  7/27/17 16:00


 8/26/17 15:59  8/3/17 04:46


4 MG


 


 Insulin Aspart


  (novoLOG ASPART)  SLIDING


 SCALE


 PARAMETER  Q6


 SC  7/28/17 06:00


 8/27/17 05:59   


 


 


 Ciprofloxacin/


 Dextrose 400 mg/


 Prmx  200 ml @ 


 100 mls/hr  Q12H


 IV  7/28/17 16:00


 8/7/17 20:59  8/3/17 07:27


100 MLS/HR


 


 Metronidazole 500


 mg/Prmx  100 ml @ 


 100 mls/hr  Q8H


 IV  7/28/17 18:00


 8/7/17 17:59  8/3/17 09:39


100 MLS/HR


 


 Albuterol/


 Ipratropium


  (Duoneb)  3 ml  QIDR


 INH  7/30/17 12:00


 8/29/17 11:59  8/3/17 11:02


3 ML


 


 Albuterol/


 Ipratropium


  (Duoneb)  3 ml  Q2R  PRN


 INH  7/30/17 08:15


 8/29/17 08:14   


 


 


 Hydralazine HCl


  (HydrALAZINE INJ)  10 mg  Q6H  PRN


 IV.  7/31/17 08:30


 8/30/17 08:29  8/3/17 03:48


10 MG


 


 Naloxone HCl


  (Narcan Inj)  0.1 mg  Q5M  PRN


 IV  7/31/17 16:30


 8/30/17 16:29   


 


 


 Morphine Sulfate


  (moRPHine


 SULFATE PCA)  50 mg  PRN  PRN


 IV  7/31/17 17:00


 8/14/17 16:59  8/3/17 15:07


50 MG


 


 Sodium Chloride  1,000 ml @ 


 15 mls/hr  Q24H


 IV  7/31/17 17:00


 8/30/17 16:59  8/2/17 17:45


15 MLS/HR


 


 Gabapentin


  (Neurontin Cap)  300 mg  QID


 PO  8/2/17 17:00


 9/1/17 16:59  8/3/17 12:40


300 MG


 


 Ranitidine HCl


  (zANTac TAB)  150 mg  BID


 PO  8/2/17 21:00


 9/1/17 20:59  8/3/17 07:27


150 MG


 


 Sertraline HCl


  (Zoloft Tab)  25 mg  DAILY


 PO  8/3/17 09:00


 9/2/17 08:59  8/3/17 07:28


25 MG


 


 Carbidopa/Levodopa


  (Sinemet 25/


 100MG Tab)  1.5 tab  TID


 PO  8/2/17 21:00


 8/26/17 20:59  8/3/17 14:29


1.5 TAB


 


 Clonazepam


  (Klonopin Tab)  0.5 mg  HS


 PO  8/2/17 21:00


 9/1/17 14:14  8/2/17 20:47


0.5 MG








 (Bertha Antonio PA-C)





Objective


Vital Signs











  Date Time  Temp Pulse Resp B/P (MAP) Pulse Ox O2 Delivery O2 Flow Rate FiO2


 


8/3/17 14:56 37.0 73 19 137/70 (92) 93 Room Air  


 


8/3/17 12:00 37.1 88 14 130/70 (90) 94 Room Air  


 


8/3/17 12:00 37.1 88 14 130/70 (90) 94 Room Air  


 


8/3/17 11:03  98 16  95 Room Air  


 


8/3/17 10:11    120/68 (85)    


 


8/3/17 07:46     90 Room Air  


 


8/3/17 07:45 37.2 98 16 174/72 (106) 90 Room Air  


 


8/3/17 07:30      Room Air  


 


8/3/17 07:00  101 18  90 Room Air  


 


8/3/17 05:41    150/79 (102)    


 


8/3/17 03:00 37.1 93 16 168/78 (108) 92 Room Air  


 


8/3/17 00:03      CPAP  


 


8/2/17 23:00 37.0 105 18 159/81 (107) 95 CPAP  


 


8/2/17 20:05  104 18  92 Room Air  


 


8/2/17 15:50 36.6 104 18 145/68 (93) 92 Room Air  


 


8/2/17 15:32  91 18  91 Room Air  








 (Bertha Antonio PA-C)





Physical Exam


General Appearance:  WD/WN, no apparent distress


Eyes:  sclerae normal


ENT:  hearing grossly normal


Neck:  supple, no JVD, trachea midline


Respiratory/Chest:  lungs clear, normal breath sounds, no respiratory distress, 

no accessory muscle use


Cardiovascular:  regular rate, rhythm, no gallop, no murmur


Abdomen:  normal bowel sounds, non tender, soft, + pertinent finding (dressing 

applied that is clean/dry/intact; CEDRICK drain with serosang drainage)


Extremities:  no calf tenderness


Neurologic/Psychiatric:  alert (but drowsy)


Skin:  normal color, warm/dry


 (Bertha Antonio PA-C)





Laboratory Results





Last 24 Hours








Test


  8/2/17


18:15 8/2/17


23:57 8/3/17


06:01 8/3/17


07:36


 


Bedside Glucose 108 mg/dl  108 mg/dl  97 mg/dl  


 


White Blood Count    12.37 K/uL 


 


Red Blood Count    3.76 M/uL 


 


Hemoglobin    11.1 g/dL 


 


Hematocrit    35.7 % 


 


Mean Corpuscular Volume    94.9 fL 


 


Mean Corpuscular Hemoglobin    29.5 pg 


 


Mean Corpuscular Hemoglobin


Concent 


  


  


  31.1 g/dl 


 


 


RDW Standard Deviation    48.4 fL 


 


RDW Coefficient of Variation    14.1 % 


 


Platelet Count    263 K/uL 


 


Mean Platelet Volume    9.4 fL 


 


Sodium Level    143 mmol/L 


 


Potassium Level    3.3 mmol/L 


 


Chloride Level    108 mmol/L 


 


Carbon Dioxide Level    27 mmol/L 


 


Anion Gap    8.0 mmol/L 


 


Blood Urea Nitrogen    10 mg/dl 


 


Creatinine    0.96 mg/dl 


 


Est Creatinine Clear Calc


Drug Dose 


  


  


  82.1 ml/min 


 


 


Estimated GFR (


American) 


  


  


  89.3 


 


 


Estimated GFR (Non-


American 


  


  


  77.0 


 


 


BUN/Creatinine Ratio    10.1 


 


Random Glucose    103 mg/dl 


 


Calcium Level    8.3 mg/dl 


 


Magnesium Level    1.9 mg/dl 


 


Test


  8/3/17


12:08 


  


  


 


 


Bedside Glucose 128 mg/dl    








 (Bertha Antonio, PADestineyC)





Assessment and Plan


This is a 75 yo M with PMHx of T2DM with Diabetic Neuropathy, HTN, Gout, GIULIA on 

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 

anxiety disorder who presents with abdominal pain and bloating since Tuesday. 





Sigmoid Volvulus S/P Decompression 7/28 and 7/29 and S/P Sigmoid Colectomy 7/31:


- Has H/O abdominal surgical removal of polyp and H/O gastroparesis - will 

limit opioids and benzos when possible


- Cipro 400 mg IV BID and Flagyl 500 mg IV Q8H on 7/28 to finish a 10 day 

course - DAY #7


- Ranitidine 150 mg BID


- GI and General Surgery following - recommendations reviewed and addressed - 

advance to clear liquids





Reduced Urine Output:


- UO improving with IV Lasix but urine remains dark - daily weights and will 

give another dose Lasix IV.


- Obtain U/A as WBC are trending up  - may be altered due to current Abx


- Given increased WBC - if diarrhea present will need to check C. diff





T2DM with Diabetic Neuropathy: A1c 5.9 (2/22/17)


- Pt was recently taken off metformin by his PCP - continue to monitor and 

coverage if necessary - no coverage required so far





Hypokalemia: RESOLVED


- Continue to monitor and replete as necessary





Parkinson's Disease/Restless Leg Syndrome: STABLE


- Gabapentin 300 mg QID and Carbidopa/Levodopa 25/100 mg TID





HTN:


- On Triamterene/HCT 37.5/25 - 1/2 tablet daily - will hold at this time due to 

surgical intervention


- Hydralazine PRN





Gout:


- Allopurinol 300 mg daily - will hold at this time





GIULIA:


- CPAP at night - allow Ativan IV PRN for claustrophobia with CPAP and anxiety


- Clonazepam and Sertraline 25 mg daily





DVT Prophylaxis: BERENICE/SCDs; withhold chemical prophylaxis for surgical 

intervention





Code Status: FULL RESUSCITATION





Disposition: Patient from home - daughter prefers return home with home PT/OT 

if necessary


- PT/OT evaluations - given weakness/surgical intervention/tremors - unsure of 

the ability for home PT/OT services


- Did discuss with son that he may need inpatient rehab instead of home 

services and expressed that the goal is the safest option for discharge


   -- Anticipate advancement of diet and hopeful wean from PCA - possible stay 3

-4 days


Continued [facility] stay due to:  multiple IV medications needed


Discharge planning:  uncertain


 (Bertha Antonio, EMMANUEL)





Reviewed:  Pt Seen/Exam by Me


 (Mar Chandler MD)


History


Physician Assistant Supervision Note:





I interviewed and examined the patient. Discussed with [place] Antonio and agree with 

findings and plan as documented in the note. Any exceptions or clarifications 

are listed here: 





had good UOP yesterday with IV lasix, WBC count creeping up today, afebrile. He 

denies flatus or BM but is eating and tolerating diet. Denies cough or CP or SOB





Vitals reviewed


NAD,sitting in bed


RRR no mgr


CTAB, breathing unlabored


Abd hypoactive BS, soft, NT, dressing over midline incision is with scant 

serosang drainge, CEDRICK drain with scant serosang fluid in it


Vallejo in palce draining clear yellow urine


Ext: trace pitting edema bilat legs improved from yesterday, no calf tenderness

, 2+ DP pulses


Neuro: no further tremors in extremities, is more alert today and oriented x 3, 

answering all questions appropriately





75 yo male with a h/o PD, DMII, anxiety, HTN, gout, GIULIA on CPAP, here with 

sigmoid volvulus requiring sigmoid colectomy.


DOes have slightly worse leukocytosis today but afebrile.


-checking UA and Ur cx to r/o UTI as cause of leukocytosis


-continue diuresis


-post-op care as per Surgery, CEDRICK drain and adv diet as tolerated to full 

liquids today








Documented By:  Mar Chandler


 (Mar Chandler MD)

## 2017-08-04 VITALS — OXYGEN SATURATION: 92 % | HEART RATE: 95 BPM | TEMPERATURE: 98.06 F

## 2017-08-04 VITALS — OXYGEN SATURATION: 92 % | HEART RATE: 89 BPM

## 2017-08-04 VITALS
HEART RATE: 100 BPM | OXYGEN SATURATION: 95 % | DIASTOLIC BLOOD PRESSURE: 74 MMHG | SYSTOLIC BLOOD PRESSURE: 167 MMHG | TEMPERATURE: 98.42 F

## 2017-08-04 VITALS
HEART RATE: 86 BPM | SYSTOLIC BLOOD PRESSURE: 148 MMHG | OXYGEN SATURATION: 96 % | TEMPERATURE: 97.7 F | DIASTOLIC BLOOD PRESSURE: 53 MMHG

## 2017-08-04 VITALS — OXYGEN SATURATION: 93 % | HEART RATE: 87 BPM

## 2017-08-04 VITALS — HEART RATE: 100 BPM | OXYGEN SATURATION: 95 %

## 2017-08-04 VITALS
SYSTOLIC BLOOD PRESSURE: 182 MMHG | HEART RATE: 98 BPM | DIASTOLIC BLOOD PRESSURE: 85 MMHG | TEMPERATURE: 98.96 F | OXYGEN SATURATION: 94 %

## 2017-08-04 VITALS — HEART RATE: 80 BPM | OXYGEN SATURATION: 94 %

## 2017-08-04 VITALS — SYSTOLIC BLOOD PRESSURE: 170 MMHG | DIASTOLIC BLOOD PRESSURE: 95 MMHG

## 2017-08-04 VITALS
HEART RATE: 91 BPM | OXYGEN SATURATION: 92 % | DIASTOLIC BLOOD PRESSURE: 61 MMHG | TEMPERATURE: 98.42 F | SYSTOLIC BLOOD PRESSURE: 122 MMHG

## 2017-08-04 VITALS — OXYGEN SATURATION: 87 %

## 2017-08-04 VITALS
TEMPERATURE: 99.32 F | DIASTOLIC BLOOD PRESSURE: 80 MMHG | SYSTOLIC BLOOD PRESSURE: 167 MMHG | HEART RATE: 94 BPM | OXYGEN SATURATION: 96 %

## 2017-08-04 VITALS — DIASTOLIC BLOOD PRESSURE: 75 MMHG | SYSTOLIC BLOOD PRESSURE: 165 MMHG

## 2017-08-04 VITALS — DIASTOLIC BLOOD PRESSURE: 57 MMHG | SYSTOLIC BLOOD PRESSURE: 133 MMHG

## 2017-08-04 LAB
ANION GAP SERPL CALC-SCNC: 8 MMOL/L (ref 3–11)
BASOPHILS # BLD: 0.01 K/UL (ref 0–0.2)
BASOPHILS NFR BLD: 0.1 %
BUN SERPL-MCNC: 12 MG/DL (ref 7–18)
BUN/CREAT SERPL: 12 (ref 10–20)
CALCIUM SERPL-MCNC: 8.3 MG/DL (ref 8.5–10.1)
CHLORIDE SERPL-SCNC: 105 MMOL/L (ref 98–107)
CO2 SERPL-SCNC: 28 MMOL/L (ref 21–32)
COMPLETE: YES
CREAT CL PREDICTED SERPL C-G-VRATE: 82.1 ML/MIN
CREAT SERPL-MCNC: 0.96 MG/DL (ref 0.6–1.4)
EOSINOPHIL NFR BLD AUTO: 310 K/UL (ref 130–400)
GASTRIC OCCULT BLOOD PH: (no result)
GASTROCULT GAST QL: (no result)
GLUCOSE SERPL-MCNC: 138 MG/DL (ref 70–99)
HCT VFR BLD CALC: 36.4 % (ref 42–52)
IG%: 0.6 %
IMM GRANULOCYTES NFR BLD AUTO: 6.5 %
LYMPHOCYTES # BLD: 0.73 K/UL (ref 1.2–3.4)
MAGNESIUM SERPL-MCNC: 1.9 MG/DL (ref 1.8–2.4)
MCH RBC QN AUTO: 29.5 PG (ref 25–34)
MCHC RBC AUTO-ENTMCNC: 31 G/DL (ref 32–36)
MCV RBC AUTO: 95 FL (ref 80–100)
MONOCYTES NFR BLD: 9.4 %
NEUTROPHILS # BLD AUTO: 0.1 %
NEUTROPHILS NFR BLD AUTO: 83.3 %
PMV BLD AUTO: 9.6 FL (ref 7.4–10.4)
POTASSIUM SERPL-SCNC: 3.4 MMOL/L (ref 3.5–5.1)
RBC # BLD AUTO: 3.83 M/UL (ref 4.7–6.1)
SODIUM SERPL-SCNC: 141 MMOL/L (ref 136–145)
WBC # BLD AUTO: 11.31 K/UL (ref 4.8–10.8)

## 2017-08-04 RX ADMIN — PROMETHAZINE HYDROCHLORIDE PRN MLS/HR: 25 INJECTION INTRAMUSCULAR; INTRAVENOUS at 05:28

## 2017-08-04 RX ADMIN — RANITIDINE HYDROCHLORIDE SCH MLS/HR: 25 INJECTION, SOLUTION INTRAMUSCULAR; INTRAVENOUS at 23:50

## 2017-08-04 RX ADMIN — MORPHINE SULFATE PRN MG: 50 INJECTION, SOLUTION, CONCENTRATE INTRAVENOUS at 09:52

## 2017-08-04 RX ADMIN — MAXZIDE SCH TAB: 37.5; 25 TABLET ORAL at 09:06

## 2017-08-04 RX ADMIN — SERTRALINE HYDROCHLORIDE SCH MG: 50 TABLET, FILM COATED ORAL at 07:14

## 2017-08-04 RX ADMIN — IPRATROPIUM BROMIDE AND ALBUTEROL SULFATE SCH ML: .5; 3 SOLUTION RESPIRATORY (INHALATION) at 11:23

## 2017-08-04 RX ADMIN — DEXTROSE MONOHYDRATE, SODIUM CHLORIDE, AND POTASSIUM CHLORIDE SCH MLS/HR: 50; 4.5; 1.49 INJECTION, SOLUTION INTRAVENOUS at 18:16

## 2017-08-04 RX ADMIN — IPRATROPIUM BROMIDE AND ALBUTEROL SULFATE SCH ML: .5; 3 SOLUTION RESPIRATORY (INHALATION) at 07:17

## 2017-08-04 RX ADMIN — GABAPENTIN SCH MG: 300 CAPSULE ORAL at 07:14

## 2017-08-04 RX ADMIN — CIPROFLOXACIN SCH MLS/HR: 2 INJECTION, SOLUTION INTRAVENOUS at 20:14

## 2017-08-04 RX ADMIN — PROMETHAZINE HYDROCHLORIDE PRN MLS/HR: 25 INJECTION INTRAMUSCULAR; INTRAVENOUS at 23:16

## 2017-08-04 RX ADMIN — CARBIDOPA AND LEVODOPA SCH TAB: 25; 100 TABLET ORAL at 13:35

## 2017-08-04 RX ADMIN — CARBIDOPA AND LEVODOPA SCH TAB: 25; 100 TABLET ORAL at 07:13

## 2017-08-04 RX ADMIN — IPRATROPIUM BROMIDE AND ALBUTEROL SULFATE SCH ML: .5; 3 SOLUTION RESPIRATORY (INHALATION) at 20:40

## 2017-08-04 RX ADMIN — IPRATROPIUM BROMIDE AND ALBUTEROL SULFATE SCH ML: .5; 3 SOLUTION RESPIRATORY (INHALATION) at 15:13

## 2017-08-04 RX ADMIN — MORPHINE SULFATE PRN MG: 50 INJECTION, SOLUTION, CONCENTRATE INTRAVENOUS at 07:08

## 2017-08-04 RX ADMIN — RANITIDINE SCH MG: 150 TABLET ORAL at 07:14

## 2017-08-04 RX ADMIN — METRONIDAZOLE SCH MLS/HR: 500 INJECTION, SOLUTION INTRAVENOUS at 09:05

## 2017-08-04 RX ADMIN — ONDANSETRON PRN MG: 2 INJECTION INTRAMUSCULAR; INTRAVENOUS at 03:46

## 2017-08-04 RX ADMIN — MORPHINE SULFATE PRN MG: 50 INJECTION, SOLUTION, CONCENTRATE INTRAVENOUS at 15:10

## 2017-08-04 RX ADMIN — ONDANSETRON PRN MG: 2 INJECTION INTRAMUSCULAR; INTRAVENOUS at 16:28

## 2017-08-04 RX ADMIN — GABAPENTIN SCH MG: 300 CAPSULE ORAL at 13:35

## 2017-08-04 RX ADMIN — INSULIN ASPART SCH UNITS: 100 INJECTION, SOLUTION INTRAVENOUS; SUBCUTANEOUS at 08:00

## 2017-08-04 RX ADMIN — CIPROFLOXACIN SCH MLS/HR: 2 INJECTION, SOLUTION INTRAVENOUS at 07:14

## 2017-08-04 RX ADMIN — METRONIDAZOLE SCH MLS/HR: 500 INJECTION, SOLUTION INTRAVENOUS at 18:16

## 2017-08-04 RX ADMIN — INSULIN ASPART SCH UNITS: 100 INJECTION, SOLUTION INTRAVENOUS; SUBCUTANEOUS at 12:00

## 2017-08-04 RX ADMIN — PROMETHAZINE HYDROCHLORIDE PRN MLS/HR: 25 INJECTION INTRAMUSCULAR; INTRAVENOUS at 16:51

## 2017-08-04 RX ADMIN — LORAZEPAM PRN MLS/MIN: 2 INJECTION INTRAMUSCULAR; INTRAVENOUS at 23:51

## 2017-08-04 RX ADMIN — ALLOPURINOL SCH MG: 300 TABLET ORAL at 09:07

## 2017-08-04 RX ADMIN — INSULIN ASPART SCH UNITS: 100 INJECTION, SOLUTION INTRAVENOUS; SUBCUTANEOUS at 22:08

## 2017-08-04 RX ADMIN — INSULIN ASPART SCH UNITS: 100 INJECTION, SOLUTION INTRAVENOUS; SUBCUTANEOUS at 17:15

## 2017-08-04 RX ADMIN — METRONIDAZOLE SCH MLS/HR: 500 INJECTION, SOLUTION INTRAVENOUS at 02:38

## 2017-08-04 NOTE — HOSPITALIST PROGRESS NOTE
Hospitalist Progress Note


Date of Service


Aug 4, 2017.





Subjective


Pt evaluation today including:  conversation w/ patient, conversation w/ family

, physical exam, conversation w/ consultant (Surgery Dr. Donato)


Not voiding much since Weaver out earlier today. Also started having profuse 

emesis and nausea. Has not passed any flatus. Denies abd pain. N/V not 

responding to antiemetics.





   Constitutional:  No fever


   Respiratory:  No shortness of breath


   Cardiovascular:  No chest pain


   Abdomen:  + nausea, + vomiting, + constipation


   Neurologic:  + problem reported (daughter reports he is still "not quite 

himself")


   Psychiatric:  + anxiety


   All Other Systems:  Reviewed and Negative





Objective


Vital Signs











  Date Time  Temp Pulse Resp B/P (MAP) Pulse Ox O2 Delivery O2 Flow Rate FiO2


 


8/4/17 15:20 37.4 94 18 167/80 (109) 96 Room Air  


 


8/4/17 15:15      Room Air  


 


8/4/17 15:13  87 18  93 Room Air  


 


8/4/17 11:24  80 20  94 Room Air  


 


8/4/17 10:31 36.7 95 18  92 Room Air  


 


8/4/17 10:05    133/57 (82)    


 


8/4/17 07:30      Room Air  


 


8/4/17 07:19  100 18  95 Room Air  


 


8/4/17 07:04 36.9 100 20 167/74 (105) 95 Nasal Cannula 2.0 


 


8/4/17 06:37    165/75 (105)    


 


8/4/17 04:00      Nasal Cannula 2.0 


 


8/4/17 03:53    170/95 (120)    


 


8/4/17 03:23 37.2 98 22 182/85 (117) 94 Nasal Cannula 2.0 


 


8/4/17 03:20     87 Room Air  


 


8/4/17 00:09      Room Air  


 


8/3/17 22:55 37.4 91 24 151/82 (105) 96 Room Air  


 


8/3/17 21:01  91 16  95 Room Air  











Physical Exam


General Appearance:  + mild distress (and actively vomiting), + obese


Eyes:  normal inspection, sclerae normal


ENT:  hearing grossly normal


Neck:  trachea midline


Respiratory/Chest:  lungs clear, normal breath sounds, no respiratory distress, 

no accessory muscle use


Cardiovascular:  regular rate, rhythm, no edema, no gallop, no murmur


Abdomen:  non tender, + abnormal bowel sounds (hypoactive), + distended (

moderately), + pertinent finding (dressing in place is c/d/i over entire abdomen

)


Extremities:  no calf tenderness


Neurologic/Psychiatric:  alert (but looks exhausted), oriented x 3


Skin:  normal color, + diaphoresis





Laboratory Results





Last 24 Hours








Test


  8/3/17


20:50 8/4/17


06:20 8/4/17


06:43 8/4/17


07:53


 


Bedside Glucose 117 mg/dl    151 mg/dl 


 


Gastric Fluid pH  5-7   


 


Gastric Fluid Occult Blood  POS   


 


White Blood Count   11.31 K/uL  


 


Red Blood Count   3.83 M/uL  


 


Hemoglobin   11.3 g/dL  


 


Hematocrit   36.4 %  


 


Mean Corpuscular Volume   95.0 fL  


 


Mean Corpuscular Hemoglobin   29.5 pg  


 


Mean Corpuscular Hemoglobin


Concent 


  


  31.0 g/dl 


  


 


 


Platelet Count   310 K/uL  


 


Mean Platelet Volume   9.6 fL  


 


Neutrophils (%) (Auto)   83.3 %  


 


Lymphocytes (%) (Auto)   6.5 %  


 


Monocytes (%) (Auto)   9.4 %  


 


Eosinophils (%) (Auto)   0.1 %  


 


Basophils (%) (Auto)   0.1 %  


 


Neutrophils # (Auto)   9.43 K/uL  


 


Lymphocytes # (Auto)   0.73 K/uL  


 


Monocytes # (Auto)   1.06 K/uL  


 


Eosinophils # (Auto)   0.01 K/uL  


 


Basophils # (Auto)   0.01 K/uL  


 


RDW Standard Deviation   48.8 fL  


 


RDW Coefficient of Variation   14.1 %  


 


Immature Granulocyte % (Auto)   0.6 %  


 


Immature Granulocyte # (Auto)   0.07 K/uL  


 


Sodium Level   141 mmol/L  


 


Potassium Level   3.4 mmol/L  


 


Chloride Level   105 mmol/L  


 


Carbon Dioxide Level   28 mmol/L  


 


Anion Gap   8.0 mmol/L  


 


Blood Urea Nitrogen   12 mg/dl  


 


Creatinine   0.96 mg/dl  


 


Est Creatinine Clear Calc


Drug Dose 


  


  82.1 ml/min 


  


 


 


Estimated GFR (


American) 


  


  89.3 


  


 


 


Estimated GFR (Non-


American 


  


  77.0 


  


 


 


BUN/Creatinine Ratio   12.0  


 


Random Glucose   138 mg/dl  


 


Calcium Level   8.3 mg/dl  


 


Magnesium Level   1.9 mg/dl  


 


Test


  8/4/17


11:52 


  


  


 


 


Bedside Glucose 125 mg/dl    











Assessment and Plan


This is a 77 yo M with PMHx of T2DM with Diabetic Neuropathy, HTN, Gout, GIULIA on 

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 

anxiety disorder who presents with abdominal pain, bloating, and found to have 

sigmoid volvulus.





Sigmoid Volvulus S/P Decompression 7/28 and 7/29 and S/P Sigmoid Colectomy 7/31:

- with post-op ileus worsening today, with N/V and gross distension of abdomen


-place NGT to LIS again today


-make NPO again


- continue Cipro 400 mg IV BID and Flagyl 500 mg IV Q8H on 7/28 to finish a 10 

day course - DAY #8


- hold Ranitidine po as is NPO, give IV


- General Surgery management appreciated


-will likely need TPN-ordered labs and Dietary consultation


-will probably place PICC line tomorrow for TPN


-antiemetics prn


-STOP PCA pump today, make morphine 2mg IV q4h prn





Low urine output: gave IV lasix x 2 days and helped diurese. Now with N/V and 

ileus, high output from NGT. UA without overt infection


-give IVFs now while NPO


-follow UOP and straight cath is PVR>350 mL





T2DM with Diabetic Neuropathy: A1c 5.9 (2/22/17)


- Pt was recently taken off metformin by his PCP - continue to monitor and 

coverage if necessary - no coverage required so far





Hypokalemia: 


- Continue to monitor and replete as necessary





Parkinson's Disease/Restless Leg Syndrome: STABLE


-will have to hold Gabapentin 300 mg QID and Carbidopa/Levodopa 25/100 mg 1.5 

tabs po TID while NPO





HTN: BPs were elevated today and was started on metoprolol tartrate and back on 

Dyazide this AM, now NPO with NGT in place


- hold Triamterene/HCT 37.5/25 - 1/2 tablet daily 


-hold metoprolol


- Hydralazine PRN


-Vasotec IV prn





Gout:


- Allopurinol 300 mg daily - will hold at this time





GIULIA:


- CPAP at night


- hold Clonazepam and Sertraline 25 mg daily again while NPO


-IV ativan prn to replace clonazepam to prevent withdrawal and for anxiety





DVT Prophylaxis: BERENICE/SCDs; withhold chemical prophylaxis for surgical 

intervention





Code Status: FULL RESUSCITATION





Disposition: given prolonged hospital course, underlying Parkinson's, is going 

to need SNF vs acute rehab

## 2017-08-04 NOTE — DIAGNOSTIC IMAGING REPORT
[facility]



HISTORY:      vomiting,recent sigmoidectomy



COMPARISON: [facility] 7/31/2017.



FINDINGS: Suspect a trace left pleural effusion. There are left basilar linear

densities. There are midline lower abdominal skin staples and a surgical drain

within the deep pelvis. This is new from the prior study. Multiple distended

gas-filled loops of large and small bowel are seen throughout the abdomen. The

small and large bowel distention has slightly improved. Moderate to severe

gaseous distention of the stomach persists.  No renal calculi. No ureteral

calculi. No pneumoperitoneum or pneumatosis.



IMPRESSION:  

Interval postoperative changes within the abdomen/pelvis. Slight improvement in

the distended gas-filled loops of large and small bowel. Moderate to severe

gaseous distention of the stomach persists.







Electronically signed by:  Wale Sultana M.D.

8/4/2017 6:25 PM



Dictated Date/Time:  8/4/2017 6:23 PM

## 2017-08-04 NOTE — DIAGNOSTIC IMAGING REPORT
[facility]



HISTORY:      check NGT placement



COMPARISON: [facility] 8/4/2017.



FINDINGS: Mildly distended gas-filled stomach has improved. Multiple mildly

distended gas-filled loops of large bowel are again noted. Nasogastric tube

terminates in the body of the stomach.      No pneumoperitoneum or pneumatosis.



IMPRESSION:  

Nasogastric tube terminates in the body of stomach. Mildly distended gas-filled

stomach has improved.







Electronically signed by:  Wale Sultana M.D.

8/4/2017 9:43 PM



Dictated Date/Time:  8/4/2017 9:41 PM

## 2017-08-04 NOTE — SURGERY PROGRESS NOTE
Surgery Progress Note


Date of Service


Aug 4, 2017.





Subjective


Post OP Day:  4


pt somnolent but no complaints


denies pain


no bm yet





Objective


Vital Signs:











  Date Time  Temp Pulse Resp B/P (MAP) Pulse Ox O2 Delivery O2 Flow Rate FiO2


 


8/4/17 07:30      Room Air  


 


8/4/17 07:19  100 18  95 Room Air  


 


8/4/17 07:04 36.9 100 20 167/74 (105) 95 Nasal Cannula 2.0 


 


8/4/17 06:37    165/75 (105)    


 


8/4/17 04:00      Nasal Cannula 2.0 


 


8/4/17 03:53    170/95 (120)    


 


8/4/17 03:23 37.2 98 22 182/85 (117) 94 Nasal Cannula 2.0 


 


8/4/17 03:20     87 Room Air  


 


8/4/17 00:09      Room Air  


 


8/3/17 22:55 37.4 91 24 151/82 (105) 96 Room Air  


 


8/3/17 21:01  91 16  95 Room Air  


 


8/3/17 15:35      Room Air  


 


8/3/17 15:30  73 16  94 Room Air  


 


8/3/17 14:56 37.0 73 19 137/70 (92) 93 Room Air  


 


8/3/17 12:00 37.1 88 14 130/70 (90) 94 Room Air  


 


8/3/17 12:00 37.1 88 14 130/70 (90) 94 Room Air  


 


8/3/17 11:03  98 16  95 Room Air  


 


8/3/17 10:11    120/68 (85)    








General Appearance:  no apparent distress, + pertinent finding (somnolent)


Abdomen:  non tender, soft


Incision(s):  clean, dry, no erythema, findings (CEDRICK serous)


Laboratory Results:





Results Past 24 Hours








Test


  8/3/17


12:08 8/3/17


16:55 8/3/17


17:10 8/3/17


20:50 Range/Units


 


 


Bedside Glucose 128  108 117 70-99  mg/dl


 


Urine Color  YELLOW    


 


Urine Appearance  CLEAR   CLEAR  


 


Urine pH  5.0   4.5-7.5  


 


Urine Specific Gravity  1.018   1.000-1.030  


 


Urine Protein  NEG   NEG  


 


Urine Glucose (UA)  NEG   NEG  


 


Urine Ketones  NEG   NEG  


 


Urine Occult Blood  3+   NEG  


 


Urine Nitrite  NEG   NEG  


 


Urine Bilirubin  NEG   NEG  


 


Urine Urobilinogen  NEG   NEG  


 


Urine Leukocyte Esterase  SMALL   NEG  


 


Urine WBC (Auto)  5-10   0-5  /hpf


 


Urine RBC (Auto)  >30   0-4  /hpf


 


Urine Hyaline Casts (Auto)  5-10   0-5  /lpf


 


Urine Epithelial Cells (Auto)  5-10   0-5  /lpf


 


Urine Bacteria (Auto)  NEG   NEG  


 


Urine Pathogenic Casts     0  /lpf


 


Test


  8/4/17


06:20 8/4/17


06:43 


  


  Range/Units


 


 


Gastric Fluid pH 5-7     


 


Gastric Fluid Occult Blood POS    NEG  


 


White Blood Count  11.31   4.8-10.8  K/uL


 


Red Blood Count  3.83   4.7-6.1  M/uL


 


Hemoglobin  11.3   14.0-18.0  g/dL


 


Hematocrit  36.4   42-52  %


 


Mean Corpuscular Volume  95.0     fL


 


Mean Corpuscular Hemoglobin  29.5   25-34  pg


 


Mean Corpuscular Hemoglobin


Concent 


  31.0


  


  


  32-36  g/dl


 


 


Platelet Count  310   130-400  K/uL


 


Mean Platelet Volume  9.6   7.4-10.4  fL


 


Neutrophils (%) (Auto)  83.3    %


 


Lymphocytes (%) (Auto)  6.5    %


 


Monocytes (%) (Auto)  9.4    %


 


Eosinophils (%) (Auto)  0.1    %


 


Basophils (%) (Auto)  0.1    %


 


Neutrophils # (Auto)  9.43   1.4-6.5  K/uL


 


Lymphocytes # (Auto)  0.73   1.2-3.4  K/uL


 


Monocytes # (Auto)  1.06   0.11-0.59  K/uL


 


Eosinophils # (Auto)  0.01   0-0.5  K/uL


 


Basophils # (Auto)  0.01   0-0.2  K/uL


 


RDW Standard Deviation  48.8   36.4-46.3  fL


 


RDW Coefficient of Variation  14.1   11.5-14.5  %


 


Immature Granulocyte % (Auto)  0.6    %


 


Immature Granulocyte # (Auto)  0.07   0.00-0.02  K/uL


 


Sodium Level  141   136-145  mmol/L


 


Potassium Level  3.4   3.5-5.1  mmol/L


 


Chloride Level  105     mmol/L


 


Carbon Dioxide Level  28   21-32  mmol/L


 


Anion Gap  8.0   3-11  mmol/L


 


Blood Urea Nitrogen  12   7-18  mg/dl


 


Creatinine


  


  0.96


  


  


  0.60-1.40


mg/dl


 


Est Creatinine Clear Calc


Drug Dose 


  82.1


  


  


   ml/min


 


 


Estimated GFR (


American) 


  89.3


  


  


   


 


 


Estimated GFR (Non-


American 


  77.0


  


  


   


 


 


BUN/Creatinine Ratio  12.0   10-20  


 


Random Glucose  138   70-99  mg/dl


 


Calcium Level  8.3   8.5-10.1  mg/dl


 


Magnesium Level  1.9   1.8-2.4  mg/dl








Microbiology Results


8/3/17 Urine Culture, Received


         Pending





Assessment & Plan


8/4/17


doing ok but we need to get him moving


d/c pca herber basal rate


PT/OT


? can we d/c [place]


awaiting bowel fx








8/3/27


doing well clinically


awaiting bowel fx


Cedrick serous


may increase to full liquids


follow wbc








8/1/17


sigmoid volvulus


   partially decompressed after endoscopy x2 and rectal tube placement


   high likelihood of recurrence once tube is removed


   will proceed with sigmoid colectomy this afternoon





as above. discussed with pt and his daughter. discussed options/risks. 

discussed high likelihood of recurrence.  discussed surgical risks ( bleeding/

infection/dvt/pe/mi/leaks/injury to other organs such as ureter/bladder/small 

bowel etc...).  answered questions. will proceed with open sigmoidectomy this 

afternoon.


8/3/27


doing well clinically


awaiting bowel fx


Cedrick serous


may increase to full liquids


follow wbc








8/1/17


sigmoid volvulus


   partially decompressed after endoscopy x2 and rectal tube placement


   high likelihood of recurrence once tube is removed


   will proceed with sigmoid colectomy this afternoon





as above. discussed with pt and his daughter. discussed options/risks. 

discussed high likelihood of recurrence.  discussed surgical risks ( bleeding/

infection/dvt/pe/mi/leaks/injury to other organs such as ureter/bladder/small 

bowel etc...).  answered questions. will proceed with open sigmoidectomy this 

afternoon.

## 2017-08-05 VITALS — OXYGEN SATURATION: 93 % | HEART RATE: 75 BPM

## 2017-08-05 VITALS
HEART RATE: 81 BPM | TEMPERATURE: 98.42 F | DIASTOLIC BLOOD PRESSURE: 73 MMHG | OXYGEN SATURATION: 92 % | SYSTOLIC BLOOD PRESSURE: 143 MMHG

## 2017-08-05 VITALS
DIASTOLIC BLOOD PRESSURE: 72 MMHG | HEART RATE: 70 BPM | TEMPERATURE: 98.6 F | OXYGEN SATURATION: 94 % | SYSTOLIC BLOOD PRESSURE: 155 MMHG

## 2017-08-05 VITALS
TEMPERATURE: 98.6 F | SYSTOLIC BLOOD PRESSURE: 153 MMHG | DIASTOLIC BLOOD PRESSURE: 67 MMHG | HEART RATE: 97 BPM | OXYGEN SATURATION: 96 %

## 2017-08-05 VITALS
DIASTOLIC BLOOD PRESSURE: 66 MMHG | OXYGEN SATURATION: 96 % | TEMPERATURE: 98.78 F | SYSTOLIC BLOOD PRESSURE: 148 MMHG | HEART RATE: 64 BPM

## 2017-08-05 VITALS — OXYGEN SATURATION: 96 % | SYSTOLIC BLOOD PRESSURE: 158 MMHG | HEART RATE: 95 BPM | DIASTOLIC BLOOD PRESSURE: 99 MMHG

## 2017-08-05 VITALS
SYSTOLIC BLOOD PRESSURE: 139 MMHG | OXYGEN SATURATION: 96 % | TEMPERATURE: 98.96 F | DIASTOLIC BLOOD PRESSURE: 65 MMHG | HEART RATE: 68 BPM

## 2017-08-05 LAB
ALP SERPL-CCNC: 66 U/L (ref 45–117)
ALT SERPL-CCNC: 22 U/L (ref 12–78)
ANION GAP SERPL CALC-SCNC: 5 MMOL/L (ref 3–11)
AST SERPL-CCNC: 30 U/L (ref 15–37)
BUN SERPL-MCNC: 10 MG/DL (ref 7–18)
BUN/CREAT SERPL: 10.8 (ref 10–20)
CALCIUM SERPL-MCNC: 8.3 MG/DL (ref 8.5–10.1)
CHLORIDE SERPL-SCNC: 107 MMOL/L (ref 98–107)
CHOLEST/HDLC SERPL: 2.8 {RATIO}
CO2 SERPL-SCNC: 28 MMOL/L (ref 21–32)
CREAT CL PREDICTED SERPL C-G-VRATE: 84.8 ML/MIN
CREAT SERPL-MCNC: 0.93 MG/DL (ref 0.6–1.4)
EOSINOPHIL NFR BLD AUTO: 275 K/UL (ref 130–400)
GLUCOSE SERPL-MCNC: 113 MG/DL (ref 70–99)
GLUCOSE UR QL: 26 MG/DL
HCT VFR BLD CALC: 31.9 % (ref 42–52)
KETONES UR QL STRIP: 33 MG/DL
MAGNESIUM SERPL-MCNC: 1.8 MG/DL (ref 1.8–2.4)
MAGNESIUM SERPL-MCNC: 1.9 MG/DL (ref 1.8–2.4)
MCH RBC QN AUTO: 31.2 PG (ref 25–34)
MCHC RBC AUTO-ENTMCNC: 33.5 G/DL (ref 32–36)
MCV RBC AUTO: 93 FL (ref 80–100)
NITRITE UR QL STRIP: 74 MG/DL (ref 0–150)
PH UR: 74 MG/DL (ref 0–200)
PHOSPHATE SERPL-MCNC: 1.5 MG/DL (ref 2.5–4.9)
PHOSPHATE SERPL-MCNC: 2.5 MG/DL (ref 2.5–4.9)
PMV BLD AUTO: 9.4 FL (ref 7.4–10.4)
POTASSIUM SERPL-SCNC: 3.2 MMOL/L (ref 3.5–5.1)
POTASSIUM SERPL-SCNC: 3.5 MMOL/L (ref 3.5–5.1)
PREALB SERPL-MCNC: 11 MG/DL (ref 20–40)
RBC # BLD AUTO: 3.43 M/UL (ref 4.7–6.1)
SODIUM SERPL-SCNC: 140 MMOL/L (ref 136–145)
VERY LOW DENSITY LIPOPROT CALC: 15 MG/DL
WBC # BLD AUTO: 11.57 K/UL (ref 4.8–10.8)

## 2017-08-05 PROCEDURE — 05HY33Z INSERTION OF INFUSION DEVICE INTO UPPER VEIN, PERCUTANEOUS APPROACH: ICD-10-PCS | Performed by: FAMILY MEDICINE

## 2017-08-05 RX ADMIN — INSULIN ASPART SCH UNITS: 100 INJECTION, SOLUTION INTRAVENOUS; SUBCUTANEOUS at 12:00

## 2017-08-05 RX ADMIN — METRONIDAZOLE SCH MLS/HR: 500 INJECTION, SOLUTION INTRAVENOUS at 19:36

## 2017-08-05 RX ADMIN — CIPROFLOXACIN SCH MLS/HR: 2 INJECTION, SOLUTION INTRAVENOUS at 10:36

## 2017-08-05 RX ADMIN — RANITIDINE HYDROCHLORIDE SCH MLS/HR: 25 INJECTION, SOLUTION INTRAMUSCULAR; INTRAVENOUS at 09:30

## 2017-08-05 RX ADMIN — METRONIDAZOLE SCH MLS/HR: 500 INJECTION, SOLUTION INTRAVENOUS at 10:42

## 2017-08-05 RX ADMIN — METRONIDAZOLE SCH MLS/HR: 500 INJECTION, SOLUTION INTRAVENOUS at 01:48

## 2017-08-05 RX ADMIN — INSULIN ASPART SCH UNITS: 100 INJECTION, SOLUTION INTRAVENOUS; SUBCUTANEOUS at 05:54

## 2017-08-05 RX ADMIN — CIPROFLOXACIN SCH MLS/HR: 2 INJECTION, SOLUTION INTRAVENOUS at 21:00

## 2017-08-05 RX ADMIN — INSULIN ASPART SCH UNITS: 100 INJECTION, SOLUTION INTRAVENOUS; SUBCUTANEOUS at 18:00

## 2017-08-05 RX ADMIN — IPRATROPIUM BROMIDE AND ALBUTEROL SULFATE SCH ML: .5; 3 SOLUTION RESPIRATORY (INHALATION) at 07:47

## 2017-08-05 RX ADMIN — INSULIN ASPART SCH UNITS: 100 INJECTION, SOLUTION INTRAVENOUS; SUBCUTANEOUS at 23:59

## 2017-08-05 RX ADMIN — DEXTROSE MONOHYDRATE, SODIUM CHLORIDE, AND POTASSIUM CHLORIDE SCH MLS/HR: 50; 4.5; 1.49 INJECTION, SOLUTION INTRAVENOUS at 06:03

## 2017-08-05 NOTE — HOSPITALIST PROGRESS NOTE
Hospitalist Progress Note


Date of Service


Aug 5, 2017.


 (Bertha Antonio PA-C)





Subjective


Pt evaluation today including:  conversation w/ patient, conversation w/ family

, physical exam, chart review, lab review, review of studies, review of 

inpatient medication list


Pain:  None


PO Intake:  NPO


Voiding:  vallejo catheter in place


Patient seen and evaluated. Discussed with patient and daughter at bedside. 

Daughter had multiple questions and answered them for her.


Patient continues to have no complaints. He is more alert compared to previous 

days but hard to keep on task. 


When asked if he is passing gas he states yes...reporting 2 with the first one 

being less and likely referring to BMs.


Labs reviewed and electrolytes being repleted. Will have PICC line placed for 

TPN to start this evening. 


NGT remains in place with significant output. Appears green/dark no visible 

blood. Gastric occult was positive.





   Constitutional:  No fever, No chills


   ENT:  + problem reported (rhinorrhea in L nare 2/2 NGT placement)


   Respiratory:  No cough, No shortness of breath


   Cardiovascular:  No chest pain, No palpitations


   Abdomen:  No pain, No nausea, No vomiting, No diarrhea, No constipation


   Musculoskeletal:  No swelling, No calf pain


   Skin:  No rash (Bertha Antonio PA-C)





Medications





Current Inpatient Medications








 Medications


  (Trade)  Dose


 Ordered  Sig/Kalpesh


 Route  Start Time


 Stop Time Status Last Admin


Dose Admin


 


 Promethazine HCl


 12.5 mg/Sodium


 Chloride  50.5 ml @ 


 204 mls/hr  Q6H  PRN


 IV  7/27/17 16:00


 8/26/17 15:59  8/4/17 23:16


204 MLS/HR


 


 Ondansetron HCl


  (Zofran Inj)  4 mg  Q6H  PRN


 IV  7/27/17 16:00


 8/26/17 15:59  8/4/17 16:28


4 MG


 


 Ciprofloxacin/


 Dextrose 400 mg/


 Prmx  200 ml @ 


 100 mls/hr  Q12H


 IV  7/28/17 16:00


 8/7/17 20:59  8/5/17 10:36


100 MLS/HR


 


 Metronidazole 500


 mg/Prmx  100 ml @ 


 100 mls/hr  Q8H


 IV  7/28/17 18:00


 8/7/17 17:59  8/5/17 10:42


100 MLS/HR


 


 Albuterol/


 Ipratropium


  (Duoneb)  3 ml  Q2R  PRN


 INH  7/30/17 08:15


 8/29/17 08:14   


 


 


 Hydralazine HCl


  (HydrALAZINE INJ)  10 mg  Q6H  PRN


 IV.  7/31/17 08:30


 8/30/17 08:29  8/4/17 16:40


10 MG


 


 Naloxone HCl


  (Narcan Inj)  0.1 mg  Q5M  PRN


 IV  7/31/17 16:30


 8/30/17 16:29   


 


 


 Gabapentin


  (Neurontin Cap)  300 mg  QID


 PO  8/2/17 17:00


 9/1/17 16:59 Future Hold 8/4/17 13:35


300 MG


 


 Ranitidine HCl


  (zANTac TAB)  150 mg  BID


 PO  8/2/17 21:00


 9/1/17 20:59 Future Hold 8/4/17 07:14


150 MG


 


 Sertraline HCl


  (Zoloft Tab)  25 mg  DAILY


 PO  8/3/17 09:00


 9/2/17 08:59 Future Hold 8/4/17 07:14


25 MG


 


 Carbidopa/Levodopa


  (Sinemet 25/


 100MG Tab)  1.5 tab  TID


 PO  8/2/17 21:00


 8/26/17 20:59 Future Hold 8/4/17 13:35


1.5 TAB


 


 Clonazepam


  (Klonopin Tab)  0.5 mg  HS


 PO  8/2/17 21:00


 9/1/17 14:14 Future Hold 8/3/17 19:49


0.5 MG


 


 Metoprolol


 Tartrate


  (Lopressor Tab)  12.5 mg  BID


 PO  8/4/17 10:00


 9/3/17 09:59 Future Hold 8/4/17 09:07


12.5 MG


 


 Allopurinol


  (Zyloprim Tab)  300 mg  DAILY


 PO  8/4/17 10:00


 9/3/17 09:59 Future Hold 8/4/17 09:07


300 MG


 


 Aspirin


  (Ecotrin Tab)  81 mg  DAILY


 PO  8/4/17 10:00


 9/3/17 09:59 Future Hold 8/4/17 09:06


81 MG


 


 Triamterene/HCTZ


  (Maxzide 37.5/25


 Tab)  0.5 tab  DAILY


 PO  8/4/17 10:00


 9/3/17 09:59 Future Hold 8/4/17 09:06


0.5 TAB


 


 Cholecalciferol


  (Vitamin D Tab)  2,000


 inter.unit  DAILY


 PO  8/4/17 10:00


 9/3/17 09:59 Future Hold 8/4/17 09:07


2,000 INTER.UNIT


 


 Cyanocobalamin


  (Vitamin B-12


 Tab)  1,000 mcg  DAILY


 PO  8/4/17 10:00


 9/3/17 09:59 Future Hold 8/4/17 09:07


1,000 MCG


 


 Morphine Sulfate


  (MoRPHine


 SULFATE INJ)  2 mg  Q4H  PRN


 IV  8/4/17 17:00


 8/18/17 16:59   


 


 


 Miscellaneous


 Information


  (Pharmacy Tpn/


 Ppn Consult


 Active)  1 ea  UD  PRN


 N/A  8/4/17 17:15


 9/3/17 17:14   


 


 


 Enalaprilat 0.625


 mg/Dextrose  25.5 ml @ 


 100 mls/hr  Q8H  PRN


 IV  8/4/17 17:15


 9/3/17 17:14   


 


 


 Potassium


 Chloride/Dextrose/


 Sod Cl  1,000 ml @ 


 75 mls/hr  [id number]


 IV  8/4/17 18:00


 9/3/17 17:14  8/5/17 06:03


75 MLS/HR


 


 Insulin Aspart


  (novoLOG ASPART)  SLIDING


 SCALE


 PARAMETER  Q6


 SC  8/5/17 06:00


 9/4/17 05:59   


 


 


 Ranitidine HCl 50


 mg/Dextrose  102 ml @ 


 200 mls/hr  Q8H


 IV  8/5/17 00:00


 9/4/17 00:00  8/5/17 09:30


200 MLS/HR


 


 Lorazepam 0.5 mg/


 Syringe  0.5 ml @ 


 0.5 mls/min  Q6H  PRN


 IV  8/4/17 23:45


 9/3/17 23:44  8/4/17 23:51


0.5 MLS/MIN








 (Bertha Antonio, PA-C)





Objective


Vital Signs











  Date Time  Temp Pulse Resp B/P (MAP) Pulse Ox O2 Delivery O2 Flow Rate FiO2


 


8/5/17 07:47  75 18  93 Room Air  


 


8/5/17 07:01 36.9 81 16 143/73 (96) 92 Room Air  


 


8/5/17 03:26 37.0 97 16 153/67 (95) 96 Nasal Cannula 4.0 


 


8/4/17 22:48 36.5 86 16 148/53 (84) 96 Nasal Cannula 4.0 


 


8/4/17 20:40  89 18  92 Room Air  


 


8/4/17 19:25 36.9 91 19 122/61 (81) 92 Room Air  


 


8/4/17 19:16      Room Air  


 


8/4/17 15:20 37.4 94 18 167/80 (109) 96 Room Air  


 


8/4/17 15:15      Room Air  


 


8/4/17 15:13  87 18  93 Room Air  








 (Bertha Antonio PA-C)





Physical Exam


General Appearance:  no apparent distress


Eyes:  sclerae normal


ENT:  hearing grossly normal


Neck:  supple, no JVD, trachea midline


Respiratory/Chest:  lungs clear, normal breath sounds, no respiratory distress, 

no accessory muscle use


Cardiovascular:  regular rate, rhythm, no gallop, no murmur


Abdomen:  normal bowel sounds, non tender, + distended


Extremities:  no pedal edema, no calf tenderness


Neurologic/Psychiatric:  alert


Skin:  normal color, warm/dry


 (Bertha Antonio, PA-C)





Laboratory Results





Last 24 Hours








Test


  8/4/17


17:09 8/4/17


21:09 8/5/17


05:30 8/5/17


05:48


 


Bedside Glucose 111 mg/dl  132 mg/dl   109 mg/dl 


 


White Blood Count   11.57 K/uL  


 


Red Blood Count   3.43 M/uL  


 


Hemoglobin   10.7 g/dL  


 


Hematocrit   31.9 %  


 


Mean Corpuscular Volume   93.0 fL  


 


Mean Corpuscular Hemoglobin   31.2 pg  


 


Mean Corpuscular Hemoglobin


Concent 


  


  33.5 g/dl 


  


 


 


RDW Standard Deviation   47.5 fL  


 


RDW Coefficient of Variation   13.9 %  


 


Platelet Count   275 K/uL  


 


Mean Platelet Volume   9.4 fL  


 


Sodium Level   140 mmol/L  


 


Potassium Level   3.2 mmol/L  


 


Chloride Level   107 mmol/L  


 


Carbon Dioxide Level   28 mmol/L  


 


Anion Gap   5.0 mmol/L  


 


Blood Urea Nitrogen   10 mg/dl  


 


Creatinine   0.93 mg/dl  


 


Est Creatinine Clear Calc


Drug Dose 


  


  84.8 ml/min 


  


 


 


Estimated GFR (


American) 


  


  92.7 


  


 


 


Estimated GFR (Non-


American 


  


  80.0 


  


 


 


BUN/Creatinine Ratio   10.8  


 


Random Glucose   113 mg/dl  


 


Calcium Level   8.3 mg/dl  


 


Phosphorus Level   1.5 mg/dl  


 


Magnesium Level   1.8 mg/dl  


 


Total Bilirubin   1.0 mg/dl  


 


Direct Bilirubin   0.4 mg/dl  


 


Aspartate Amino Transf


(AST/SGOT) 


  


  30 U/L 


  


 


 


Alanine Aminotransferase


(ALT/SGPT) 


  


  22 U/L 


  


 


 


Alkaline Phosphatase   66 U/L  


 


Total Protein   5.5 gm/dl  


 


Albumin   2.6 gm/dl  


 


Prealbumin   11.0 mg/dl  


 


Triglycerides Level   74 mg/dl  


 


Cholesterol Level   74 mg/dl  


 


HDL Cholesterol   26 mg/dl  


 


LDL Cholesterol, Calculated   33 mg/dl  


 


VLDL Cholesterol, Calculated   15 mg/dl  


 


Cholesterol/HDL Ratio   2.8  


 


Lipase   316 U/L  








 (Bertha Antonio, PA-C)





Assessment and Plan


This is a 77 yo M with PMHx of T2DM with Diabetic Neuropathy, HTN, Gout, GIULIA on 

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 

anxiety disorder who presents with abdominal pain and bloating since Tuesday. 





Sigmoid Volvulus S/P Decompression 7/28 and 7/29 and S/P Sigmoid Colectomy 7/31


- Now with post-operative ileus with N/V and abdominal distension - NGT with 4 

L output since reinsertion


- PICC line consent obtained and will begin TPN this evening if electrolyte 

abnormalities improved


- Cipro 400 mg IV BID and Flagyl 500 mg IV Q8H on 7/28 to finish a 10 day 

course - D/C 8/7


- Ranitidine 50 mg IV Q8H


- General Surgery management appreciated


- PCA pump stopped - morphine 2mg IV q4h prn





Low Urine Output:


- Lasix x 2 days with diuresis - will use fluids due to ileus and N/V


- Follow UOP and straight cath is PVR >350 mL


   -- UOP - 675 yesterday - urine in [place] tubing looks less dark in comparison





T2DM with Diabetic Neuropathy: A1c 5.9 (2/22/17)


- Pt was recently taken off metformin by his PCP - continue to monitor and 

coverage if necessary - no coverage required so far





Hypokalemia: 


- Continue to monitor and replete as necessary





Parkinson's Disease/Restless Leg Syndrome: STABLE


-will have to hold Gabapentin 300 mg QID and Carbidopa/Levodopa 25/100 mg 1.5 

tabs po TID while NPO





HTN: 


- Oral medications with the additional Metoprolol on hold due to NPO - use 

Hydralazine and Vasotec PRN





Gout:


- Allopurinol 300 mg daily - will hold at this time





GIULIA:


- CPAP at night


- hold Clonazepam and Sertraline 25 mg daily again while NPO


- IV ativan prn to replace clonazepam to prevent withdrawal and for anxiety





DVT Prophylaxis: BERENICE/SCDs; withhold chemical prophylaxis for surgical 

intervention





Code Status: FULL RESUSCITATION





Disposition: SNF vs Rehab - uncertain on D/C date


Continued [facility] stay due to:  multiple IV medications needed


Discharge planning:  skilled nursing facility


 (Bertha Antonio PA-C)





Reviewed:  Pt Seen/Exam by Me


 (Mar Chandler MD)


History


Physician Assistant Supervision Note:





I interviewed and examined the patient. Discussed with CLEVELAND Antonio and agree with 

findings and plan as documented in the note. Any exceptions or clarifications 

are listed here: 





Much improved today with NGT in, no pain, is moving bowels, did pass moderate 

sized clot last night as per RN and I d/w Dr. Donato who said this is 

expected this time post-op as slough of clot from anastomosis site. Much more 

alert and clear mentally today since stopping morphine PCA





Vitals reviewed


NAD,sitting in bed


RRR no mgr


CTAB, breathing unlabored


Abd hypoactive BS, soft, NT, dressing over midline incision is c/d/i


[place] in place draining dark yellow urine


Ext: trace pitting edema bilat legs improved from yesterday, no calf tenderness

, 2+ DP pulses


Neuro: no further tremors in extremities, is more alert today and oriented x 3, 

answering all questions appropriately





77 yo male with a h/o PD, DMII, anxiety, HTN, gout, GIULIA on CPAP, here with 

sigmoid volvulus requiring sigmoid colectomy.


-continue NGT, NPO, and start TPN tonight


-is moving bowels now so tomorrow can hopefully clamp NG


-holding po meds, giving IV meds for BP and anxiety as needed


-watch UOP


-replace lytes








Documented By:  Mar Chandler


 (Mar Chandler MD)

## 2017-08-05 NOTE — SURGERY PROGRESS NOTE
Surgery Progress Note


Date of Service


Aug 5, 2017.





Subjective


Post OP Day:  5


pt more awake /alert.  looks/feels better since ngt placed. no bm yet.





Objective


Vital Signs:











  Date Time  Temp Pulse Resp B/P (MAP) Pulse Ox O2 Delivery O2 Flow Rate FiO2


 


8/5/17 08:10      Room Air  


 


8/5/17 07:47  75 18  93 Room Air  


 


8/5/17 07:01 36.9 81 16 143/73 (96) 92 Room Air  


 


8/5/17 03:26 37.0 97 16 153/67 (95) 96 Nasal Cannula 4.0 


 


8/4/17 22:48 36.5 86 16 148/53 (84) 96 Nasal Cannula 4.0 


 


8/4/17 20:40  89 18  92 Room Air  


 


8/4/17 19:25 36.9 91 19 122/61 (81) 92 Room Air  


 


8/4/17 19:16      Room Air  


 


8/4/17 15:20 37.4 94 18 167/80 (109) 96 Room Air  


 


8/4/17 15:15      Room Air  


 


8/4/17 15:13  87 18  93 Room Air  








Physical Exam:  nasogastric drainage (over 3000 cc's last 24 hours. )


General Appearance:  no apparent distress


Head:  normocephalic, atraumatic


Abdomen:  non tender, soft, + pertinent finding (mild distension but improved. )


Laboratory Results:





Results Past 24 Hours








Test


  8/4/17


17:09 8/4/17


21:09 8/5/17


05:30 8/5/17


05:48 Range/Units


 


 


Bedside Glucose 111 132  109 70-99  mg/dl


 


White Blood Count   11.57  4.8-10.8  K/uL


 


Red Blood Count   3.43  4.7-6.1  M/uL


 


Hemoglobin   10.7  14.0-18.0  g/dL


 


Hematocrit   31.9  42-52  %


 


Mean Corpuscular Volume   93.0    fL


 


Mean Corpuscular Hemoglobin   31.2  25-34  pg


 


Mean Corpuscular Hemoglobin


Concent 


  


  33.5


  


  32-36  g/dl


 


 


RDW Standard Deviation   47.5  36.4-46.3  fL


 


RDW Coefficient of Variation   13.9  11.5-14.5  %


 


Platelet Count   275  130-400  K/uL


 


Mean Platelet Volume   9.4  7.4-10.4  fL


 


Sodium Level   140  136-145  mmol/L


 


Potassium Level   3.2  3.5-5.1  mmol/L


 


Chloride Level   107    mmol/L


 


Carbon Dioxide Level   28  21-32  mmol/L


 


Anion Gap   5.0  3-11  mmol/L


 


Blood Urea Nitrogen   10  7-18  mg/dl


 


Creatinine


  


  


  0.93


  


  0.60-1.40


mg/dl


 


Est Creatinine Clear Calc


Drug Dose 


  


  84.8


  


   ml/min


 


 


Estimated GFR (


American) 


  


  92.7


  


   


 


 


Estimated GFR (Non-


American 


  


  80.0


  


   


 


 


BUN/Creatinine Ratio   10.8  10-20  


 


Random Glucose   113  70-99  mg/dl


 


Calcium Level   8.3  8.5-10.1  mg/dl


 


Phosphorus Level   1.5  2.5-4.9  mg/dl


 


Magnesium Level   1.8  1.8-2.4  mg/dl


 


Total Bilirubin   1.0  0.2-1  mg/dl


 


Direct Bilirubin   0.4  0-0.2  mg/dl


 


Aspartate Amino Transf


(AST/SGOT) 


  


  30


  


  15-37  U/L


 


 


Alanine Aminotransferase


(ALT/SGPT) 


  


  22


  


  12-78  U/L


 


 


Alkaline Phosphatase   66    U/L


 


Total Protein   5.5  6.4-8.2  gm/dl


 


Albumin   2.6  3.4-5.0  gm/dl


 


Prealbumin   11.0  20-40  mg/dl


 


Triglycerides Level   74  0-150  mg/dl


 


Cholesterol Level   74  0-200  mg/dl


 


HDL Cholesterol   26   mg/dl


 


LDL Cholesterol, Calculated   33   mg/dl


 


VLDL Cholesterol, Calculated   15   mg/dl


 


Cholesterol/HDL Ratio   2.8   


 


Lipase   316    U/L


 


Test


  8/5/17


14:01 


  


  


  Range/Units


 











Assessment & Plan


8/5/17


post op ileus. improved clinically since placing ngt. keep for now. will 

consider clamp trial tomorrow


awaiting bowel fx


TPN starting tonight which I agree with


pain control adequate











8/4/17


doing ok but we need to get him moving


d/c pca herber basal rate


PT/OT


? can we d/c [place]


awaiting bowel fx








8/3/27


doing well clinically


awaiting bowel fx


Bebeto serous


may increase to full liquids


follow wbc








8/1/17


sigmoid volvulus


   partially decompressed after endoscopy x2 and rectal tube placement


   high likelihood of recurrence once tube is removed


   will proceed with sigmoid colectomy this afternoon





as above. discussed with pt and his daughter. discussed options/risks. 

discussed high likelihood of recurrence.  discussed surgical risks ( bleeding/

infection/dvt/pe/mi/leaks/injury to other organs such as ureter/bladder/small 

bowel etc...).  answered questions. will proceed with open sigmoidectomy this 

afternoon.


8/4/17


doing ok but we need to get him moving


d/c pca herber basal rate


PT/OT


? can we d/c [place]


awaiting bowel fx








8/3/27


doing well clinically


awaiting bowel fx


Bebeto serous


may increase to full liquids


follow wbc








8/1/17


sigmoid volvulus


   partially decompressed after endoscopy x2 and rectal tube placement


   high likelihood of recurrence once tube is removed


   will proceed with sigmoid colectomy this afternoon





as above. discussed with pt and his daughter. discussed options/risks. 

discussed high likelihood of recurrence.  discussed surgical risks ( bleeding/

infection/dvt/pe/mi/leaks/injury to other organs such as ureter/bladder/small 

bowel etc...).  answered questions. will proceed with open sigmoidectomy this 

afternoon.

## 2017-08-06 VITALS
HEART RATE: 79 BPM | OXYGEN SATURATION: 95 % | DIASTOLIC BLOOD PRESSURE: 82 MMHG | SYSTOLIC BLOOD PRESSURE: 128 MMHG | TEMPERATURE: 98.6 F

## 2017-08-06 VITALS
SYSTOLIC BLOOD PRESSURE: 144 MMHG | HEART RATE: 60 BPM | DIASTOLIC BLOOD PRESSURE: 60 MMHG | OXYGEN SATURATION: 96 % | TEMPERATURE: 98.96 F

## 2017-08-06 VITALS
HEART RATE: 79 BPM | DIASTOLIC BLOOD PRESSURE: 70 MMHG | TEMPERATURE: 98.96 F | SYSTOLIC BLOOD PRESSURE: 168 MMHG | OXYGEN SATURATION: 97 %

## 2017-08-06 VITALS
DIASTOLIC BLOOD PRESSURE: 72 MMHG | OXYGEN SATURATION: 93 % | HEART RATE: 78 BPM | SYSTOLIC BLOOD PRESSURE: 150 MMHG | TEMPERATURE: 98.42 F

## 2017-08-06 LAB
ANION GAP SERPL CALC-SCNC: 6 MMOL/L (ref 3–11)
BUN SERPL-MCNC: 10 MG/DL (ref 7–18)
BUN/CREAT SERPL: 12 (ref 10–20)
CALCIUM SERPL-MCNC: 7.6 MG/DL (ref 8.5–10.1)
CHLORIDE SERPL-SCNC: 108 MMOL/L (ref 98–107)
CO2 SERPL-SCNC: 28 MMOL/L (ref 21–32)
CREAT CL PREDICTED SERPL C-G-VRATE: 93.8 ML/MIN
CREAT SERPL-MCNC: 0.83 MG/DL (ref 0.6–1.4)
EOSINOPHIL NFR BLD AUTO: 284 K/UL (ref 130–400)
GLUCOSE SERPL-MCNC: 123 MG/DL (ref 70–99)
HCT VFR BLD CALC: 32.6 % (ref 42–52)
MAGNESIUM SERPL-MCNC: 2.1 MG/DL (ref 1.8–2.4)
MCH RBC QN AUTO: 29.5 PG (ref 25–34)
MCHC RBC AUTO-ENTMCNC: 31.9 G/DL (ref 32–36)
MCV RBC AUTO: 92.6 FL (ref 80–100)
PHOSPHATE SERPL-MCNC: 2.3 MG/DL (ref 2.5–4.9)
PMV BLD AUTO: 9.5 FL (ref 7.4–10.4)
POTASSIUM SERPL-SCNC: 3.3 MMOL/L (ref 3.5–5.1)
RBC # BLD AUTO: 3.52 M/UL (ref 4.7–6.1)
SODIUM SERPL-SCNC: 142 MMOL/L (ref 136–145)
WBC # BLD AUTO: 10.54 K/UL (ref 4.8–10.8)

## 2017-08-06 RX ADMIN — Medication PRN ML: at 21:54

## 2017-08-06 RX ADMIN — METRONIDAZOLE SCH MLS/HR: 500 INJECTION, SOLUTION INTRAVENOUS at 02:01

## 2017-08-06 RX ADMIN — Medication PRN ML: at 06:08

## 2017-08-06 RX ADMIN — CIPROFLOXACIN SCH MLS/HR: 2 INJECTION, SOLUTION INTRAVENOUS at 19:50

## 2017-08-06 RX ADMIN — METRONIDAZOLE SCH MLS/HR: 500 INJECTION, SOLUTION INTRAVENOUS at 09:38

## 2017-08-06 RX ADMIN — CARBIDOPA AND LEVODOPA SCH TAB: 25; 100 TABLET ORAL at 14:00

## 2017-08-06 RX ADMIN — INSULIN ASPART SCH UNITS: 100 INJECTION, SOLUTION INTRAVENOUS; SUBCUTANEOUS at 12:00

## 2017-08-06 RX ADMIN — Medication PRN ML: at 04:11

## 2017-08-06 RX ADMIN — CARBIDOPA AND LEVODOPA SCH TAB: 25; 100 TABLET ORAL at 20:53

## 2017-08-06 RX ADMIN — INSULIN ASPART SCH UNITS: 100 INJECTION, SOLUTION INTRAVENOUS; SUBCUTANEOUS at 05:54

## 2017-08-06 RX ADMIN — CIPROFLOXACIN SCH MLS/HR: 2 INJECTION, SOLUTION INTRAVENOUS at 08:19

## 2017-08-06 RX ADMIN — CLONAZEPAM SCH MG: 0.5 TABLET ORAL at 20:53

## 2017-08-06 RX ADMIN — INSULIN ASPART SCH UNITS: 100 INJECTION, SOLUTION INTRAVENOUS; SUBCUTANEOUS at 18:00

## 2017-08-06 RX ADMIN — LORAZEPAM PRN MLS/MIN: 2 INJECTION INTRAMUSCULAR; INTRAVENOUS at 20:08

## 2017-08-06 RX ADMIN — METRONIDAZOLE SCH MLS/HR: 500 INJECTION, SOLUTION INTRAVENOUS at 18:14

## 2017-08-06 NOTE — SURGERY PROGRESS NOTE
Surgery Progress Note


Date of Service


Aug 6, 2017.





Subjective


pt had large bm last night.  somewhat confused but abdomen looks much better.





Objective


Vital Signs:











  Date Time  Temp Pulse Resp B/P (MAP) Pulse Ox O2 Delivery O2 Flow Rate FiO2


 


8/6/17 07:11 37.2 79 16 168/70 (102) 97  2.0 


 


8/5/17 23:57      Nasal Cannula 2.0 


 


8/5/17 23:13 37.2 68 16 139/65 (89) 96 Nasal Cannula 2.0 


 


8/5/17 21:24 37.0 70 24 155/72 (99) 94 Room Air  


 


8/5/17 18:15      Room Air  


 


8/5/17 16:00      Room Air  


 


8/5/17 15:48 37.1 64 16 148/66 (93) 96 Room Air  








Physical Exam:  nasogastric drainage (decreased ( 360 cc))


General Appearance:  no apparent distress


Abdomen:  non tender, soft, + pertinent finding (still some distension but 

decreased. )


Incision(s):  clean, dry, intact, findings (CEDRICK serous fluid draining around it/

clogged. pulled today. )


Laboratory Results:





Results Past 24 Hours








Test


  8/5/17


12:03 8/5/17


14:01 8/5/17


18:00 8/5/17


23:40 Range/Units


 


 


Bedside Glucose 121  121 126 70-99  mg/dl


 


Potassium Level  3.5   3.5-5.1  mmol/L


 


Phosphorus Level  2.5   2.5-4.9  mg/dl


 


Magnesium Level  1.9   1.8-2.4  mg/dl


 


Test


  8/6/17


05:50 8/6/17


06:07 


  


  Range/Units


 


 


Bedside Glucose 121    70-99  mg/dl


 


White Blood Count  10.54   4.8-10.8  K/uL


 


Red Blood Count  3.52   4.7-6.1  M/uL


 


Hemoglobin  10.4   14.0-18.0  g/dL


 


Hematocrit  32.6   42-52  %


 


Mean Corpuscular Volume  92.6     fL


 


Mean Corpuscular Hemoglobin  29.5   25-34  pg


 


Mean Corpuscular Hemoglobin


Concent 


  31.9


  


  


  32-36  g/dl


 


 


RDW Standard Deviation  46.8   36.4-46.3  fL


 


RDW Coefficient of Variation  13.9   11.5-14.5  %


 


Platelet Count  284   130-400  K/uL


 


Mean Platelet Volume  9.5   7.4-10.4  fL


 


Sodium Level  142   136-145  mmol/L


 


Potassium Level  3.3   3.5-5.1  mmol/L


 


Chloride Level  108     mmol/L


 


Carbon Dioxide Level  28   21-32  mmol/L


 


Anion Gap  6.0   3-11  mmol/L


 


Blood Urea Nitrogen  10   7-18  mg/dl


 


Creatinine


  


  0.83


  


  


  0.60-1.40


mg/dl


 


Est Creatinine Clear Calc


Drug Dose 


  93.8


  


  


   ml/min


 


 


Estimated GFR (


American) 


  99.8


  


  


   


 


 


Estimated GFR (Non-


American 


  86.1


  


  


   


 


 


BUN/Creatinine Ratio  12.0   10-20  


 


Random Glucose  123   70-99  mg/dl


 


Calcium Level  7.6   8.5-10.1  mg/dl


 


Phosphorus Level  2.3   2.5-4.9  mg/dl


 


Magnesium Level  2.1   1.8-2.4  mg/dl











Assessment & Plan


8/6/17


will obtain ngt clamp trial, if ok will pull ngt and obtain bedside swallow test

/start liquids


cont TPN for now


H/H stable


PT/OT


wound looks good. CEDRICK pulled today. 








8/5/17


post op ileus. improved clinically since placing ngt. keep for now. will 

consider clamp trial tomorrow


awaiting bowel fx


TPN starting tonight which I agree with


pain control adequate











8/4/17


doing ok but we need to get him moving


d/c pca herber basal rate


PT/OT


? can we d/c vallejo


awaiting bowel fx








8/3/27


doing well clinically


awaiting bowel fx


Cedrick serous


may increase to full liquids


follow wbc








8/1/17


sigmoid volvulus


   partially decompressed after endoscopy x2 and rectal tube placement


   high likelihood of recurrence once tube is removed


   will proceed with sigmoid colectomy this afternoon





as above. discussed with pt and his daughter. discussed options/risks. 

discussed high likelihood of recurrence.  discussed surgical risks ( bleeding/

infection/dvt/pe/mi/leaks/injury to other organs such as ureter/bladder/small 

bowel etc...).  answered questions. will proceed with open sigmoidectomy this 

afternoon.


8/5/17


post op ileus. improved clinically since placing ngt. keep for now. will 

consider clamp trial tomorrow


awaiting bowel fx


TPN starting tonight which I agree with


pain control adequate











8/4/17


doing ok but we need to get him moving


d/c pca herber basal rate


PT/OT


? can we d/c vallejo


awaiting bowel fx








8/3/27


doing well clinically


awaiting bowel fx


Cedrick serous


may increase to full liquids


follow wbc








8/1/17


sigmoid volvulus


   partially decompressed after endoscopy x2 and rectal tube placement


   high likelihood of recurrence once tube is removed


   will proceed with sigmoid colectomy this afternoon





as above. discussed with pt and his daughter. discussed options/risks. 

discussed high likelihood of recurrence.  discussed surgical risks ( bleeding/

infection/dvt/pe/mi/leaks/injury to other organs such as ureter/bladder/small 

bowel etc...).  answered questions. will proceed with open sigmoidectomy this 

afternoon.

## 2017-08-06 NOTE — PHARMACY PROGRESS NOTE
Parenteral Nutrition Consult


Date of Service


Aug 6, 2017.





Scope


Pharmacy has been consulted to manage parenteral nutrition orders and order 

appropriate labs.  As part of the Nutrition Support Team guidelines, pharmacy 

will work in conjunction with dietary when determining the patients caloric 

needs.





Subjective


The patient is a 76 year old male admitted on Jul 27, 2017 at 15:52 for 

Volvulus.  Patient is to receive parenteral nutrition for post op ileus





Objective


Height (Feet):  6


Height (Inches):  0.00


Weight (Kilograms):  99.200


Diet:  NPO


Vascular Access:


picc


Intake & Output (Last 72 Hr):











 8/5/17 8/6/17 8/7/17





 08:00 08:00 08:00


 


Intake Total 2360 ml 4452 ml 1425 ml


 


Output Total 5320 ml 4125 ml 750 ml


 


Balance -2960 ml 327 ml 675 ml








Laboratory Data (Last 24 Hr):











Test


  8/6/17


06:07


 


Blood Urea Nitrogen


  10 mg/dl


(7-18)


 


Calcium Level


  7.6 mg/dl


(8.5-10.1)


 


Carbon Dioxide Level


  28 mmol/L


(21-32)


 


Chloride Level


  108 mmol/L


()


 


Creatinine


  0.83 mg/dl


(0.60-1.40)


 


Magnesium Level


  2.1 mg/dl


(1.8-2.4)


 


Phosphorus Level


  2.3 mg/dl


(2.5-4.9)


 


Potassium Level


  3.3 mmol/L


(3.5-5.1)


 


Random Glucose


  123 mg/dl


(70-99)


 


Sodium Level


  142 mmol/L


(136-145)








Nutrition Assessment


Please refer to the Notes section of the EMR for the most recent dietician note.





Plan


Today is day #2 of TPN management. Max total fluid volume per MD ~2000ml, min 

total fluid volume ~1500 ml/day. TPN changed to volume based today to maintain ~

1500 ml total fluid.





Goal Macronutrients:


Amino acids: 125 g/day


Dextrose: 250 g/day


Lipids: 50 g


Total kcal goal: ~1850 kcal/day








Macronutrients 8/6


Amino acids  100 grams/day - decreased travasol today due to increasing Cl


Dextrose 175 grams/day


Lipids 0 grams/day





Micronutrients


Sodium acetate 15 mEq


Potassium phosphate 30  mMol - both k and phos low today, 21 mmol given this am 

will increase to 30 mM in tpn


Potassium acetate 15 mEq


Magnesium sulfate 16.24 mEq


Calcium gluconate 4.65 mEq


Multivitamins 10 mL


Trace Elements 10 mL


Additional additives: pepcid add to tpn (pt was on zantac)





Total volume 1500 mL to be infused over 24  hrs will provide 995 kcal/day





Labs to be ordered per PN order protocol





 


Pharmacy will follow and adjust parenteral nutrition orders on a daily basis.  

Thank you.

## 2017-08-06 NOTE — HOSPITALIST PROGRESS NOTE
Hospitalist Progress Note


Date of Service


Aug 6, 2017.


 (Bertha Antonio PA-C)





Subjective


Pt evaluation today including:  conversation w/ patient, conversation w/ family

, physical exam, chart review, lab review, review of studies, review of 

inpatient medication list


Pain:  None


PO Intake:  NPO


Voiding:  vallejo catheter in place


Patient seen and evaluated.  Multiple visits to patient today.


On initial visit, daughter was at bedside and patient was intermittently 

confused with mild tremors.  He followed commands and answer questions 

appropriately but kept tugging at his sheets and socks stating they were "too 

long" to go home like that.


NGT with less output and abdominal distention markedly improved.  Patient is 

having bowel movements but states he is not passing gas.





On second visit, son is at bedside and notified nursing staff the patient 

appeared to be convulsing.  Nursing staff noted an irregular heart rate and 

stat EKG was ordered.


Upon arriving to the room, patient was being ambulated to the bed with max 

assist of 4 people.  Patient was unable to contribute by any means for transfer.


Patient again verbalizes no complaints including chest pain, shortness of breath

, abdominal pain.  Patient is noted to be tremorous in both upper extremities 

and lower extremities.


EKG obtained with sinus rhythm and occasional PACs.  No evidence of ischemic 

changes noted and compared to previous EKG from admission without drastic 

changes.


Patient continued to have significant tremors is more lethargic and more 

confused during this visit.  Patient appears to be reaching for objects in the 

air and mumbling.


Nursing staff reports patient did not sleep through the night.  Medications 

reviewed and no Ativan was given recently.


Suspect this is likely withdraw from inability to provide by mouth medications 

including Sinemet, Zoloft, clonazepam.





   Constitutional:  No fever, No chills


   Respiratory:  No cough, No shortness of breath


   Cardiovascular:  No chest pain, No palpitations


   Abdomen:  No pain, No nausea, No vomiting


   Musculoskeletal:  + problem reported (tremors)


   Male :  No dysuria (Bertha Antonio PA-C)





Medications





Current Inpatient Medications








 Medications


  (Trade)  Dose


 Ordered  Sig/Kalpesh


 Route  Start Time


 Stop Time Status Last Admin


Dose Admin


 


 Promethazine HCl


 12.5 mg/Sodium


 Chloride  50.5 ml @ 


 204 mls/hr  Q6H  PRN


 IV  7/27/17 16:00


 8/26/17 15:59  8/4/17 23:16


204 MLS/HR


 


 Ondansetron HCl


  (Zofran Inj)  4 mg  Q6H  PRN


 IV  7/27/17 16:00


 8/26/17 15:59  8/4/17 16:28


4 MG


 


 Ciprofloxacin/


 Dextrose 400 mg/


 Prmx  200 ml @ 


 100 mls/hr  Q12H


 IV  7/28/17 16:00


 8/7/17 20:59  8/6/17 08:19


100 MLS/HR


 


 Metronidazole 500


 mg/Prmx  100 ml @ 


 100 mls/hr  Q8H


 IV  7/28/17 18:00


 8/7/17 17:59  8/6/17 09:38


100 MLS/HR


 


 Albuterol/


 Ipratropium


  (Duoneb)  3 ml  Q2R  PRN


 INH  7/30/17 08:15


 8/29/17 08:14   


 


 


 Hydralazine HCl


  (HydrALAZINE INJ)  10 mg  Q6H  PRN


 IV.  7/31/17 08:30


 8/30/17 08:29  8/4/17 16:40


10 MG


 


 Naloxone HCl


  (Narcan Inj)  0.1 mg  Q5M  PRN


 IV  7/31/17 16:30


 8/30/17 16:29   


 


 


 Gabapentin


  (Neurontin Cap)  300 mg  QID


 PO  8/2/17 17:00


 9/1/17 16:59 Future Hold 8/4/17 13:35


300 MG


 


 Ranitidine HCl


  (zANTac TAB)  150 mg  BID


 PO  8/2/17 21:00


 9/1/17 20:59 Future Hold 8/4/17 07:14


150 MG


 


 Sertraline HCl


  (Zoloft Tab)  25 mg  DAILY


 PO  8/3/17 09:00


 9/2/17 08:59 Future Hold 8/4/17 07:14


25 MG


 


 Carbidopa/Levodopa


  (Sinemet 25/


 100MG Tab)  1.5 tab  TID


 PO  8/2/17 21:00


 8/26/17 20:59 Future Hold 8/4/17 13:35


1.5 TAB


 


 Clonazepam


  (Klonopin Tab)  0.5 mg  HS


 PO  8/2/17 21:00


 9/1/17 14:14 Future Hold 8/3/17 19:49


0.5 MG


 


 Metoprolol


 Tartrate


  (Lopressor Tab)  12.5 mg  BID


 PO  8/4/17 10:00


 9/3/17 09:59 Future Hold 8/4/17 09:07


12.5 MG


 


 Allopurinol


  (Zyloprim Tab)  300 mg  DAILY


 PO  8/4/17 10:00


 9/3/17 09:59 Future Hold 8/4/17 09:07


300 MG


 


 Aspirin


  (Ecotrin Tab)  81 mg  DAILY


 PO  8/4/17 10:00


 9/3/17 09:59 Future Hold 8/4/17 09:06


81 MG


 


 Triamterene/HCTZ


  (Maxzide 37.5/25


 Tab)  0.5 tab  DAILY


 PO  8/4/17 10:00


 9/3/17 09:59 Future Hold 8/4/17 09:06


0.5 TAB


 


 Cholecalciferol


  (Vitamin D Tab)  2,000


 inter.unit  DAILY


 PO  8/4/17 10:00


 9/3/17 09:59 Future Hold 8/4/17 09:07


2,000 INTER.UNIT


 


 Cyanocobalamin


  (Vitamin B-12


 Tab)  1,000 mcg  DAILY


 PO  8/4/17 10:00


 9/3/17 09:59 Future Hold 8/4/17 09:07


1,000 MCG


 


 Morphine Sulfate


  (MoRPHine


 SULFATE INJ)  2 mg  Q4H  PRN


 IV  8/4/17 17:00


 8/18/17 16:59   


 


 


 Miscellaneous


 Information


  (Pharmacy Tpn/


 Ppn Consult


 Active)  1 ea  UD  PRN


 N/A  8/4/17 17:15


 9/3/17 17:14   


 


 


 Enalaprilat 0.625


 mg/Dextrose  25.5 ml @ 


 100 mls/hr  Q8H  PRN


 IV  8/4/17 17:15


 9/3/17 17:14   


 


 


 Insulin Aspart


  (novoLOG ASPART)  SLIDING


 SCALE


 PARAMETER  Q6


 SC  8/5/17 06:00


 9/4/17 05:59   


 


 


 Lorazepam 0.5 mg/


 Syringe  0.5 ml @ 


 0.5 mls/min  Q6H  PRN


 IV  8/4/17 23:45


 9/3/17 23:44  8/4/17 23:51


0.5 MLS/MIN


 


 Nutrition


  (Parenteral)  0 ml @ 0


 mls/hr  TODAY@1600


 IV  8/5/17 16:00


 8/6/17 15:59  8/5/17 16:34


63 MLS/HR


 


 Dextrose  1,000 ml @ 


 0 mls/hr  Q0M PRN


 IV  8/5/17 16:00


 9/4/17 15:59   


 


 


 Heparin Sodium


  (Porcine)


  (Heparin 10 Unit/


 ml 5 ml Flush)  5 ml  PRN  PRN


 FLUSH  8/6/17 02:15


 9/5/17 02:14  8/6/17 06:08


10 ML


 


 Nutrition


  (Parenteral)  0 ml @ 0


 mls/hr  TODAY@1600


 IV  8/6/17 16:00


 8/7/17 15:59   


 


 


 Haloperidol


 Lactate


  (Haldol Inj)  5 mg  Q24H  PRN


 IM  8/6/17 13:30


 9/5/17 13:29 UNV  


 








 (Bertha Antonio PA-C)





Objective


Vital Signs











  Date Time  Temp Pulse Resp B/P (MAP) Pulse Ox O2 Delivery O2 Flow Rate FiO2


 


8/6/17 11:47 36.9 78 16 150/72 (98) 93  2.0 


 


8/6/17 07:11 37.2 79 16 168/70 (102) 97  2.0 


 


8/5/17 23:57      Nasal Cannula 2.0 


 


8/5/17 23:13 37.2 68 16 139/65 (89) 96 Nasal Cannula 2.0 


 


8/5/17 21:24 37.0 70 24 155/72 (99) 94 Room Air  


 


8/5/17 18:15      Room Air  


 


8/5/17 16:00      Room Air  


 


8/5/17 15:48 37.1 64 16 148/66 (93) 96 Room Air  








 (Bertha Antonio PA-C)





Physical Exam


General Appearance:  + moderate distress (restless)


Eyes:  sclerae normal


ENT:  hearing grossly normal


Neck:  supple, no JVD, trachea midline


Respiratory/Chest:  lungs clear, normal breath sounds, no respiratory distress, 

no accessory muscle use


Cardiovascular:  regular rate, rhythm, no gallop, no murmur


Abdomen:  non tender, soft, + abnormal bowel sounds (hypoactive)


Neurologic/Psychiatric:  + pertinent finding (initially alert and oriented with 

intermittent confusion; currently more lethargic and confused)


Skin:  normal color, warm/dry


 (Bertha Antonio PA-C)





Laboratory Results





Last 24 Hours








Test


  8/5/17


14:01 8/5/17


18:00 8/5/17


23:40 8/6/17


05:50


 


Potassium Level 3.5 mmol/L    


 


Phosphorus Level 2.5 mg/dl    


 


Magnesium Level 1.9 mg/dl    


 


Bedside Glucose  121 mg/dl  126 mg/dl  121 mg/dl 


 


Test


  8/6/17


06:07 8/6/17


11:53 


  


 


 


White Blood Count 10.54 K/uL    


 


Red Blood Count 3.52 M/uL    


 


Hemoglobin 10.4 g/dL    


 


Hematocrit 32.6 %    


 


Mean Corpuscular Volume 92.6 fL    


 


Mean Corpuscular Hemoglobin 29.5 pg    


 


Mean Corpuscular Hemoglobin


Concent 31.9 g/dl 


  


  


  


 


 


RDW Standard Deviation 46.8 fL    


 


RDW Coefficient of Variation 13.9 %    


 


Platelet Count 284 K/uL    


 


Mean Platelet Volume 9.5 fL    


 


Sodium Level 142 mmol/L    


 


Potassium Level 3.3 mmol/L    


 


Chloride Level 108 mmol/L    


 


Carbon Dioxide Level 28 mmol/L    


 


Anion Gap 6.0 mmol/L    


 


Blood Urea Nitrogen 10 mg/dl    


 


Creatinine 0.83 mg/dl    


 


Est Creatinine Clear Calc


Drug Dose 93.8 ml/min 


  


  


  


 


 


Estimated GFR (


American) 99.8 


  


  


  


 


 


Estimated GFR (Non-


American 86.1 


  


  


  


 


 


BUN/Creatinine Ratio 12.0    


 


Random Glucose 123 mg/dl    


 


Calcium Level 7.6 mg/dl    


 


Phosphorus Level 2.3 mg/dl    


 


Magnesium Level 2.1 mg/dl    


 


Bedside Glucose  106 mg/dl   








 (Bertha Antonio, PA-C)





Assessment and Plan


This is a 77 yo M with PMHx of T2DM with Diabetic Neuropathy, HTN, Gout, GIULIA on 

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 

anxiety disorder who presents with abdominal pain and bloating since Tuesday. 





Sigmoid Volvulus S/P Decompression 7/28 and 7/29 and S/P Sigmoid Colectomy 7/31


- Resolving post-operative ileus - less distension and less NGT output - NGT 

clamped and possible removal pending residuals


- PICC line placed with TPN supplied


- Cipro 400 mg IV BID and Flagyl 500 mg IV Q8H on 7/28 to finish a 10 day 

course - D/C 8/7


- Ranitidine 50 mg IV Q8H


- General Surgery management appreciated - plan for possible NGT removal


- PCA pump stopped - morphine 2mg IV q4h PRN





Encephalopathy - Delirium vs Medication Withdrawal: WAX AND WANES


- Suspect withdrawal symptoms as unable to give Sinemet, Zoloft, and Clonazepam 

- patient without Ativan since 8/4 


- Will reinstitute medications when able to help with symptoms


- EKG obtained - NSR with PACs without ischemic changes


- Haldol x 1 dose given





Low Urine Output:


- Lasix x 2 days with diuresis but improved with reinstitution of fluids - now 

currently D/Cd


- Follow UOP and straight cath is PVR >350 mL


   -- UOP - 1200 so far today - urine in [place] tubing looks less dark in 

comparison





T2DM with Diabetic Neuropathy: A1c 5.9 (2/22/17)


- Pt was recently taken off metformin by his PCP - continue to monitor and 

coverage if necessary - no coverage required so far





Hypokalemia and Hypophosphatemia: 


- Continue to monitor and replete as necessary





Parkinson's Disease/Restless Leg Syndrome: WORSENING


- Held Gabapentin 300 mg QID and Carbidopa/Levodopa 25/100 mg 1.5 tabs po TID 

while NPO





HTN: 


- Oral medications with the additional Metoprolol on hold due to NPO - use 

Hydralazine and Vasotec PRN





Gout:


- Allopurinol 300 mg daily - will hold at this time





GIULIA:


- CPAP at night


- hold Clonazepam and Sertraline 25 mg daily again while NPO


- IV Ativan PRN to replace clonazepam to prevent withdrawal and for anxiety





DVT Prophylaxis: BERENICE/SCDs; withhold chemical prophylaxis for surgical 

intervention





Code Status: FULL RESUSCITATION





Disposition: SNF vs Rehab - uncertain on D/C date


- Discussed with daughter today who is still adamant of patient returning home 

- was previously at [facility]


   -- Patient was max assist of 4 staff members to only stand and pivot from 

chair to bed - at this time he is unsafe to return home not only for himself 

but family as well


Continued [facility] stay due to:  multiple IV medications needed


Discharge planning:  uncertain (SNF vs Rehab)


 (Bertha Antonio, PA-C)





Reviewed:  Pt Seen/Exam by Me


 (Mar Chandler MD)


History


Physician Assistant Supervision Note:





I interviewed and examined the patient. Discussed with C[place]AND Antonio and agree with 

findings and plan as documented in the note. Any exceptions or clarifications 

are listed here: 





Had acute delirium today, shaking more, likely from withdrawal from SInemet, 

clonazepam, could be from residual pain meds. Gave ativan and had no 

improvement. Was quite agitated, hallucinating, grabbing at things not there, 

pulling and tugging on lines. Given IM Haldol and had significant improvement 

in symptoms/.





Vitals reviewed


NAD,lying in bed and restless, agitated, grabbing his son's arm and pulling it 

hard towards himself


RRR no mgr


CTAB, breathing unlabored


Abd +BS, soft, NT, dressing over midline incision is c/d/i


[place] in place draining dark yellow urine


Ext: no edema,no calf tenderness, 2+ DP pulses


Neuro: oriented to place and person, otherwise keeps eyes closed, does follow 

some commands briefly, moving all extremities, no facial droop





77 yo male with a h/o PD, DMII, anxiety, HTN, gout, GIULIA on CPAP, here with 

sigmoid volvulus requiring sigmoid colectomy, and prolonged course with ileus 

and now acute delirium


-continue TPN but could possibly take a diet today as NGT pulled


-restart homee po meds


-watch UOP


-replace lytes


-Haldol prn








Documented By:  Mar Chandler


 (Mar Chandler MD)

## 2017-08-07 VITALS
TEMPERATURE: 98.06 F | OXYGEN SATURATION: 94 % | DIASTOLIC BLOOD PRESSURE: 79 MMHG | SYSTOLIC BLOOD PRESSURE: 152 MMHG | HEART RATE: 69 BPM

## 2017-08-07 VITALS
OXYGEN SATURATION: 95 % | SYSTOLIC BLOOD PRESSURE: 143 MMHG | DIASTOLIC BLOOD PRESSURE: 72 MMHG | TEMPERATURE: 98.24 F | HEART RATE: 68 BPM

## 2017-08-07 VITALS
SYSTOLIC BLOOD PRESSURE: 161 MMHG | DIASTOLIC BLOOD PRESSURE: 71 MMHG | OXYGEN SATURATION: 95 % | TEMPERATURE: 97.7 F | HEART RATE: 63 BPM

## 2017-08-07 VITALS — DIASTOLIC BLOOD PRESSURE: 67 MMHG | SYSTOLIC BLOOD PRESSURE: 143 MMHG

## 2017-08-07 LAB
ANION GAP SERPL CALC-SCNC: 6 MMOL/L (ref 3–11)
BUN SERPL-MCNC: 16 MG/DL (ref 7–18)
BUN/CREAT SERPL: 19.6 (ref 10–20)
CALCIUM SERPL-MCNC: 7.8 MG/DL (ref 8.5–10.1)
CHLORIDE SERPL-SCNC: 109 MMOL/L (ref 98–107)
CO2 SERPL-SCNC: 26 MMOL/L (ref 21–32)
CREAT CL PREDICTED SERPL C-G-VRATE: 96.1 ML/MIN
CREAT SERPL-MCNC: 0.81 MG/DL (ref 0.6–1.4)
EOSINOPHIL NFR BLD AUTO: 265 K/UL (ref 130–400)
GLUCOSE SERPL-MCNC: 111 MG/DL (ref 70–99)
HCT VFR BLD CALC: 31.6 % (ref 42–52)
MAGNESIUM SERPL-MCNC: 2.2 MG/DL (ref 1.8–2.4)
MCH RBC QN AUTO: 30.1 PG (ref 25–34)
MCHC RBC AUTO-ENTMCNC: 32.3 G/DL (ref 32–36)
MCV RBC AUTO: 93.2 FL (ref 80–100)
PHOSPHATE SERPL-MCNC: 2.7 MG/DL (ref 2.5–4.9)
PMV BLD AUTO: 9.6 FL (ref 7.4–10.4)
POTASSIUM SERPL-SCNC: 3.5 MMOL/L (ref 3.5–5.1)
RBC # BLD AUTO: 3.39 M/UL (ref 4.7–6.1)
SODIUM SERPL-SCNC: 141 MMOL/L (ref 136–145)
WBC # BLD AUTO: 10.69 K/UL (ref 4.8–10.8)

## 2017-08-07 RX ADMIN — INSULIN ASPART SCH UNITS: 100 INJECTION, SOLUTION INTRAVENOUS; SUBCUTANEOUS at 12:00

## 2017-08-07 RX ADMIN — CARBIDOPA AND LEVODOPA SCH TAB: 25; 100 TABLET ORAL at 10:28

## 2017-08-07 RX ADMIN — INSULIN ASPART SCH UNITS: 100 INJECTION, SOLUTION INTRAVENOUS; SUBCUTANEOUS at 21:00

## 2017-08-07 RX ADMIN — GABAPENTIN SCH MG: 300 CAPSULE ORAL at 10:37

## 2017-08-07 RX ADMIN — CIPROFLOXACIN SCH MLS/HR: 2 INJECTION, SOLUTION INTRAVENOUS at 20:41

## 2017-08-07 RX ADMIN — METRONIDAZOLE SCH MLS/HR: 500 INJECTION, SOLUTION INTRAVENOUS at 01:29

## 2017-08-07 RX ADMIN — GABAPENTIN SCH MG: 300 CAPSULE ORAL at 11:08

## 2017-08-07 RX ADMIN — CLONAZEPAM SCH MG: 0.5 TABLET ORAL at 20:52

## 2017-08-07 RX ADMIN — GABAPENTIN SCH MG: 300 CAPSULE ORAL at 16:54

## 2017-08-07 RX ADMIN — Medication PRN ML: at 06:00

## 2017-08-07 RX ADMIN — SERTRALINE HYDROCHLORIDE SCH MG: 50 TABLET, FILM COATED ORAL at 10:28

## 2017-08-07 RX ADMIN — GABAPENTIN SCH MG: 300 CAPSULE ORAL at 13:09

## 2017-08-07 RX ADMIN — CARBIDOPA AND LEVODOPA SCH TAB: 25; 100 TABLET ORAL at 14:14

## 2017-08-07 RX ADMIN — INSULIN ASPART SCH UNITS: 100 INJECTION, SOLUTION INTRAVENOUS; SUBCUTANEOUS at 06:00

## 2017-08-07 RX ADMIN — CARBIDOPA AND LEVODOPA SCH TAB: 25; 100 TABLET ORAL at 20:41

## 2017-08-07 RX ADMIN — Medication PRN ML: at 03:55

## 2017-08-07 RX ADMIN — LORAZEPAM PRN MLS/MIN: 2 INJECTION INTRAMUSCULAR; INTRAVENOUS at 02:06

## 2017-08-07 RX ADMIN — GABAPENTIN SCH MG: 300 CAPSULE ORAL at 20:41

## 2017-08-07 RX ADMIN — INSULIN ASPART SCH UNITS: 100 INJECTION, SOLUTION INTRAVENOUS; SUBCUTANEOUS at 00:00

## 2017-08-07 RX ADMIN — METRONIDAZOLE SCH MLS/HR: 500 INJECTION, SOLUTION INTRAVENOUS at 11:07

## 2017-08-07 RX ADMIN — CIPROFLOXACIN SCH MLS/HR: 2 INJECTION, SOLUTION INTRAVENOUS at 08:40

## 2017-08-07 RX ADMIN — INSULIN ASPART SCH UNITS: 100 INJECTION, SOLUTION INTRAVENOUS; SUBCUTANEOUS at 17:19

## 2017-08-07 NOTE — HOSPITALIST PROGRESS NOTE
Hospitalist Progress Note


Date of Service


Aug 7, 2017.





Subjective


Pt evaluation today including:  conversation w/ patient, conversation w/ family

, physical exam, chart review, lab review, review of studies, review of 

inpatient medication list


Pain:  None


PO Intake:  NPO except Meds


Voiding:  vallejo catheter in place


Patient seen and evaluated. Mentation and tremors are much improved compared to 

yesterday.


He has minimal to no tremors today and is answering appropriately. Having a lot 

of gas today including flatus and burping.


Abdomen remains non-distended and as usual has no complaints. Surgical incision 

is well-approximated with staples in place; no surround signs of infection.


Did restart his po meds that do not come in IV format. Hopefully diet will be 

advanced today.


Discussed with the daughter about how weak he was yesterday and the need for 4 

nurses to move him from chair to bed.


   She continues to insist that he return home with [facility] and reports there will 

be 24/7 care by family including a granddaughter which daughter reports has 

special needs but is very helpful in the care of her grandfather.


   Did express concern for safety, not only for patient but family if he 

requires that much assistance. Daughter stated that "I think he will surprise 

you" as she states he is determined to return home and do what it takes PT/OT 

wise to make that happen. Will await new PT/OT evaluations as interventions 

have been limited due to medical status.





   Constitutional:  No fever, No chills


   Respiratory:  No shortness of breath





Medications





Current Inpatient Medications








 Medications


  (Trade)  Dose


 Ordered  Sig/Kalpesh


 Route  Start Time


 Stop Time Status Last Admin


Dose Admin


 


 Promethazine HCl


 12.5 mg/Sodium


 Chloride  50.5 ml @ 


 204 mls/hr  Q6H  PRN


 IV  7/27/17 16:00


 8/26/17 15:59  8/4/17 23:16


204 MLS/HR


 


 Ondansetron HCl


  (Zofran Inj)  4 mg  Q6H  PRN


 IV  7/27/17 16:00


 8/26/17 15:59  8/4/17 16:28


4 MG


 


 Ciprofloxacin/


 Dextrose 400 mg/


 Prmx  200 ml @ 


 100 mls/hr  Q12H


 IV  7/28/17 16:00


 8/7/17 20:59  8/7/17 08:40


100 MLS/HR


 


 Metronidazole 500


 mg/Prmx  100 ml @ 


 100 mls/hr  Q8H


 IV  7/28/17 18:00


 8/7/17 17:59  8/7/17 11:07


100 MLS/HR


 


 Albuterol/


 Ipratropium


  (Duoneb)  3 ml  Q2R  PRN


 INH  7/30/17 08:15


 8/29/17 08:14   


 


 


 Hydralazine HCl


  (HydrALAZINE INJ)  10 mg  Q6H  PRN


 IV.  7/31/17 08:30


 8/30/17 08:29  8/4/17 16:40


10 MG


 


 Naloxone HCl


  (Narcan Inj)  0.1 mg  Q5M  PRN


 IV  7/31/17 16:30


 8/30/17 16:29   


 


 


 Gabapentin


  (Neurontin Cap)  300 mg  QID


 PO  8/2/17 17:00


 9/1/17 16:59 Future hold 8/7/17 13:09


300 MG


 


 Ranitidine HCl


  (zANTac TAB)  150 mg  BID


 PO  8/2/17 21:00


 9/1/17 20:59 Future Hold 8/4/17 07:14


150 MG


 


 Metoprolol


 Tartrate


  (Lopressor Tab)  12.5 mg  BID


 PO  8/4/17 10:00


 9/3/17 09:59 Future Hold 8/4/17 09:07


12.5 MG


 


 Allopurinol


  (Zyloprim Tab)  300 mg  DAILY


 PO  8/4/17 10:00


 9/3/17 09:59 Future Hold 8/4/17 09:07


300 MG


 


 Aspirin


  (Ecotrin Tab)  81 mg  DAILY


 PO  8/4/17 10:00


 9/3/17 09:59 Future Hold 8/4/17 09:06


81 MG


 


 Triamterene/HCTZ


  (Maxzide 37.5/25


 Tab)  0.5 tab  DAILY


 PO  8/4/17 10:00


 9/3/17 09:59 Future Hold 8/4/17 09:06


0.5 TAB


 


 Cholecalciferol


  (Vitamin D Tab)  2,000


 inter.unit  DAILY


 PO  8/4/17 10:00


 9/3/17 09:59 Future Hold 8/4/17 09:07


2,000 INTER.UNIT


 


 Cyanocobalamin


  (Vitamin B-12


 Tab)  1,000 mcg  DAILY


 PO  8/4/17 10:00


 9/3/17 09:59 Future Hold 8/4/17 09:07


1,000 MCG


 


 Morphine Sulfate


  (MoRPHine


 SULFATE INJ)  2 mg  Q4H  PRN


 IV  8/4/17 17:00


 8/18/17 16:59   


 


 


 Miscellaneous


 Information


  (Pharmacy Tpn/


 Ppn Consult


 Active)  1 ea  UD  PRN


 N/A  8/4/17 17:15


 9/3/17 17:14   


 


 


 Enalaprilat 0.625


 mg/Dextrose  25.5 ml @ 


 100 mls/hr  Q8H  PRN


 IV  8/4/17 17:15


 9/3/17 17:14   


 


 


 Insulin Aspart


  (novoLOG ASPART)  SLIDING


 SCALE


 PARAMETER  Q6


 SC  8/5/17 06:00


 9/4/17 05:59   


 


 


 Lorazepam 0.5 mg/


 Syringe  0.5 ml @ 


 0.5 mls/min  Q6H  PRN


 IV  8/4/17 23:45


 9/3/17 23:44  8/7/17 02:06


0.5 MLS/MIN


 


 Dextrose  1,000 ml @ 


 0 mls/hr  Q0M PRN


 IV  8/5/17 16:00


 9/4/17 15:59   


 


 


 Heparin Sodium


  (Porcine)


  (Heparin 10 Unit/


 ml 5 ml Flush)  5 ml  PRN  PRN


 FLUSH  8/6/17 02:15


 9/5/17 02:14  8/7/17 06:00


10 ML


 


 Nutrition


  (Parenteral)  0 ml @ 0


 mls/hr  TODAY@1600


 IV  8/6/17 16:00


 8/7/17 15:59  8/6/17 16:28


0 MLS/HR


 


 Haloperidol


 Lactate


  (Haldol Inj)  5 mg  Q24H  PRN


 IM  8/6/17 13:30


 9/5/17 13:29   


 


 


 Clonazepam


  (Klonopin Tab)  0.5 mg  HS


 PO  8/6/17 21:00


 9/5/17 20:59   


 


 


 Carbidopa/Levodopa


  (Sinemet 25/


 100MG Tab)  1.5 tab  TID


 PO  8/6/17 14:00


 9/5/17 13:59  8/7/17 14:14


1.5 TAB


 


 Sertraline HCl


  (Zoloft Tab)  25 mg  QAM


 PO  8/7/17 09:00


 9/6/17 08:59  8/7/17 10:28


25 MG


 


 Nutrition


  (Parenteral)  0 ml @ 0


 mls/hr  TODAY@1600


 IV  8/7/17 16:00


 8/8/17 15:59   


 











Objective


Vital Signs











  Date Time  Temp Pulse Resp B/P (MAP) Pulse Ox O2 Delivery O2 Flow Rate FiO2


 


8/7/17 10:00    143/67 (92)    


 


8/7/17 07:55 36.5 63 16 161/71 (101) 95 Room Air  


 


8/7/17 07:50      Room Air  


 


8/6/17 23:44 37.2 60 18 144/60 (88) 96 Room Air  


 


8/6/17 19:20      Room Air  


 


8/6/17 16:00      Room Air  


 


8/6/17 15:34 37.0 79 18 128/82 (97) 95 Room Air  











Physical Exam


General Appearance:  no apparent distress


Eyes:  sclerae normal


ENT:  hearing grossly normal


Neck:  supple, no JVD, trachea midline


Respiratory/Chest:  lungs clear, normal breath sounds, no respiratory distress, 

no accessory muscle use


Cardiovascular:  regular rate, rhythm, no gallop, no murmur


Abdomen:  normal bowel sounds, non tender, soft, + pertinent finding (+flatus; +

burping; surgical incision well-approx. with staples without erythema, drainage

, tenderness)


Neurologic/Psychiatric:  alert


Skin:  normal color, warm/dry





Laboratory Results





Last 24 Hours








Test


  8/6/17


17:56 8/6/17


23:42 8/7/17


05:58 8/7/17


06:03


 


Bedside Glucose 124 mg/dl  106 mg/dl  112 mg/dl  


 


White Blood Count    10.69 K/uL 


 


Red Blood Count    3.39 M/uL 


 


Hemoglobin    10.2 g/dL 


 


Hematocrit    31.6 % 


 


Mean Corpuscular Volume    93.2 fL 


 


Mean Corpuscular Hemoglobin    30.1 pg 


 


Mean Corpuscular Hemoglobin


Concent 


  


  


  32.3 g/dl 


 


 


RDW Standard Deviation    47.4 fL 


 


RDW Coefficient of Variation    14.0 % 


 


Platelet Count    265 K/uL 


 


Mean Platelet Volume    9.6 fL 


 


Sodium Level    141 mmol/L 


 


Potassium Level    3.5 mmol/L 


 


Chloride Level    109 mmol/L 


 


Carbon Dioxide Level    26 mmol/L 


 


Anion Gap    6.0 mmol/L 


 


Blood Urea Nitrogen    16 mg/dl 


 


Creatinine    0.81 mg/dl 


 


Est Creatinine Clear Calc


Drug Dose 


  


  


  96.1 ml/min 


 


 


Estimated GFR (


American) 


  


  


  100.8 


 


 


Estimated GFR (Non-


American 


  


  


  86.9 


 


 


BUN/Creatinine Ratio    19.6 


 


Random Glucose    111 mg/dl 


 


Calcium Level    7.8 mg/dl 


 


Phosphorus Level    2.7 mg/dl 


 


Magnesium Level    2.2 mg/dl 


 


Test


  8/7/17


11:51 


  


  


 


 


Bedside Glucose 109 mg/dl    











Assessment and Plan


This is a 77 yo M with PMHx of T2DM with Diabetic Neuropathy, HTN, Gout, GIULIA on 

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 

anxiety disorder who presents with abdominal pain and bloating since Tuesday. 





Sigmoid Volvulus S/P Decompression 7/28 and 7/29 and S/P Sigmoid Colectomy 7/31


- Resolved post-operative ileus - NGT removed and abdomen remains non-distended


- PICC line placed with TPN supplied - plan to continue until adequate oral 

intake achieved


- Cipro 400 mg IV BID and Flagyl 500 mg IV Q8H on 7/28 to finish a 10 day 

course - today will be last day of dosing


- Ranitidine 50 mg IV Q8H


- General Surgery management appreciated - plan for SLP evaluation and diet 

advancement


- SLP - recommending full liquid diet and to advance as tolerated





Encephalopathy - Delirium vs Medication Withdrawal: RESOLVED


- Suspect withdrawal symptoms as unable to give Sinemet, Zoloft, and Clonazepam 

with NPO


- Reinstituted Sinemet, Zoloft and Clonazepam


- Haldol x 1 dose given on 8/6 and no further dosing necessary at this point





Low Urine Output: RESOLVED


- Lasix x 2 days with diuresis but improved with reinstitution of fluids - now 

currently D/Cd and will D/C [place]


- Follow UOP and straight cath is PVR >350 mL





T2DM with Diabetic Neuropathy: A1c 5.9 (2/22/17)


- Pt was recently taken off metformin by his PCP - continue to monitor and 

coverage if necessary - no coverage required so far





Hypokalemia and Hypophosphatemia: RESOLVED


- Continue to monitor and replete as necessary





Parkinson's Disease/Restless Leg Syndrome: IMPROVING


- Gabapentin 300 mg QID and Carbidopa/Levodopa 25/100 mg 1.5 tabs TID





HTN: 


- Oral medications with the additional Metoprolol on hold due to NPO - use 

Hydralazine and Vasotec PRN





Gout:


- Allopurinol 300 mg daily - will hold at this time





GIULIA:


- CPAP at night


- Clonazepam and Sertraline 25 mg daily


- IV Ativan PRN - hopeful to not need now that on oral meds





DVT Prophylaxis: BERENICE/SCDs; withhold chemical prophylaxis for surgical 

intervention





Code Status: FULL RESUSCITATION





Disposition: SNF vs Rehab - uncertain on D/C date


- Discussed with daughter again today about acute rehab/SNF - still expresses 

desire to return home - will await new PT/OT visits and will monitor progress


   -- Anticipate continued stay possibly 3-4 days pending oral intake


Continued [facility] stay due to:  multiple IV medications needed


Discharge planning:  uncertain

## 2017-08-07 NOTE — SURGERY PROGRESS NOTE
Surgery Progress Note


Date of Service


Aug 7, 2017.





Subjective


pt was given some ativan last night and since has had some confusion per 

nursing. currently he is resting comfortably. denies pain.





Objective


Vital Signs:











  Date Time  Temp Pulse Resp B/P (MAP) Pulse Ox O2 Delivery O2 Flow Rate FiO2


 


8/7/17 07:55 36.5 63 16 161/71 (101) 95 Room Air  


 


8/6/17 23:44 37.2 60 18 144/60 (88) 96 Room Air  


 


8/6/17 19:20      Room Air  


 


8/6/17 16:00      Room Air  


 


8/6/17 15:34 37.0 79 18 128/82 (97) 95 Room Air  


 


8/6/17 11:47 36.9 78 16 150/72 (98) 93  2.0 








General Appearance:  no apparent distress


Respiratory/Chest:  no respiratory distress, no accessory muscle use


Abdomen:  non tender, soft, + pertinent finding (still slightly distended but 

much improved. )


Incision(s):  clean, dry, intact, no erythema


Laboratory Results:





Results Past 24 Hours








Test


  8/6/17


11:53 8/6/17


17:56 8/6/17


23:42 8/7/17


05:58 Range/Units


 


 


Bedside Glucose 106 124 106 112 70-99  mg/dl


 


Test


  8/7/17


06:03 


  


  


  Range/Units


 


 


White Blood Count 10.69    4.8-10.8  K/uL


 


Red Blood Count 3.39    4.7-6.1  M/uL


 


Hemoglobin 10.2    14.0-18.0  g/dL


 


Hematocrit 31.6    42-52  %


 


Mean Corpuscular Volume 93.2      fL


 


Mean Corpuscular Hemoglobin 30.1    25-34  pg


 


Mean Corpuscular Hemoglobin


Concent 32.3


  


  


  


  32-36  g/dl


 


 


RDW Standard Deviation 47.4    36.4-46.3  fL


 


RDW Coefficient of Variation 14.0    11.5-14.5  %


 


Platelet Count 265    130-400  K/uL


 


Mean Platelet Volume 9.6    7.4-10.4  fL


 


Sodium Level 141    136-145  mmol/L


 


Potassium Level 3.5    3.5-5.1  mmol/L


 


Chloride Level 109      mmol/L


 


Carbon Dioxide Level 26    21-32  mmol/L


 


Anion Gap 6.0    3-11  mmol/L


 


Blood Urea Nitrogen 16    7-18  mg/dl


 


Creatinine


  0.81


  


  


  


  0.60-1.40


mg/dl


 


Est Creatinine Clear Calc


Drug Dose 96.1


  


  


  


   ml/min


 


 


Estimated GFR (


American) 100.8


  


  


  


   


 


 


Estimated GFR (Non-


American 86.9


  


  


  


   


 


 


BUN/Creatinine Ratio 19.6    10-20  


 


Random Glucose 111    70-99  mg/dl


 


Calcium Level 7.8    8.5-10.1  mg/dl


 


Phosphorus Level 2.7    2.5-4.9  mg/dl


 


Magnesium Level 2.2    1.8-2.4  mg/dl











Assessment & Plan


8/7/17


no surgical issue currently


awaiting speech path to evaluate for diet which we can then institute


continue tpn until PO intake adequate








8/6/17


will obtain ngt clamp trial, if ok will pull ngt and obtain bedside swallow test

/start liquids


cont TPN for now


H/H stable


PT/OT


wound looks good. CEDRICK pulled today. 








8/5/17


post op ileus. improved clinically since placing ngt. keep for now. will 

consider clamp trial tomorrow


awaiting bowel fx


TPN starting tonight which I agree with


pain control adequate











8/4/17


doing ok but we need to get him moving


d/c pca herber basal rate


PT/OT


? can we d/c [place]


awaiting bowel fx








8/3/27


doing well clinically


awaiting bowel fx


Cedrick serous


may increase to full liquids


follow wbc








8/1/17


sigmoid volvulus


   partially decompressed after endoscopy x2 and rectal tube placement


   high likelihood of recurrence once tube is removed


   will proceed with sigmoid colectomy this afternoon





as above. discussed with pt and his daughter. discussed options/risks. 

discussed high likelihood of recurrence.  discussed surgical risks ( bleeding/

infection/dvt/pe/mi/leaks/injury to other organs such as ureter/bladder/small 

bowel etc...).  answered questions. will proceed with open sigmoidectomy this 

afternoon.


8/6/17


will obtain ngt clamp trial, if ok will pull ngt and obtain bedside swallow test

/start liquids


cont TPN for now


H/H stable


PT/OT


wound looks good. CEDRICK pulled today. 








8/5/17


post op ileus. improved clinically since placing ngt. keep for now. will 

consider clamp trial tomorrow


awaiting bowel fx


TPN starting tonight which I agree with


pain control adequate











8/4/17


doing ok but we need to get him moving


d/c pca herber basal rate


PT/OT


? can we d/c [place]


awaiting bowel fx








8/3/27


doing well clinically


awaiting bowel fx


Cedrick serous


may increase to full liquids


follow wbc








8/1/17


sigmoid volvulus


   partially decompressed after endoscopy x2 and rectal tube placement


   high likelihood of recurrence once tube is removed


   will proceed with sigmoid colectomy this afternoon





as above. discussed with pt and his daughter. discussed options/risks. 

discussed high likelihood of recurrence.  discussed surgical risks ( bleeding/

infection/dvt/pe/mi/leaks/injury to other organs such as ureter/bladder/small 

bowel etc...).  answered questions. will proceed with open sigmoidectomy this 

afternoon.

## 2017-08-08 VITALS
DIASTOLIC BLOOD PRESSURE: 65 MMHG | OXYGEN SATURATION: 95 % | HEART RATE: 104 BPM | SYSTOLIC BLOOD PRESSURE: 124 MMHG | TEMPERATURE: 98.24 F

## 2017-08-08 VITALS
SYSTOLIC BLOOD PRESSURE: 128 MMHG | TEMPERATURE: 98.78 F | DIASTOLIC BLOOD PRESSURE: 75 MMHG | OXYGEN SATURATION: 94 % | HEART RATE: 62 BPM

## 2017-08-08 VITALS
SYSTOLIC BLOOD PRESSURE: 162 MMHG | TEMPERATURE: 98.42 F | OXYGEN SATURATION: 96 % | DIASTOLIC BLOOD PRESSURE: 82 MMHG | HEART RATE: 81 BPM

## 2017-08-08 VITALS — OXYGEN SATURATION: 96 %

## 2017-08-08 LAB
ANION GAP SERPL CALC-SCNC: 5 MMOL/L (ref 3–11)
BUN SERPL-MCNC: 16 MG/DL (ref 7–18)
BUN/CREAT SERPL: 17.6 (ref 10–20)
CALCIUM SERPL-MCNC: 8.2 MG/DL (ref 8.5–10.1)
CHLORIDE SERPL-SCNC: 107 MMOL/L (ref 98–107)
CO2 SERPL-SCNC: 28 MMOL/L (ref 21–32)
CREAT CL PREDICTED SERPL C-G-VRATE: 77.8 ML/MIN
CREAT SERPL-MCNC: 0.9 MG/DL (ref 0.6–1.4)
EOSINOPHIL NFR BLD AUTO: 295 K/UL (ref 130–400)
GLUCOSE SERPL-MCNC: 115 MG/DL (ref 70–99)
HCT VFR BLD CALC: 32.7 % (ref 42–52)
MAGNESIUM SERPL-MCNC: 2.1 MG/DL (ref 1.8–2.4)
MCH RBC QN AUTO: 30.7 PG (ref 25–34)
MCHC RBC AUTO-ENTMCNC: 32.7 G/DL (ref 32–36)
MCV RBC AUTO: 93.7 FL (ref 80–100)
PHOSPHATE SERPL-MCNC: 2.5 MG/DL (ref 2.5–4.9)
PMV BLD AUTO: 10 FL (ref 7.4–10.4)
POTASSIUM SERPL-SCNC: 3.6 MMOL/L (ref 3.5–5.1)
RBC # BLD AUTO: 3.49 M/UL (ref 4.7–6.1)
SODIUM SERPL-SCNC: 140 MMOL/L (ref 136–145)
WBC # BLD AUTO: 11.39 K/UL (ref 4.8–10.8)

## 2017-08-08 RX ADMIN — GABAPENTIN SCH MG: 300 CAPSULE ORAL at 13:28

## 2017-08-08 RX ADMIN — GABAPENTIN SCH MG: 300 CAPSULE ORAL at 08:16

## 2017-08-08 RX ADMIN — SERTRALINE HYDROCHLORIDE SCH MG: 50 TABLET, FILM COATED ORAL at 08:17

## 2017-08-08 RX ADMIN — INSULIN ASPART SCH UNITS: 100 INJECTION, SOLUTION INTRAVENOUS; SUBCUTANEOUS at 21:00

## 2017-08-08 RX ADMIN — INSULIN ASPART SCH UNITS: 100 INJECTION, SOLUTION INTRAVENOUS; SUBCUTANEOUS at 12:00

## 2017-08-08 RX ADMIN — GABAPENTIN SCH MG: 300 CAPSULE ORAL at 21:39

## 2017-08-08 RX ADMIN — Medication PRN ML: at 05:48

## 2017-08-08 RX ADMIN — GABAPENTIN SCH MG: 300 CAPSULE ORAL at 18:31

## 2017-08-08 RX ADMIN — CARBIDOPA AND LEVODOPA SCH TAB: 25; 100 TABLET ORAL at 21:39

## 2017-08-08 RX ADMIN — CARBIDOPA AND LEVODOPA SCH TAB: 25; 100 TABLET ORAL at 13:28

## 2017-08-08 RX ADMIN — CARBIDOPA AND LEVODOPA SCH TAB: 25; 100 TABLET ORAL at 08:15

## 2017-08-08 RX ADMIN — INSULIN ASPART SCH UNITS: 100 INJECTION, SOLUTION INTRAVENOUS; SUBCUTANEOUS at 17:15

## 2017-08-08 RX ADMIN — INSULIN ASPART SCH UNITS: 100 INJECTION, SOLUTION INTRAVENOUS; SUBCUTANEOUS at 09:53

## 2017-08-08 RX ADMIN — CLONAZEPAM SCH MG: 0.5 TABLET ORAL at 21:39

## 2017-08-08 NOTE — PROGRESS NOTE
Subjective


Date of Service:


Aug 8, 2017.


Subjective


Pt evaluation today including:  conversation w/ patient, physical exam, lab 

review, review of inpatient medication list


Pain:  no pain


PO Intake:  tolerating full liquids


Voiding:  no voiding problems


patient doing well, sitting in chair


voiding well after vallejo pulled


increased ambulation, walking in halls now, dramatic improvement with Sinemet


tolerating diet





reviewed lab work, stable





Problem List


Medical Problems:


(1) Rhabdomyolysis


Status: Acute  





(2) Sigmoid volvulus


Status: Acute  





(3) Weakness


Status: Acute  











Review of Systems


All Other Systems:  Reviewed and Negative





Medications





Current Inpatient Medications








 Medications


  (Trade)  Dose


 Ordered  Sig/Kalpesh


 Route  Start Time


 Stop Time Status Last Admin


Dose Admin


 


 Promethazine HCl


 12.5 mg/Sodium


 Chloride  50.5 ml @ 


 204 mls/hr  Q6H  PRN


 IV  7/27/17 16:00


 8/26/17 15:59  8/4/17 23:16


204 MLS/HR


 


 Ondansetron HCl


  (Zofran Inj)  4 mg  Q6H  PRN


 IV  7/27/17 16:00


 8/26/17 15:59  8/4/17 16:28


4 MG


 


 Albuterol/


 Ipratropium


  (Duoneb)  3 ml  Q2R  PRN


 INH  7/30/17 08:15


 8/29/17 08:14   


 


 


 Hydralazine HCl


  (HydrALAZINE INJ)  10 mg  Q6H  PRN


 IV.  7/31/17 08:30


 8/30/17 08:29  8/4/17 16:40


10 MG


 


 Naloxone HCl


  (Narcan Inj)  0.1 mg  Q5M  PRN


 IV  7/31/17 16:30


 8/30/17 16:29   


 


 


 Gabapentin


  (Neurontin Cap)  300 mg  QID


 PO  8/2/17 17:00


 9/1/17 16:59 Future hold 8/8/17 13:28


300 MG


 


 Ranitidine HCl


  (zANTac TAB)  150 mg  BID


 PO  8/2/17 21:00


 9/1/17 20:59 Future Hold 8/4/17 07:14


150 MG


 


 Metoprolol


 Tartrate


  (Lopressor Tab)  12.5 mg  BID


 PO  8/4/17 10:00


 9/3/17 09:59 Future Hold 8/4/17 09:07


12.5 MG


 


 Allopurinol


  (Zyloprim Tab)  300 mg  DAILY


 PO  8/4/17 10:00


 9/3/17 09:59 Future Hold 8/4/17 09:07


300 MG


 


 Aspirin


  (Ecotrin Tab)  81 mg  DAILY


 PO  8/4/17 10:00


 9/3/17 09:59 Future Hold 8/4/17 09:06


81 MG


 


 Triamterene/HCTZ


  (Maxzide 37.5/25


 Tab)  0.5 tab  DAILY


 PO  8/4/17 10:00


 9/3/17 09:59 Future Hold 8/4/17 09:06


0.5 TAB


 


 Cholecalciferol


  (Vitamin D Tab)  2,000


 inter.unit  DAILY


 PO  8/4/17 10:00


 9/3/17 09:59 Future Hold 8/4/17 09:07


2,000 INTER.UNIT


 


 Cyanocobalamin


  (Vitamin B-12


 Tab)  1,000 mcg  DAILY


 PO  8/4/17 10:00


 9/3/17 09:59 Future Hold 8/4/17 09:07


1,000 MCG


 


 Morphine Sulfate


  (MoRPHine


 SULFATE INJ)  2 mg  Q4H  PRN


 IV  8/4/17 17:00


 8/18/17 16:59   


 


 


 Miscellaneous


 Information


  (Pharmacy Tpn/


 Ppn Consult


 Active)  1 ea  UD  PRN


 N/A  8/4/17 17:15


 8/8/17 15:58   


 


 


 Enalaprilat 0.625


 mg/Dextrose  25.5 ml @ 


 100 mls/hr  Q8H  PRN


 IV  8/4/17 17:15


 9/3/17 17:14   


 


 


 Lorazepam 0.5 mg/


 Syringe  0.5 ml @ 


 0.5 mls/min  Q6H  PRN


 IV  8/4/17 23:45


 9/3/17 23:44  8/7/17 02:06


0.5 MLS/MIN


 


 Dextrose  1,000 ml @ 


 0 mls/hr  Q0M PRN


 IV  8/5/17 16:00


 9/4/17 15:59   


 


 


 Heparin Sodium


  (Porcine)


  (Heparin 10 Unit/


 ml 5 ml Flush)  5 ml  PRN  PRN


 FLUSH  8/6/17 02:15


 9/5/17 02:14  8/8/17 05:48


5 ML


 


 Haloperidol


 Lactate


  (Haldol Inj)  5 mg  Q24H  PRN


 IM  8/6/17 13:30


 9/5/17 13:29   


 


 


 Clonazepam


  (Klonopin Tab)  0.5 mg  HS


 PO  8/6/17 21:00


 9/5/17 20:59  8/7/17 20:52


0.5 MG


 


 Carbidopa/Levodopa


  (Sinemet 25/


 100MG Tab)  1.5 tab  TID


 PO  8/6/17 14:00


 9/5/17 13:59  8/8/17 13:28


1.5 TAB


 


 Sertraline HCl


  (Zoloft Tab)  25 mg  QAM


 PO  8/7/17 09:00


 9/6/17 08:59  8/8/17 08:17


25 MG


 


 Nutrition


  (Parenteral)  0 ml @ 0


 mls/hr  TODAY@1600


 IV  8/7/17 16:00


 8/8/17 15:59  8/7/17 16:54


0 MLS/HR


 


 Insulin Aspart


  (novoLOG ASPART)  SLIDING


 SCALE


 PARAMETER  ACHS


 SC  8/7/17 17:19


 9/4/17 05:59   


 











Objective


Vital Signs











  Date Time  Temp Pulse Resp B/P (MAP) Pulse Ox O2 Delivery O2 Flow Rate FiO2


 


8/8/17 09:30     96 Room Air  


 


8/8/17 08:15      Room Air  


 


8/8/17 07:51 36.9 81 19 162/82 (108) 96 Room Air  


 


8/8/17 00:15      Room Air  





      CPAP  


 


8/7/17 23:02 36.7 69 16 152/79 (103) 94 CPAP  


 


8/7/17 16:50      Room Air  


 


8/7/17 15:30 36.8 68 16 143/72 (95) 95 Room Air  











Physical Exam


General Appearance:  WD/WN, no apparent distress


ENT:  normal ENT inspection, hearing grossly normal, pharynx normal


Neck:  supple, no adenopathy, no JVD, trachea midline


Respiratory/Chest:  chest non-tender, lungs clear, normal breath sounds, no 

respiratory distress, no accessory muscle use


Cardiovascular:  regular rate, rhythm, no edema, no gallop, no JVD, no murmur


Abdomen:  non tender, soft, no organomegaly, + abnormal bowel sounds (hypoactive

), + pertinent finding (incision clean and dry)


Extremities:  normal range of motion, non-tender, normal inspection, no pedal 

edema, no calf tenderness, pelvis stable


Neurologic/Psychiatric:  CNs II-XII nml as tested, alert, normal mood/affect, 

oriented x 3, + pertinent finding (mild resting tremors)


Skin:  normal color, warm/dry, no rash





Laboratory Results





Last 24 Hours








Test


  8/7/17


17:14 8/7/17


20:59 8/8/17


05:51 8/8/17


08:04


 


Bedside Glucose 101 mg/dl  121 mg/dl   136 mg/dl 


 


White Blood Count   11.39 K/uL  


 


Red Blood Count   3.49 M/uL  


 


Hemoglobin   10.7 g/dL  


 


Hematocrit   32.7 %  


 


Mean Corpuscular Volume   93.7 fL  


 


Mean Corpuscular Hemoglobin   30.7 pg  


 


Mean Corpuscular Hemoglobin


Concent 


  


  32.7 g/dl 


  


 


 


RDW Standard Deviation   48.1 fL  


 


RDW Coefficient of Variation   14.1 %  


 


Platelet Count   295 K/uL  


 


Mean Platelet Volume   10.0 fL  


 


Sodium Level   140 mmol/L  


 


Potassium Level   3.6 mmol/L  


 


Chloride Level   107 mmol/L  


 


Carbon Dioxide Level   28 mmol/L  


 


Anion Gap   5.0 mmol/L  


 


Blood Urea Nitrogen   16 mg/dl  


 


Creatinine   0.90 mg/dl  


 


Est Creatinine Clear Calc


Drug Dose 


  


  77.8 ml/min 


  


 


 


Estimated GFR (


American) 


  


  96.5 


  


 


 


Estimated GFR (Non-


American 


  


  83.3 


  


 


 


BUN/Creatinine Ratio   17.6  


 


Random Glucose   115 mg/dl  


 


Calcium Level   8.2 mg/dl  


 


Phosphorus Level   2.5 mg/dl  


 


Magnesium Level   2.1 mg/dl  


 


Test


  8/8/17


12:01 


  


  


 


 


Bedside Glucose 114 mg/dl    











Assessment and Plan


This is a 75 yo M with PMHx of T2DM with Diabetic Neuropathy, HTN, Gout, GIULIA on 

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 

anxiety disorder who presents with abdominal pain and bloating since Tuesday. 





Sigmoid Volvulus S/P Decompression 7/28 and 7/29 and S/P Sigmoid Colectomy 7/31


- post op ileus is resolved, liquid BM, no vomiting, tolerating full liquid diet

, he has an appetite


- PICC line placed with TPN supplied - plan to continue until adequate oral 

intake achieved, hopefully tomorrow will be last day


- Cipro 400 mg IV BID and Flagyl 500 mg IV Q8H on 7/28 to finish a 10 day 

course - completed


- Ranitidine 50 mg IV Q8H


- discharge when okay with general surgery





Encephalopathy - Delirium vs Medication Withdrawal: RESOLVED


- Suspect withdrawal symptoms as unable to give Sinemet, Zoloft, and Clonazepam 

with NPO


- Reinstituted Sinemet, Zoloft and Clonazepam on 8/6


- mental status clear today, joking around, oriented x 3





Low Urine Output: RESOLVED


- vallejo pulled on 8/7, urinating well, 250-300cc at a time





T2DM with Diabetic Neuropathy: A1c 5.9 (2/22/17)


- Pt was recently taken off metformin by his PCP - continue to monitor and 

coverage if necessary - no coverage required so far





Hypokalemia and Hypophosphatemia: RESOLVED


- Continue to monitor and replete as necessary





Parkinson's Disease/Restless Leg Syndrome: IMPROVING


- Gabapentin 300 mg QID and Carbidopa/Levodopa 25/100 mg 1.5 tabs TID





HTN: 


- Oral medications with the additional Metoprolol on hold due to NPO - use 

Hydralazine and Vasotec PRN





Gout:


- Allopurinol 300 mg daily - will hold at this time





GIULIA:


- CPAP at night


- Clonazepam and Sertraline 25 mg daily


- IV Ativan PRN - hopeful to not need now that on oral meds





DVT Prophylaxis: BERENICE/SCDs; withhold chemical prophylaxis for surgical 

intervention





Code Status: FULL RESUSCITATION





Disposition: plan to go home with aide of family and possibly home health


   anticipate in the next 2-3 days


Continued [facility] stay due to:  multiple IV medications needed


Discharge planning:  uncertain

## 2017-08-08 NOTE — SURGERY PROGRESS NOTE
Surgery Progress Note


Date of Service


Aug 8, 2017.





Subjective


more alert today, amb halls yesterday, tolerating full liquids





Objective


Vital Signs:











  Date Time  Temp Pulse Resp B/P (MAP) Pulse Ox O2 Delivery O2 Flow Rate FiO2


 


8/8/17 07:51 36.9 81 19 162/82 (108) 96 Room Air  


 


8/8/17 00:15      Room Air  





      CPAP  


 


8/7/17 23:02 36.7 69 16 152/79 (103) 94 CPAP  


 


8/7/17 16:50      Room Air  


 


8/7/17 15:30 36.8 68 16 143/72 (95) 95 Room Air  


 


8/7/17 10:00    143/67 (92)    








Abdomen:  non tender, soft, + distended


Incision(s):  clean, dry


Laboratory Results:





Results Past 24 Hours








Test


  8/7/17


11:51 8/7/17


17:14 8/7/17


20:59 8/8/17


05:51 Range/Units


 


 


Bedside Glucose 109 101 121  70-99  mg/dl


 


White Blood Count    11.39 4.8-10.8  K/uL


 


Red Blood Count    3.49 4.7-6.1  M/uL


 


Hemoglobin    10.7 14.0-18.0  g/dL


 


Hematocrit    32.7 42-52  %


 


Mean Corpuscular Volume    93.7   fL


 


Mean Corpuscular Hemoglobin    30.7 25-34  pg


 


Mean Corpuscular Hemoglobin


Concent 


  


  


  32.7


  32-36  g/dl


 


 


RDW Standard Deviation    48.1 36.4-46.3  fL


 


RDW Coefficient of Variation    14.1 11.5-14.5  %


 


Platelet Count    295 130-400  K/uL


 


Mean Platelet Volume    10.0 7.4-10.4  fL


 


Sodium Level    140 136-145  mmol/L


 


Potassium Level    3.6 3.5-5.1  mmol/L


 


Chloride Level    107   mmol/L


 


Carbon Dioxide Level    28 21-32  mmol/L


 


Anion Gap    5.0 3-11  mmol/L


 


Blood Urea Nitrogen    16 7-18  mg/dl


 


Creatinine


  


  


  


  0.90


  0.60-1.40


mg/dl


 


Est Creatinine Clear Calc


Drug Dose 


  


  


  77.8


   ml/min


 


 


Estimated GFR (


American) 


  


  


  96.5


   


 


 


Estimated GFR (Non-


American 


  


  


  83.3


   


 


 


BUN/Creatinine Ratio    17.6 10-20  


 


Random Glucose    115 70-99  mg/dl


 


Calcium Level    8.2 8.5-10.1  mg/dl


 


Phosphorus Level    2.5 2.5-4.9  mg/dl


 


Magnesium Level    2.1 1.8-2.4  mg/dl


 


Test


  8/8/17


08:04 


  


  


  Range/Units


 


 


Bedside Glucose 136    70-99  mg/dl











Assessment & Plan


s/p sigmoid resection for volvulus


post op ileus, resolved


   much improved in past 24 hrs


   advance diet, would continue TPN one more day

## 2017-08-09 VITALS
SYSTOLIC BLOOD PRESSURE: 112 MMHG | TEMPERATURE: 98.78 F | HEART RATE: 68 BPM | DIASTOLIC BLOOD PRESSURE: 63 MMHG | OXYGEN SATURATION: 95 %

## 2017-08-09 VITALS
TEMPERATURE: 97.7 F | HEART RATE: 61 BPM | DIASTOLIC BLOOD PRESSURE: 72 MMHG | SYSTOLIC BLOOD PRESSURE: 153 MMHG | OXYGEN SATURATION: 95 %

## 2017-08-09 VITALS
HEART RATE: 63 BPM | SYSTOLIC BLOOD PRESSURE: 127 MMHG | TEMPERATURE: 97.52 F | DIASTOLIC BLOOD PRESSURE: 65 MMHG | OXYGEN SATURATION: 91 %

## 2017-08-09 RX ADMIN — GABAPENTIN SCH MG: 300 CAPSULE ORAL at 09:00

## 2017-08-09 RX ADMIN — SERTRALINE HYDROCHLORIDE SCH MG: 50 TABLET, FILM COATED ORAL at 09:00

## 2017-08-09 RX ADMIN — INSULIN ASPART SCH UNITS: 100 INJECTION, SOLUTION INTRAVENOUS; SUBCUTANEOUS at 12:00

## 2017-08-09 RX ADMIN — INSULIN ASPART SCH UNITS: 100 INJECTION, SOLUTION INTRAVENOUS; SUBCUTANEOUS at 08:00

## 2017-08-09 RX ADMIN — LORAZEPAM PRN MLS/MIN: 2 INJECTION INTRAMUSCULAR; INTRAVENOUS at 21:42

## 2017-08-09 RX ADMIN — GABAPENTIN SCH MG: 300 CAPSULE ORAL at 21:22

## 2017-08-09 RX ADMIN — RANITIDINE SCH MG: 150 TABLET ORAL at 21:22

## 2017-08-09 RX ADMIN — Medication PRN ML: at 13:28

## 2017-08-09 RX ADMIN — INSULIN ASPART SCH UNITS: 100 INJECTION, SOLUTION INTRAVENOUS; SUBCUTANEOUS at 18:11

## 2017-08-09 RX ADMIN — INSULIN ASPART SCH UNITS: 100 INJECTION, SOLUTION INTRAVENOUS; SUBCUTANEOUS at 21:00

## 2017-08-09 RX ADMIN — Medication PRN ML: at 21:42

## 2017-08-09 RX ADMIN — GABAPENTIN SCH MG: 300 CAPSULE ORAL at 13:25

## 2017-08-09 RX ADMIN — CARBIDOPA AND LEVODOPA SCH TAB: 25; 100 TABLET ORAL at 21:22

## 2017-08-09 RX ADMIN — CARBIDOPA AND LEVODOPA SCH TAB: 25; 100 TABLET ORAL at 13:25

## 2017-08-09 RX ADMIN — GABAPENTIN SCH MG: 300 CAPSULE ORAL at 18:12

## 2017-08-09 RX ADMIN — CARBIDOPA AND LEVODOPA SCH TAB: 25; 100 TABLET ORAL at 09:00

## 2017-08-09 RX ADMIN — CLONAZEPAM SCH MG: 0.5 TABLET ORAL at 21:22

## 2017-08-09 NOTE — SURGERY PROGRESS NOTE
Surgery Progress Note


Date of Service


Aug 9, 2017.





Subjective


+ feeling well, + bowel movement (yesterday), + diet (regular), No nausea





Objective


Vital Signs:











  Date Time  Temp Pulse Resp B/P (MAP) Pulse Ox O2 Delivery O2 Flow Rate FiO2


 


8/9/17 07:18 36.4 63 17 127/65 (85) 91 Room Air  


 


8/9/17 07:15      Room Air  


 


8/8/17 23:15      Room Air  


 


8/8/17 22:45 37.1 62 16 128/75 (92) 94 BiPAP  


 


8/8/17 15:30      Room Air  


 


8/8/17 15:00 36.8 104 18 124/65 (84) 95 Room Air  


 


8/8/17 09:30     96 Room Air  


 


8/8/17 08:15      Room Air  








Abdomen:  non distended, soft


Laboratory Results:





Results Past 24 Hours








Test


  8/8/17


12:01 8/8/17


16:47 8/8/17


20:25 Range/Units


 


 


Bedside Glucose 114 88 107 70-99  mg/dl











Assessment & Plan


s/p sigmoid resection for volvulus


post op ileus, resolved


   tolerating diet, off TPN


   d/c per primary, follow-up in 7-10 days


   seen with Dr. Donato

## 2017-08-09 NOTE — HOSPITALIST PROGRESS NOTE
Hospitalist Progress Note


Date of Service


Aug 9, 2017.





Subjective


Pt evaluation today including:  conversation w/ patient, physical exam, chart 

review, lab review, review of studies, review of inpatient medication list


Patient seen and evaluated. Verbalized no complaints other than being tired 

from lack of sleep.


He is tolerating a diet and moving his bowels. He is not having abdominal 

bloating. 


He has improved with ambulation and no noted tremor or agitation/delirium today.





   Constitutional:  No fever, No chills


   Respiratory:  No shortness of breath


   Cardiovascular:  No chest pain


   Abdomen:  No pain, No nausea, No vomiting


   Male :  No dysuria





Medications





Current Inpatient Medications








 Medications


  (Trade)  Dose


 Ordered  Sig/Kalpesh


 Route  Start Time


 Stop Time Status Last Admin


Dose Admin


 


 Promethazine HCl


 12.5 mg/Sodium


 Chloride  50.5 ml @ 


 204 mls/hr  Q6H  PRN


 IV  7/27/17 16:00


 8/26/17 15:59  8/4/17 23:16


204 MLS/HR


 


 Ondansetron HCl


  (Zofran Inj)  4 mg  Q6H  PRN


 IV  7/27/17 16:00


 8/26/17 15:59  8/4/17 16:28


4 MG


 


 Albuterol/


 Ipratropium


  (Duoneb)  3 ml  Q2R  PRN


 INH  7/30/17 08:15


 8/29/17 08:14   


 


 


 Hydralazine HCl


  (HydrALAZINE INJ)  10 mg  Q6H  PRN


 IV.  7/31/17 08:30


 8/30/17 08:29  8/4/17 16:40


10 MG


 


 Naloxone HCl


  (Narcan Inj)  0.1 mg  Q5M  PRN


 IV  7/31/17 16:30


 8/30/17 16:29   


 


 


 Gabapentin


  (Neurontin Cap)  300 mg  QID


 PO  8/2/17 17:00


 9/1/17 16:59 Future hold 8/9/17 13:25


300 MG


 


 Ranitidine HCl


  (zANTac TAB)  150 mg  BID


 PO  8/2/17 21:00


 9/1/17 20:59 Future Hold 8/4/17 07:14


150 MG


 


 Metoprolol


 Tartrate


  (Lopressor Tab)  12.5 mg  BID


 PO  8/4/17 10:00


 9/3/17 09:59 Future Hold 8/4/17 09:07


12.5 MG


 


 Allopurinol


  (Zyloprim Tab)  300 mg  DAILY


 PO  8/4/17 10:00


 9/3/17 09:59 Future Hold 8/4/17 09:07


300 MG


 


 Aspirin


  (Ecotrin Tab)  81 mg  DAILY


 PO  8/4/17 10:00


 9/3/17 09:59 Future Hold 8/4/17 09:06


81 MG


 


 Triamterene/HCTZ


  (Maxzide 37.5/25


 Tab)  0.5 tab  DAILY


 PO  8/4/17 10:00


 9/3/17 09:59 Future Hold 8/4/17 09:06


0.5 TAB


 


 Cholecalciferol


  (Vitamin D Tab)  2,000


 inter.unit  DAILY


 PO  8/4/17 10:00


 9/3/17 09:59 Future Hold 8/4/17 09:07


2,000 INTER.UNIT


 


 Cyanocobalamin


  (Vitamin B-12


 Tab)  1,000 mcg  DAILY


 PO  8/4/17 10:00


 9/3/17 09:59 Future Hold 8/4/17 09:07


1,000 MCG


 


 Morphine Sulfate


  (MoRPHine


 SULFATE INJ)  2 mg  Q4H  PRN


 IV  8/4/17 17:00


 8/18/17 16:59   


 


 


 Enalaprilat 0.625


 mg/Dextrose  25.5 ml @ 


 100 mls/hr  Q8H  PRN


 IV  8/4/17 17:15


 9/3/17 17:14   


 


 


 Lorazepam 0.5 mg/


 Syringe  0.5 ml @ 


 0.5 mls/min  Q6H  PRN


 IV  8/4/17 23:45


 9/3/17 23:44  8/7/17 02:06


0.5 MLS/MIN


 


 Heparin Sodium


  (Porcine)


  (Heparin 10 Unit/


 ml 5 ml Flush)  5 ml  PRN  PRN


 FLUSH  8/6/17 02:15


 9/5/17 02:14  8/9/17 13:28


15 ML


 


 Haloperidol


 Lactate


  (Haldol Inj)  5 mg  Q24H  PRN


 IM  8/6/17 13:30


 9/5/17 13:29   


 


 


 Clonazepam


  (Klonopin Tab)  0.5 mg  HS


 PO  8/6/17 21:00


 9/5/17 20:59  8/8/17 21:39


0.5 MG


 


 Carbidopa/Levodopa


  (Sinemet 25/


 100MG Tab)  1.5 tab  TID


 PO  8/6/17 14:00


 9/5/17 13:59  8/9/17 13:25


1.5 TAB


 


 Sertraline HCl


  (Zoloft Tab)  25 mg  QAM


 PO  8/7/17 09:00


 9/6/17 08:59  8/9/17 09:00


25 MG


 


 Insulin Aspart


  (novoLOG ASPART)  SLIDING


 SCALE


 PARAMETER  ACHS


 SC  8/7/17 17:19


 9/4/17 05:59   


 











Objective


Vital Signs











  Date Time  Temp Pulse Resp B/P (MAP) Pulse Ox O2 Delivery O2 Flow Rate FiO2


 


8/9/17 07:18 36.4 63 17 127/65 (85) 91 Room Air  


 


8/9/17 07:15      Room Air  


 


8/8/17 23:15      Room Air  


 


8/8/17 22:45 37.1 62 16 128/75 (92) 94 BiPAP  


 


8/8/17 15:30      Room Air  


 


8/8/17 15:00 36.8 104 18 124/65 (84) 95 Room Air  











Physical Exam


General Appearance:  WD/WN, no apparent distress


Eyes:  sclerae normal


ENT:  hearing grossly normal


Neck:  supple, no JVD, trachea midline


Respiratory/Chest:  lungs clear, normal breath sounds, no respiratory distress, 

no accessory muscle use


Cardiovascular:  regular rate, rhythm, no gallop, no murmur


Abdomen:  normal bowel sounds, non tender, soft, + pertinent finding (midline 

surgical incision - well approximated without erythema, drainage, warmth)


Extremities:  no calf tenderness, + pertinent finding (RUE PICC without erythema

, edema, tenderness)


Neurologic/Psychiatric:  alert


Skin:  normal color, warm/dry





Laboratory Results





Last 24 Hours








Test


  8/8/17


16:47 8/8/17


20:25 8/9/17


08:05 8/9/17


12:00


 


Bedside Glucose 88 mg/dl  107 mg/dl  109 mg/dl  105 mg/dl 











Diagnostic Results





Current Inpatient Medications








 Medications


  (Trade)  Dose


 Ordered  Sig/Kalpesh


 Route  Start Time


 Stop Time Status Last Admin


Dose Admin


 


 Promethazine HCl


 12.5 mg/Sodium


 Chloride  50.5 ml @ 


 204 mls/hr  Q6H  PRN


 IV  7/27/17 16:00


 8/26/17 15:59  8/4/17 23:16


204 MLS/HR


 


 Ondansetron HCl


  (Zofran Inj)  4 mg  Q6H  PRN


 IV  7/27/17 16:00


 8/26/17 15:59  8/4/17 16:28


4 MG


 


 Albuterol/


 Ipratropium


  (Duoneb)  3 ml  Q2R  PRN


 INH  7/30/17 08:15


 8/29/17 08:14   


 


 


 Hydralazine HCl


  (HydrALAZINE INJ)  10 mg  Q6H  PRN


 IV.  7/31/17 08:30


 8/30/17 08:29  8/4/17 16:40


10 MG


 


 Naloxone HCl


  (Narcan Inj)  0.1 mg  Q5M  PRN


 IV  7/31/17 16:30


 8/30/17 16:29   


 


 


 Gabapentin


  (Neurontin Cap)  300 mg  QID


 PO  8/2/17 17:00


 9/1/17 16:59 Future hold 8/9/17 13:25


300 MG


 


 Ranitidine HCl


  (zANTac TAB)  150 mg  BID


 PO  8/2/17 21:00


 9/1/17 20:59 Future Hold 8/4/17 07:14


150 MG


 


 Metoprolol


 Tartrate


  (Lopressor Tab)  12.5 mg  BID


 PO  8/4/17 10:00


 9/3/17 09:59 Future Hold 8/4/17 09:07


12.5 MG


 


 Allopurinol


  (Zyloprim Tab)  300 mg  DAILY


 PO  8/4/17 10:00


 9/3/17 09:59 Future Hold 8/4/17 09:07


300 MG


 


 Aspirin


  (Ecotrin Tab)  81 mg  DAILY


 PO  8/4/17 10:00


 9/3/17 09:59 Future Hold 8/4/17 09:06


81 MG


 


 Triamterene/HCTZ


  (Maxzide 37.5/25


 Tab)  0.5 tab  DAILY


 PO  8/4/17 10:00


 9/3/17 09:59 Future Hold 8/4/17 09:06


0.5 TAB


 


 Cholecalciferol


  (Vitamin D Tab)  2,000


 inter.unit  DAILY


 PO  8/4/17 10:00


 9/3/17 09:59 Future Hold 8/4/17 09:07


2,000 INTER.UNIT


 


 Cyanocobalamin


  (Vitamin B-12


 Tab)  1,000 mcg  DAILY


 PO  8/4/17 10:00


 9/3/17 09:59 Future Hold 8/4/17 09:07


1,000 MCG


 


 Morphine Sulfate


  (MoRPHine


 SULFATE INJ)  2 mg  Q4H  PRN


 IV  8/4/17 17:00


 8/18/17 16:59   


 


 


 Enalaprilat 0.625


 mg/Dextrose  25.5 ml @ 


 100 mls/hr  Q8H  PRN


 IV  8/4/17 17:15


 9/3/17 17:14   


 


 


 Lorazepam 0.5 mg/


 Syringe  0.5 ml @ 


 0.5 mls/min  Q6H  PRN


 IV  8/4/17 23:45


 9/3/17 23:44  8/7/17 02:06


0.5 MLS/MIN


 


 Heparin Sodium


  (Porcine)


  (Heparin 10 Unit/


 ml 5 ml Flush)  5 ml  PRN  PRN


 FLUSH  8/6/17 02:15


 9/5/17 02:14  8/9/17 13:28


15 ML


 


 Haloperidol


 Lactate


  (Haldol Inj)  5 mg  Q24H  PRN


 IM  8/6/17 13:30


 9/5/17 13:29   


 


 


 Clonazepam


  (Klonopin Tab)  0.5 mg  HS


 PO  8/6/17 21:00


 9/5/17 20:59  8/8/17 21:39


0.5 MG


 


 Carbidopa/Levodopa


  (Sinemet 25/


 100MG Tab)  1.5 tab  TID


 PO  8/6/17 14:00


 9/5/17 13:59  8/9/17 13:25


1.5 TAB


 


 Sertraline HCl


  (Zoloft Tab)  25 mg  QAM


 PO  8/7/17 09:00


 9/6/17 08:59  8/9/17 09:00


25 MG


 


 Insulin Aspart


  (novoLOG ASPART)  SLIDING


 SCALE


 PARAMETER  ACHS


 SC  8/7/17 17:19


 9/4/17 05:59   


 











Assessment and Plan


This is a 77 yo M with PMHx of T2DM with Diabetic Neuropathy, HTN, Gout, GIULIA on 

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 

anxiety disorder who presents with abdominal pain and bloating since Tuesday. 





Sigmoid Volvulus S/P Decompression 7/28 and 7/29 and S/P Sigmoid Colectomy 7/31


- Resolved post-operative ileus - tolerating advanced diet, TPN D/Cd, and is 

having BMs


- Cipro 400 mg IV BID and Flagyl 500 mg IV Q8H course complete


- General Surgery following - request follow-up in 7-10 days





Encephalopathy - Delirium vs Medication Withdrawal: RESOLVED


- Suspect withdrawal symptoms as unable to give Sinemet, Zoloft, and Clonazepam 

when NPO





Low Urine Output: RESOLVED





T2DM with Diabetic Neuropathy: A1c 5.9 (2/22/17)


- Pt was recently taken off metformin by his PCP





Hypokalemia and Hypophosphatemia: RESOLVED


- Continue to monitor and replete as necessary





Parkinson's Disease/Restless Leg Syndrome: IMPROVING


- Gabapentin 300 mg QID and Carbidopa/Levodopa 25/100 mg 1.5 tabs TID


- Clonazepam 0.5 mg HS and Sertraline 25 mg daily





HTN: 


- Resume home medications - Metoprolol was added in hospital - will continue to 

monitor BP - will consider adding prior to D/C





Gout:


- Allopurinol 300 mg daily - will hold at this time





GIULIA: CPAP at night





DVT Prophylaxis: BERENICE/SCDs; withhold chemical prophylaxis for surgical 

intervention





Code Status: FULL RESUSCITATION





Disposition: D/C likely tomorrow with HHS and family support


Discharge planning:  home with home health

## 2017-08-10 VITALS
DIASTOLIC BLOOD PRESSURE: 68 MMHG | TEMPERATURE: 98.96 F | HEART RATE: 63 BPM | SYSTOLIC BLOOD PRESSURE: 144 MMHG | OXYGEN SATURATION: 96 %

## 2017-08-10 VITALS
TEMPERATURE: 98.96 F | DIASTOLIC BLOOD PRESSURE: 68 MMHG | SYSTOLIC BLOOD PRESSURE: 144 MMHG | HEART RATE: 63 BPM | OXYGEN SATURATION: 96 %

## 2017-08-10 VITALS — OXYGEN SATURATION: 96 %

## 2017-08-10 LAB
ANION GAP SERPL CALC-SCNC: 5 MMOL/L (ref 3–11)
BUN SERPL-MCNC: 18 MG/DL (ref 7–18)
BUN/CREAT SERPL: 17.9 (ref 10–20)
CALCIUM SERPL-MCNC: 8 MG/DL (ref 8.5–10.1)
CHLORIDE SERPL-SCNC: 107 MMOL/L (ref 98–107)
CO2 SERPL-SCNC: 28 MMOL/L (ref 21–32)
CREAT CL PREDICTED SERPL C-G-VRATE: 77.7 ML/MIN
CREAT SERPL-MCNC: 1 MG/DL (ref 0.6–1.4)
EOSINOPHIL NFR BLD AUTO: 309 K/UL (ref 130–400)
GLUCOSE SERPL-MCNC: 103 MG/DL (ref 70–99)
HCT VFR BLD CALC: 31.5 % (ref 42–52)
MCH RBC QN AUTO: 30.3 PG (ref 25–34)
MCHC RBC AUTO-ENTMCNC: 32.1 G/DL (ref 32–36)
MCV RBC AUTO: 94.6 FL (ref 80–100)
PMV BLD AUTO: 9.9 FL (ref 7.4–10.4)
POTASSIUM SERPL-SCNC: 4 MMOL/L (ref 3.5–5.1)
RBC # BLD AUTO: 3.33 M/UL (ref 4.7–6.1)
SODIUM SERPL-SCNC: 140 MMOL/L (ref 136–145)
WBC # BLD AUTO: 10.13 K/UL (ref 4.8–10.8)

## 2017-08-10 RX ADMIN — GABAPENTIN SCH MG: 300 CAPSULE ORAL at 09:38

## 2017-08-10 RX ADMIN — MAXZIDE SCH TAB: 37.5; 25 TABLET ORAL at 09:40

## 2017-08-10 RX ADMIN — INSULIN ASPART SCH UNITS: 100 INJECTION, SOLUTION INTRAVENOUS; SUBCUTANEOUS at 08:00

## 2017-08-10 RX ADMIN — GABAPENTIN SCH MG: 300 CAPSULE ORAL at 13:45

## 2017-08-10 RX ADMIN — SERTRALINE HYDROCHLORIDE SCH MG: 50 TABLET, FILM COATED ORAL at 09:38

## 2017-08-10 RX ADMIN — CARBIDOPA AND LEVODOPA SCH TAB: 25; 100 TABLET ORAL at 13:45

## 2017-08-10 RX ADMIN — RANITIDINE SCH MG: 150 TABLET ORAL at 09:39

## 2017-08-10 RX ADMIN — CARBIDOPA AND LEVODOPA SCH TAB: 25; 100 TABLET ORAL at 09:37

## 2017-08-10 RX ADMIN — ALLOPURINOL SCH MG: 300 TABLET ORAL at 09:40

## 2017-08-10 RX ADMIN — INSULIN ASPART SCH UNITS: 100 INJECTION, SOLUTION INTRAVENOUS; SUBCUTANEOUS at 12:00

## 2017-08-10 RX ADMIN — Medication PRN ML: at 05:48

## 2017-08-10 NOTE — SURGERY PROGRESS NOTE
Surgery Progress Note


Date of Service


Aug 10, 2017.





Subjective


+ feeling well, + bowel movement, No nausea





Objective


Vital Signs:











  Date Time  Temp Pulse Resp B/P (MAP) Pulse Ox O2 Delivery O2 Flow Rate FiO2


 


8/10/17 07:57     96 Room Air  


 


8/10/17 07:56 37.2 63 18 144/68 (93) 96 Room Air  


 


8/10/17 00:00      Room Air  


 


8/9/17 22:45 36.5 61 20 153/72 (99) 95 CPAP  


 


8/9/17 19:45      Room Air  





      BiPAP  


 


8/9/17 15:50      Room Air  


 


8/9/17 15:45 37.1 68 16 112/63 (79) 95 Room Air  








Abdomen:  non distended, soft


Incision(s):  clean, dry, no erythema


Laboratory Results:





Results Past 24 Hours








Test


  8/9/17


12:00 8/9/17


16:58 8/9/17


20:53 8/10/17


05:46 Range/Units


 


 


Bedside Glucose 105 100 99  70-99  mg/dl


 


White Blood Count    10.13 4.8-10.8  K/uL


 


Red Blood Count    3.33 4.7-6.1  M/uL


 


Hemoglobin    10.1 14.0-18.0  g/dL


 


Hematocrit    31.5 42-52  %


 


Mean Corpuscular Volume    94.6   fL


 


Mean Corpuscular Hemoglobin    30.3 25-34  pg


 


Mean Corpuscular Hemoglobin


Concent 


  


  


  32.1


  32-36  g/dl


 


 


RDW Standard Deviation    49.1 36.4-46.3  fL


 


RDW Coefficient of Variation    14.3 11.5-14.5  %


 


Platelet Count    309 130-400  K/uL


 


Mean Platelet Volume    9.9 7.4-10.4  fL


 


Sodium Level    140 136-145  mmol/L


 


Potassium Level    4.0 3.5-5.1  mmol/L


 


Chloride Level    107   mmol/L


 


Carbon Dioxide Level    28 21-32  mmol/L


 


Anion Gap    5.0 3-11  mmol/L


 


Blood Urea Nitrogen    18 7-18  mg/dl


 


Creatinine


  


  


  


  1.00


  0.60-1.40


mg/dl


 


Est Creatinine Clear Calc


Drug Dose 


  


  


  77.7


   ml/min


 


 


Estimated GFR (


American) 


  


  


  85.0


   


 


 


Estimated GFR (Non-


American 


  


  


  73.3


   


 


 


BUN/Creatinine Ratio    17.9 10-20  


 


Random Glucose    103 70-99  mg/dl


 


Calcium Level    8.0 8.5-10.1  mg/dl











Assessment & Plan


s/p sigmoid resection for volvulus


post op ileus, resolved


   stable for d/c, has home health/PT


   f/u next week for staple removal


   was taking Miralax regularly at home, may not need that now

## 2017-08-10 NOTE — DISCHARGE INSTRUCTIONS
Discharge Instructions


Date of Service


Aug 10, 2017.





Admission


Reason for Admission:  Volvulus





Discharge


Discharge Diagnosis / Problem:  Volvulus with Sigmoid Colectomy





Discharge Goals


Goal(s):  Decrease discomfort, Improve function, Increase independence





Activity Recommendations


Activity Limitations:  as noted below


Lifting Limitations:  gradually increase as tolerated


Exercise/Sports Limitations:  until after follow-up appointment


Shower/Bathe:  no limitations





.





Instructions / Follow-Up


Instructions / Follow-Up


Sigmoid Volvulus with Sigmoid Colectomy:


- Continue your diet as tolerated and continue to monitor for passing of gas 

and bowel movements


- Surgery would like to follow-up with you in about a week to have the staples 

removed.


- Watch the incision site for increased pain, redness, drainage, or separation 

- if you notice anything like this please come to the emergency department


- Your Miralax was moved to daily as needed for constipation - but if bowel 

movements remain an issue you may continue your previous dosing





Home Medications:


- Continue your home medications as previously prescribed 





Follow-Up:


- Please see your family doctor in 7-10 days. We will help assist with an 

appointment


- Please see your surgeon in 1 week to have staples removed and to follow-up





Current Hospital Diet


Patient's current hospital diet: Diabetes Type 2 Diet





Discharge Diet


Recommended Diet:  Diabetes Type 2 Diet





Procedures


Procedures Performed:  


Sigmoid Colectomy with primary anastomosis;enterolysis





Pending Studies


Studies pending at discharge:  no





Laboratory Results





Lipid Panel








Test


  8/5/17


05:30 Range/Units


 


 


Triglycerides Level 74  0-150  mg/dl


 


Cholesterol Level 74  0-200  mg/dl


 


HDL Cholesterol 26   mg/dl


 


Cholesterol/HDL Ratio 2.8   


 


LDL Cholesterol, Calculated 33   mg/dl











Medical Emergencies








.


Who to Call and When:





Medical Emergencies:  If at any time you feel your situation is an emergency, 

please call 911 immediately.





.





Non-Emergent Contact


Non-Emergency issues call your:  Primary Care Provider


Call Non-Emergent contact if:  you have a fever, your pain is concerning you, 

you have any medication questions





.


.








"Provider Documentation" section prepared by Bertha Antonio.








.





VTE Core Measure


Inpt VTE Proph given/why not?:  Unfractionated heparin SQ, T.E.D. Stockings, SCD

's

## 2017-08-10 NOTE — DISCHARGE SUMMARY
Discharge Summary


Date of Service


Aug 10, 2017.





Discharge Summary


Admission Date:


Jul 27, 2017 at 15:52


Discharge Date:  Aug 10, 2017


Discharge Disposition:  Home with services


Principal Diagnosis:  Volvulus S/P Sigmoid Colectomy


Problems/Secondary Diagnoses:


1. HTN


2. GIULIA on CPAP


3. T2DM with Diabetic Neuropathy (off medication)


4. DVT/PE (2014 - off anticoagulation)


5. Gout


6. Gastroparesis


7. Anxiety


8. Parkinson's 


9. Restless Legs


10. S/P Colonic Polyp Resection (Benign)


Procedures:


CT SCAN OF THE ABDOMEN AND PELVIS WITH IV CONTRAST





Lung bases: The heart is enlarged and without pericardial effusion. There are


coronary artery calcifications. The lung bases are clear noting bibasilar


atelectasis. There is a tiny hiatal hernia.





Liver: The contrast-enhanced liver is normal in size, contour, and attenuation.


There is no intrahepatic biliary ductal dilatation. The hepatic veins and portal


veins are patent.





Gallbladder: Unremarkable.





Spleen: Normal in size and attenuation. There are calcified splenic granulomas.





Pancreas: Atrophic and grossly unremarkable.





Adrenal glands: Unremarkable.





Kidneys: The contrast enhanced kidneys are atrophic and without hydronephrosis.


Next renal pelvises noted on the left. The kidneys enhance symmetrically. A left


renal cyst measures 8.5 cm.





Abdominal vasculature: The abdominal aorta is normal in course and caliber


noting advanced atherosclerotic calcification.





Bowel: There are postoperative changes from right hemicolectomy with ileocolic


anastomosis. The small bowel loops are normal in caliber. The sigmoid colon is


tortuous. There is focal twisting of the sigmoid colon upon itself seen to the


right of midline in the pelvis on axial image #[id number]. The upstream sigmoid colon


is distended and gas-filled number up to 8.5 cm in diameter. There is


inflammatory stranding with vascular congestion and trace fluid seen around the


twisted loop in the pelvis on axial image #309. The appearance is consistent


with sigmoid volvulus. No wall thickening is identified. The more proximal colon


is distended with gas and fluid. Scattered colonic diverticula are noted.





Peritoneum: There is no intraperitoneal free air or abdominal ascites.





Lymphadenopathy: None.





Pelvic viscera: The prostate gland is enlarged and heterogeneous measuring 5.7


cm in transverse diameter. There is median lobe hypertrophy. The bladder wall is


mildly thickened and trabeculated consistent with chronic outlet obstruction.





Skeletal structures: The skeletal structures are osteopenic. There is a mild


compression deformity of L5. Moderate to advanced lumbosacral spondylosis is


observed. No lytic or blastic lesions are seen.





IMPRESSION:


1. Streak and motion artifact degraded examination


2. 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 There is mesenteric edema with


venous engorgement around the twisted loop in the pelvis. Surgical consultation


is advised.


3. The upstream colon is distended and fluid-filled. No intraperitoneal free air


is seen.


4. There are postoperative changes from right hemicolectomy with ileocolic


anastomosis. The small bowel loops are normal in caliber.


5. Cardiomegaly.


6. Prostatomegaly with evidence of chronic bladder outlet obstruction.


7. Additional findings as above.





PA CHEST RADIOGRAPH AND UPRIGHT AND SUPINE AP RADIOGRAPHS OF THE ABDOMEN





FINDINGS:  There is no pneumothorax or pleural effusion. Bibasilar opacities


favor atelectasis. There may be a trace left pleural effusion. There is no


evidence of pulmonary edema. Calcified right lung nodule is noted.





There is no free air. Marked colonic distention has increased since prior exam.


Colonic loops measure up to 12 cm in caliber. There has been interval


development of small bowel dilatation since exam of July 27, 2017. There is a


paucity of rectal gas. There is no radiographic evidence of pneumatosis or


portal venous gas.





IMPRESSION:  Increase in marked colonic gaseous distention and interval


development of small bowel dilatation since CT of July 27, 2017. The findings


are consistent with a persistent distal colonic obstruction and favor a sigmoid


volvulus.  Discussed with Dr. Betancourt at time of dictation.


Consultations:


1. Gastroenterology


2. General Surgery


3. Anesthesiology


4. PT/OT





Medication Reconciliation


Changed Medications:  


Polyethylene Glycol 3350 (Bulk (Polyethylene Glycol 3350) 1 Pow Pow


17 GM PO DAILY PRN for Constipation, #255 GM (Changed from: BID)





 


Continued Medications:  


Allopurinol (Zyloprim) 300 Mg Tab


300 MG PO DAILY, TAB





Aspirin (Aspirin Ec) 81 Mg Tab


81 MG PO DAILY





Carbidopa/Levodopa (Sinemet 25MG/100MG)  Tab


1.5 TAB PO TID for 30 Days, TAB





Cholecalciferol (Vitamin D3) 2,000 Unit Cap


2000 UNITS PO DAILY for 90 Days, CAP 3 Refills





Clonazepam (Klonopin) 0.5 Mg Tab


0.25-0.5 MG PO HS PRN for Sleep, #3 TAB





Cyanocobalamin (Vitamin B12) 1,000 Mcg Tab


1000 MCG PO DAILY for 30 Days





Gabapentin (Neurontin) 300 Mg Cap


300 MG PO QID, CAP





Miconazole Nitrate (Desenex Shake Powder) 43 Appln/43 Gm Powd


1 APPLN EXT BID PRN for Affected Skin Folds for 30 Days





Ondansetron Hcl (Zofran) 4 Mg Tab


4 MG PO q6hrs PRN for Nausea, TAB





Ranitidine (Zantac) 150 Mg Tab


150 MG PO BID, TAB





Sertraline (Zoloft) 25 Mg Tab


1 TAB PO DAILY for 30 Days, #30 TAB 2 Refills





Triamterene/Hctz (Triamterene/Hctz 37.5-25MG) 1 Tab Tab


0.5 TAB PO DAILY for 30 Days, #15 TAB 0 Refills











Discharge Exam


Review of Systems:  


   Constitutional:  No fever, No chills


   ENT:  No nasal symptoms, No sore throat, No trouble swallowing


   Respiratory:  No cough, No shortness of breath


   Cardiovascular:  No chest pain, No palpitations


   Abdomen:  No pain, No nausea, No vomiting, No diarrhea, No constipation, No 

GI bleeding


   Musculoskeletal:  + problem reported (Cervical stiffness - chronic), No 

swelling, No calf pain


   Genitourinary - Male:  No dysuria, No urinary retention


   Hematologic / Lymphatic:  No abnormal bleeding/bruising, No clotting problems


   Integumentary:  No rash


Physical Exam:  


   General Appearance:  WD/WN, no apparent distress


   Eyes:  sclerae normal


   ENT:  hearing grossly normal


   Neck:  supple, no JVD, trachea midline


   Respiratory/Chest:  lungs clear, normal breath sounds, no respiratory 

distress, no accessory muscle use


   Cardiovascular:  regular rate, rhythm, no gallop, no murmur


   Abdomen / GI:  normal bowel sounds, non tender, soft, + pertinent finding (

midline surgical incision, well-approximated with stables in place - no erythema

/drainage/dehiscence/tenderness)


   Extremities:  no pedal edema


   Neurologic/Psychiatric:  alert, oriented x 3


   Skin:  normal color, warm/dry





Hospital Course


ADMISSION: This is a 75 yo M with PMHx of T2DM with Diabetic Neuropathy, HTN, 

Gout, GIULIA on CPAP, H/o DVT with PE (2014) off anticoagulation, diabetic 

gastroparesis, anxiety disorder who presents with abdominal pain and bloating 

since Tuesday.  The patient's daughter and son are present at bedside. the pt 

reports this worsening abdominal distention and bloating has never happened 

before.  He notes his last bowel movement was on Tuesday and it was very loose. 

He is not passing much gas. Denies any nausea, vomiting or abdominal pain.  He 

was able to take his morning medications today.  The patient reports having a 

colonoscopy performed last year but cannot remember the name of the provider 

who did it, but does recall it was at [facility] at [facility] [place].  He denies abnormal 

findings.  Prior to this colonoscopy, he had one completed in 2014 with Dr. anderson

, a right hemicolectomy was performed due to an enlarged polyp which was unable 

to be removed via colonoscopy, pathology was benign.





CT of the abdomen and pelvis was completed in the ER showing diffuse sigmoid 

volvulus, GI and general surgery are both on board.  GI plans to do a 

colonoscopy for decompression in the morning.  No leukocytosis, other vital 

signs are stable.





HOSPITAL COURSE: Mr. Chaudhari was admitted for a volvulus and is S/P sigmoid 

colectomy on 7/31/17. Patient was admitted with two failed decompressions by 

colonoscopy. On second colonoscopy there was evidence of mucosal ulceration and 

Cipro and Flagyl IV was initiated and a 10 day course was completed. He was 

clinically improving with advancement in diet initially. Hospital admission 

complicated by a post-operative ileus requiring NGT placement and initiation of 

TPN. In addition, due to NPO status patient was unable to be given his Sinemet, 

Zoloft, and other home medications leading to acute delirium, restlessness, 

significant tremors, and ambulatory dysfunction. Ileus resolved and his diet 

was advanced as tolerated and home medications instituted. Ambulatory status 

improved with control of Parkinsons. When oral intake was adequate his TPN was 

discontinued. Discussion with family about acute rehab took place multiple 

times but was ultimately decline. Home health services initiated with family 

support for 24/7 care. Patient is hemodynamically stable and optimal for D/C 

home with family and outpatient follow-up. Plan to remove staples per surgery 

in approx. 1 week.


Total Time Spent:  Greater than 30 minutes


This includes examination of the patient, discharge planning, medication 

reconciliation, and communication with other providers.





Discharge Instructions


Please refer to the electronic Patient Visit Report (Discharge Instructions) 

for additional information.





Additional Copies To


Eduardo Lunsford D.O.; Johnnie Reis M.D.

## 2017-08-13 ENCOUNTER — HOSPITAL ENCOUNTER (INPATIENT)
Dept: HOSPITAL 45 - C.EDB | Age: 77
LOS: 4 days | Discharge: NURSING, REHAB OR LONG-TERM CARE | DRG: 388 | End: 2017-08-17
Attending: HOSPITALIST | Admitting: HOSPITALIST
Payer: COMMERCIAL

## 2017-08-13 VITALS
OXYGEN SATURATION: 99 % | HEART RATE: 73 BPM | TEMPERATURE: 97.88 F | DIASTOLIC BLOOD PRESSURE: 78 MMHG | SYSTOLIC BLOOD PRESSURE: 173 MMHG

## 2017-08-13 VITALS
DIASTOLIC BLOOD PRESSURE: 67 MMHG | OXYGEN SATURATION: 97 % | SYSTOLIC BLOOD PRESSURE: 132 MMHG | TEMPERATURE: 98.06 F | HEART RATE: 71 BPM

## 2017-08-13 VITALS
OXYGEN SATURATION: 99 % | SYSTOLIC BLOOD PRESSURE: 127 MMHG | DIASTOLIC BLOOD PRESSURE: 74 MMHG | TEMPERATURE: 98.24 F | HEART RATE: 74 BPM

## 2017-08-13 VITALS
BODY MASS INDEX: 31.35 KG/M2 | WEIGHT: 231.49 LBS | WEIGHT: 231.49 LBS | HEIGHT: 72 IN | HEIGHT: 72 IN | BODY MASS INDEX: 31.35 KG/M2

## 2017-08-13 VITALS — OXYGEN SATURATION: 98 %

## 2017-08-13 DIAGNOSIS — M10.9: ICD-10-CM

## 2017-08-13 DIAGNOSIS — Z90.49: ICD-10-CM

## 2017-08-13 DIAGNOSIS — I81: ICD-10-CM

## 2017-08-13 DIAGNOSIS — G20: ICD-10-CM

## 2017-08-13 DIAGNOSIS — Z91.81: ICD-10-CM

## 2017-08-13 DIAGNOSIS — Z96.651: ICD-10-CM

## 2017-08-13 DIAGNOSIS — I10: ICD-10-CM

## 2017-08-13 DIAGNOSIS — F41.9: ICD-10-CM

## 2017-08-13 DIAGNOSIS — K21.9: ICD-10-CM

## 2017-08-13 DIAGNOSIS — K56.7: Primary | ICD-10-CM

## 2017-08-13 DIAGNOSIS — R11.2: ICD-10-CM

## 2017-08-13 DIAGNOSIS — Z99.89: ICD-10-CM

## 2017-08-13 DIAGNOSIS — R74.8: ICD-10-CM

## 2017-08-13 DIAGNOSIS — Z79.899: ICD-10-CM

## 2017-08-13 DIAGNOSIS — E66.9: ICD-10-CM

## 2017-08-13 DIAGNOSIS — Z87.891: ICD-10-CM

## 2017-08-13 DIAGNOSIS — K63.89: ICD-10-CM

## 2017-08-13 DIAGNOSIS — Z79.82: ICD-10-CM

## 2017-08-13 DIAGNOSIS — E86.0: ICD-10-CM

## 2017-08-13 DIAGNOSIS — F32.9: ICD-10-CM

## 2017-08-13 DIAGNOSIS — G25.81: ICD-10-CM

## 2017-08-13 DIAGNOSIS — E11.9: ICD-10-CM

## 2017-08-13 DIAGNOSIS — G47.33: ICD-10-CM

## 2017-08-13 LAB
ALBUMIN/GLOB SERPL: 0.7 {RATIO} (ref 0.9–2)
ALP SERPL-CCNC: 103 U/L (ref 45–117)
ALT SERPL-CCNC: 10 U/L (ref 12–78)
ANION GAP SERPL CALC-SCNC: 6 MMOL/L (ref 3–11)
APPEARANCE UR: CLEAR
AST SERPL-CCNC: 17 U/L (ref 15–37)
BASOPHILS # BLD: 0.01 K/UL (ref 0–0.2)
BASOPHILS NFR BLD: 0.1 %
BILIRUB UR-MCNC: (no result) MG/DL
BUN SERPL-MCNC: 16 MG/DL (ref 7–18)
BUN/CREAT SERPL: 14.4 (ref 10–20)
CALCIUM SERPL-MCNC: 9 MG/DL (ref 8.5–10.1)
CHLORIDE SERPL-SCNC: 102 MMOL/L (ref 98–107)
CO2 SERPL-SCNC: 27 MMOL/L (ref 21–32)
COLOR UR: YELLOW
COMPLETE: YES
CREAT CL PREDICTED SERPL C-G-VRATE: 71.6 ML/MIN
CREAT SERPL-MCNC: 1.1 MG/DL (ref 0.6–1.4)
EOSINOPHIL NFR BLD AUTO: 455 K/UL (ref 130–400)
GLOBULIN SER-MCNC: 4.5 GM/DL (ref 2.5–4)
GLUCOSE SERPL-MCNC: 153 MG/DL (ref 70–99)
HCT VFR BLD CALC: 37.7 % (ref 42–52)
IG%: 0.6 %
IMM GRANULOCYTES NFR BLD AUTO: 9.3 %
INR PPP: 1.1 (ref 0.9–1.1)
LYMPHOCYTES # BLD: 1.15 K/UL (ref 1.2–3.4)
MANUAL MICROSCOPIC REQUIRED?: NO
MCH RBC QN AUTO: 30.1 PG (ref 25–34)
MCHC RBC AUTO-ENTMCNC: 31.8 G/DL (ref 32–36)
MCV RBC AUTO: 94.5 FL (ref 80–100)
MONOCYTES NFR BLD: 11.8 %
NEUTROPHILS # BLD AUTO: 0.3 %
NEUTROPHILS NFR BLD AUTO: 77.9 %
NITRITE UR QL STRIP: (no result)
PARTIAL THROMBOPLASTIN RATIO: 1
PH UR STRIP: 6 [PH] (ref 4.5–7.5)
PMV BLD AUTO: 10 FL (ref 7.4–10.4)
POTASSIUM SERPL-SCNC: 4 MMOL/L (ref 3.5–5.1)
PROTHROMBIN TIME: 11.7 SECONDS (ref 9–12)
RBC # BLD AUTO: 3.99 M/UL (ref 4.7–6.1)
REVIEW REQ?: NO
SODIUM SERPL-SCNC: 135 MMOL/L (ref 136–145)
SP GR UR STRIP: 1.04 (ref 1–1.03)
URINE BILL WITH OR WITHOUT MIC: (no result)
UROBILINOGEN UR-MCNC: (no result) MG/DL
WBC # BLD AUTO: 12.42 K/UL (ref 4.8–10.8)
ZZUR CULT IF INDIC CLEAN CATCH: NO

## 2017-08-13 RX ADMIN — SODIUM CHLORIDE SCH MLS/HR: 900 INJECTION, SOLUTION INTRAVENOUS at 23:17

## 2017-08-13 NOTE — EMERGENCY ROOM VISIT NOTE
History


Report prepared by Uma:  Natanael Geiger


Under the Supervision of:  Dr. Arcenio Bran M.D.


First contact with patient:  16:51


Chief Complaint:  GI ASSESSMENT


Stated Complaint:  S/P SURGERY- UPSET STOMACH, DISTENDED BELLY


Nursing Triage Summary:  


Pt had surgery for "sigmoid volvolus with colectomy" here, was discharged on 


8/10. Unwitnessed fall yesterday at 0100, low appetite, distended abdomen, 


nausea, dry heaves, and weakness since then.





History of Present Illness


The patient is a 77 year old male who presents to the Emergency Room with 

complaints of a sudden fall that occurred yesterday at 0100.  The patient 

admits to a history of a sigmoid colectomy due to a volvulus when he was 

admitted from July 27 to August 10 at [facility]. He states that he was discharged 

and felt fine following his admission. The patient states that yesterday at 

0100 he lost his balance because he has been "having trouble with balance" for 

the last couple of months. He states that he fell backwards but did not have 

any pain or injury following the incident. The patient states that since the 

fall he has been experiencing abdominal bloating similar to his symptoms at the 

end of July, he has also had indigestion the last few days. He states that his 

abdomen has been hard and he is not able to "suck in his stomach". The patient 

is accompanied by his daughter who states that he has been experiencing a lack 

of appetite starting yesterday. She states that she gave him toast and tapioca 

pudding today, but denies any relief of symptoms. His daughter states that he 

has been experiencing nausea, dry heaving, burping, and vomiting today. She 

states that his episode of vomiting occurred about two hours prior to arrival 

and describes it as "slimy". The patient's daughter states he had a bowel 

movement and had a small accidental bowel movement before arrival to the ED. 

She reports that he forgot to take his indigestion medication yesterday, but 

she gave him two pills today. He denies any cough, fever, abdominal pain, and 

urinary symptoms.





   Source of History:  patient, family


   Onset:  0100 yesterday


   Position:  other (global)


   Timing:  other


   Associated Symptoms:  + nausea, + vomiting, No fevers, No cough, No 

abdominal pain, No urinary symptoms





Review of Systems


See HPI for pertinent positives & negatives. A total of 10 systems reviewed and 

were otherwise negative.





Past Medical & Surgical


Medical Problems:


(1) BENIGN HYPERTENSION


(2) DIAB W KETOACIDOSIS, TYPE II OR UNSPEC TYPE, UNCONTROLLED


(3) DIVERTICULOSIS COLON (W/O MENT OF HEMORRHAGE)


(4) GOUT NOS


(5) IDIO PERIPH NEURPTHY NOS


(6) Ileus following gastrointestinal surgery


(7) Mesenteric venous thrombosis


(8) MIXED HYPERLIPIDEMIA


(9) Volvulus








Family History





Diabetes mellitus


FH: breast cancer





Social History


Smoking Status:  Former Smoker


Alcohol Use:  none


Drug Use:  none


Marital Status:  , 


Housing Status:  lives with significant other


Occupation Status:  retired





Current/Historical Medications


Scheduled


Allopurinol (Zyloprim), 300 MG PO DAILY


Aspirin (Aspirin Ec), 81 MG PO DAILY


Carbidopa/Levodopa (Sinemet 25MG/100MG), 1.5 TAB PO TID


Cholecalciferol (Vitamin D3), 2,000 UNITS PO DAILY


Cyanocobalamin (Vitamin B12), 1,000 MCG PO DAILY


Gabapentin (Neurontin), 300 MG PO QID


Ranitidine (Zantac), 150 MG PO BID


Sertraline (Zoloft), 1 TAB PO DAILY


Triamterene/Hctz (Triamterene/Hctz 37.5-25MG), 0.5 TAB PO DAILY





Scheduled PRN


Clonazepam (Klonopin), 0.25-0.5 MG PO HS PRN for Sleep


Miconazole Nitrate (Desenex Shake Powder), 1 APPLN EXT BID PRN for Affected 

Skin Folds


Ondansetron Hcl (Zofran), 4 MG PO q6hrs PRN for Nausea


Polyethylene Glycol 3350 (Bulk (Polyethylene Glycol 3350), 17 GM PO DAILY PRN 

for Constipation





Allergies


Coded Allergies:  


     No Known Allergies (Verified , 2/21/17)





Physical Exam


Vital Signs











  Date Time  Temp Pulse Resp B/P (MAP) Pulse Ox O2 Delivery O2 Flow Rate FiO2


 


8/13/17 20:01  74 20 127/74 96 Room Air  


 


8/13/17 18:22  71 22 135/71 96 Room Air  


 


8/13/17 17:24  77      


 


8/13/17 16:45 36.8 84 16 129/67 93 Room Air  











Physical Exam


GENERAL: Patient is in no acute distress.


HEENT: No acute trauma, normocephalic atraumatic, mucous membranes moist, no 

nasal congestion, no scleral icterus.


NECK: No stridor, no adenopathy, no meningismus, trachea is midline.


LUNGS: Crackles at right base, breath sounds otherwise clear and equal.


HEART: Without murmurs gallops or rubs, regular rate and rhythm.


ABDOMEN: Abdominal distension present, hyperactive bowel sounds, tympany with 

percussion, no peritonitis, midline surgical incision with staples, no signs of 

wound infection.


EXTREMITIES: No cyanosis or edema, full range of motion of all the joints 

without pain or difficulty, no signs for acute trauma.


NEUROLOGIC: Oriented x 3, no acute motor or sensory deficits, no focal weakness.


SKIN: No rash, no jaundice, no diaphoresis.





Medical Decision & Procedures


ER Provider


Diagnostic Interpretation:


Radiology results as stated below per my review and radiologist interpretation:





CHEST ONE VIEW PORTABLE





CLINICAL HISTORY: Abdominal pain.    





COMPARISON STUDY:  Chest radiograph July 29, 2017.





FINDINGS: Lung volumes are diminished. A calcific density projecting of the


right lower lung is unchanged. There is no pneumothorax or pleural effusion.


Mild cardiomegaly is unchanged. There is no evidence of pulmonary edema. The


appearance of the chest is unchanged. Minimal bibasilar opacities favor


atelectasis. Prominent gas-filled loops of bowel are noted, the largest of which


within the right upper quadrant.





 IMPRESSION:  





1. No acute cardiopulmonary findings.





2. Left basilar opacity suggestive of atelectasis.





3. Partially visualized dilated loops of bowel, likely reflecting colonic


distention, within the upper abdomen. This can be assessed on subsequent CT.











Electronically signed by:  Roge Diaz M.D.


8/13/2017 5:41 PM





Dictated Date/Time:  8/13/2017 5:38 PM








CT OF THE ABDOMEN AND PELVIS WITH CONTRAST





CLINICAL HISTORY: Abdominal pain status post surgery. Possible obstruction.    





COMPARISON STUDY:  CT of the abdomen and pelvis July 27, 2017 and KUB August 4, 2017.





TECHNIQUE: Following IV administration of 115 mL of Optiray-320, axial images of


the abdomen and pelvis were obtained from the lung bases to the proximal femurs.


Images were reviewed in the axial, sagittal, and coronal planes. IV contrast was


administered without complication.  A dose lowering technique was utilized


adhering to the principles of ALARA.








CT DOSE: 811.32 mGy.cm





FINDINGS: Visualized portions of the lower lungs demonstrate linear and ground


glass opacities which favor atelectasis. No pneumatosis, free air or portal


venous gas is present. The liver, spleen, adrenal glands and pancreas are


unremarkable. The right kidney is within normal limits. A left renal cyst is


noted. There is no hydronephrosis. There is a suspected subcentimeter cyst


arising from the upper pole of the left kidney. There are findings consistent


with a remote right hemicolectomy and a recent sigmoid resection. There is mild


infiltration within the operative bed. Note is made of a 4.4 x 2.4 cm suspected


fluid collection immediately superior to the sigmoid anastomosis. This has


minimal peripheral enhancement and mild adjacent infiltration. No additional


fluid collections are identified on this exam. The colon is moderately dilated


but no transition point is identified. There is no evidence for a small bowel


obstruction. Surgical skin staples from laparotomy are noted. There is a


moderate amount of poorly formed stool within the distal colon and rectum. Gas


within the bladder is likely from recent instrumentation. Note is made of a


probable filling defect within a mesenteric vein which drains into the superior


mesenteric vein. This vein is shown best on axial image 227 of 511. No


suspicious osseous lesions are present.











IMPRESSION:  





1. Findings consistent with recent sigmoid resection. 4.4 x 2.4 cm fluid


collection immediately superior to the sigmoid anastomosis with mild peripheral


enhancement and associated infiltration. This fluid collection is nonspecific


could reflect a resolving hematoma or abscess.





2. Probable filling defect within a left mesenteric vein which drains into the


superior mesenteric vein. This suggests mesenteric venous thrombus.





3. Moderate colonic distention without convincing evidence for a bowel


obstruction.











Electronically signed by:  Roge Diaz M.D.


8/13/2017 6:52 PM





Dictated Date/Time:  8/13/2017 6:36 PM





Laboratory Results


8/13/17 17:10








Red Blood Count 3.99, Mean Corpuscular Volume 94.5, Mean Corpuscular Hemoglobin 

30.1, Mean Corpuscular Hemoglobin Concent 31.8, Mean Platelet Volume 10.0, 

Neutrophils (%) (Auto) 77.9, Lymphocytes (%) (Auto) 9.3, Monocytes (%) (Auto) 

11.8, Eosinophils (%) (Auto) 0.3, Basophils (%) (Auto) 0.1, Neutrophils # (Auto

) 9.68, Lymphocytes # (Auto) 1.15, Monocytes # (Auto) 1.47, Eosinophils # (Auto

) 0.04, Basophils # (Auto) 0.01





8/13/17 17:10

















Test


  8/13/17


17:10


 


White Blood Count


  12.42 K/uL


(4.8-10.8)


 


Red Blood Count


  3.99 M/uL


(4.7-6.1)


 


Hemoglobin


  12.0 g/dL


(14.0-18.0)


 


Hematocrit 37.7 % (42-52) 


 


Mean Corpuscular Volume


  94.5 fL


()


 


Mean Corpuscular Hemoglobin


  30.1 pg


(25-34)


 


Mean Corpuscular Hemoglobin


Concent 31.8 g/dl


(32-36)


 


Platelet Count


  455 K/uL


(130-400)


 


Mean Platelet Volume


  10.0 fL


(7.4-10.4)


 


Neutrophils (%) (Auto) 77.9 % 


 


Lymphocytes (%) (Auto) 9.3 % 


 


Monocytes (%) (Auto) 11.8 % 


 


Eosinophils (%) (Auto) 0.3 % 


 


Basophils (%) (Auto) 0.1 % 


 


Neutrophils # (Auto)


  9.68 K/uL


(1.4-6.5)


 


Lymphocytes # (Auto)


  1.15 K/uL


(1.2-3.4)


 


Monocytes # (Auto)


  1.47 K/uL


(0.11-0.59)


 


Eosinophils # (Auto)


  0.04 K/uL


(0-0.5)


 


Basophils # (Auto)


  0.01 K/uL


(0-0.2)


 


RDW Standard Deviation


  48.1 fL


(36.4-46.3)


 


RDW Coefficient of Variation


  14.0 %


(11.5-14.5)


 


Immature Granulocyte % (Auto) 0.6 % 


 


Immature Granulocyte # (Auto)


  0.07 K/uL


(0.00-0.02)


 


Red Blood Cell Morphology Unremarkable 


 


Prothrombin Time


  11.7 SECONDS


(9.0-12.0)


 


Prothromb Time International


Ratio 1.1 (0.9-1.1) 


 


 


Activated Partial


Thromboplast Time 25.4 SECONDS


(21.0-31.0)


 


Partial Thromboplastin Ratio 1.0 


 


Anion Gap


  6.0 mmol/L


(3-11)


 


Est Creatinine Clear Calc


Drug Dose 71.6 ml/min 


 


 


Estimated GFR (


American) 75.2 


 


 


Estimated GFR (Non-


American 64.9 


 


 


BUN/Creatinine Ratio 14.4 (10-20) 


 


Calcium Level


  9.0 mg/dl


(8.5-10.1)


 


Total Bilirubin


  0.7 mg/dl


(0.2-1)


 


Aspartate Amino Transf


(AST/SGOT) 17 U/L (15-37) 


 


 


Alanine Aminotransferase


(ALT/SGPT) 10 U/L (12-78) 


 


 


Alkaline Phosphatase


  103 U/L


()


 


Troponin I


  < 0.015 ng/ml


(0-0.045)


 


Total Protein


  7.7 gm/dl


(6.4-8.2)


 


Albumin


  3.2 gm/dl


(3.4-5.0)


 


Globulin


  4.5 gm/dl


(2.5-4.0)


 


Albumin/Globulin Ratio 0.7 (0.9-2) 


 


Lipase


  418 U/L


()





Laboratory results reviewed by me.





Medications Administered











 Medications


  (Trade)  Dose


 Ordered  Sig/Kalpesh


 Route  Start Time


 Stop Time Status Last Admin


Dose Admin


 


 Sodium Chloride  500 ml @ 


 999 mls/hr  Q31M STAT


 IV  8/13/17 17:01


 8/13/17 17:31 DC 8/13/17 17:31


999 MLS/HR


 


 Ondansetron HCl


  (Zofran Inj)  4 mg  NOW  STAT


 IV  8/13/17 17:01


 8/13/17 17:04 DC 8/13/17 17:31


4 MG











ECG


Indication:  other (falling incident)


Rate (beats per minute):  77


Rhythm:  normal sinus


Findings:  no acute ischemic change, no ectopy, other (LVH present)





ED Course


1653: The patient was evaluated in room C05. A complete history and physical 

exam was performed.





1701: Zofran Injection 4 mg IV, Sodium Chloride 500 ml @ 999 mls/hr IV.





1900: I discussed the patient's case with Dr. Gandhi, [facility]. 





1916: I reevaluated the patient and he is resting comfortably. I updated him on 

his results and discussed his treatment plan. He agrees to admission.





1918: I discussed the patient's case with Dr. Mar Lozano, [facility] Hospitalist. 

She understands the patient's condition and agrees to accept the patient. The 

patient will be further evaluated.





Medical Decision


The patient is a 77 year old male who presents to the ED with complaints of a 

sudden fall that occurred 0100 yesterday.  Differential diagnoses considered 

include ileus or bowel obstruction, constipation, abscess, dehydration, 

electrolyte imbalance, pneumonia, cardiac ischemia, hernia or peritonitis, and 

wound infection.





There is a mild leukocytosis which could be consistent with his vomiting or 

possibly infection.  No worrisome anemia.  No significant electrolyte 

abnormality, kidney failure or hepatitis.  Lipase mildly elevated but not truly 

high enough to diagnose pancreatitis.  Urinalysis does not show infection.  EKG 

shows a normal sinus rhythm, no acute ischemia.  Cardiac enzyme testing 1 is 

not consistent with acute cardiac injury.  Chest x-ray shows no free air or 

pneumonia.  Abdominal and pelvis CT shows colonic air, no bowel obstruction.  

There was a presumed hematoma in the area of his recent colonic surgery.  No 

abscess noted.  There was mention of a possible filling defect in one of the 

mesenteric veins.





The patient was not having pain, he was not in distress.  He received IV Zofran 

for nausea and IV saline.





I discussed the case with the on-call surgeon.  Admission/observation was felt 

warranted.  The patient understands the need for the hospital stay.  I did 

speak to case management and the on-call hospitalist was consulted.  





In short, the patient appears to have a colonic ileus as a cause for his 

presentation.





Medication Reconcilliation


Current Medication List:  was personally reviewed by me





Blood Pressure Screening


Patient's blood pressure:  Elevated blood pressure


Blood pressure disposition:  Elevated BP felt to be situational





Consults


Time Called:  1900


Consulting Physician[facility]   [facility]


Returned Call:  1900


I discussed the patient's case with Dr. Gandhi [facility]. He reports 

that the patient may be admitted.


Additional Consults:  


   Time Called:  1918


   Consulted Physician:  Dr. Mar Lozano, [facility] Hospitalist


   Returned Call:  1918


Additional Comments:


 I discussed the patient's case with Dr. Mar Lozano, [facility] Hospitalist. She 

understands the patient's condition and agrees to accept the patient. The 

patient will be further evaluated.





Impression





 Primary Impression:  


 Vomiting


 Additional Impressions:  


 Abdominal distension


 Status post partial colectomy





Scribe Attestation


The scribe's documentation has been prepared under my direction and personally 

reviewed by me in its entirety. I confirm that the note above accurately 

reflects all work, treatment, procedures, and medical decision making performed 

by me.





Departure Information


Dispostion


Being Evaluated By Hospitalist (Dr. Mar Lozano)





Referrals


Johnnie Reis M.D. (PCP)





Patient Instructions


My [facility]





Problem Qualifiers

## 2017-08-13 NOTE — DIAGNOSTIC IMAGING REPORT
CT OF THE ABDOMEN AND PELVIS WITH CONTRAST



CLINICAL HISTORY: Abdominal pain status post surgery. Possible obstruction.    



COMPARISON STUDY:  CT of the abdomen and pelvis July 27, 2017 and KUB August 4, 2017.



TECHNIQUE: Following IV administration of 115 mL of Optiray-320, axial images of

the abdomen and pelvis were obtained from the lung bases to the proximal femurs.

Images were reviewed in the axial, sagittal, and coronal planes. IV contrast was

administered without complication.  A dose lowering technique was utilized

adhering to the principles of ALARA.





CT DOSE: 811.32 mGy.cm



FINDINGS: Visualized portions of the lower lungs demonstrate linear and ground

glass opacities which favor atelectasis. No pneumatosis, free air or portal

venous gas is present. The liver, spleen, adrenal glands and pancreas are

unremarkable. The right kidney is within normal limits. A left renal cyst is

noted. There is no hydronephrosis. There is a suspected subcentimeter cyst

arising from the upper pole of the left kidney. There are findings consistent

with a remote right hemicolectomy and a recent sigmoid resection. There is mild

infiltration within the operative bed. Note is made of a 4.4 x 2.4 cm suspected

fluid collection immediately superior to the sigmoid anastomosis. This has

minimal peripheral enhancement and mild adjacent infiltration. No additional

fluid collections are identified on this exam. The colon is moderately dilated

but no transition point is identified. There is no evidence for a small bowel

obstruction. Surgical skin staples from laparotomy are noted. There is a

moderate amount of poorly formed stool within the distal colon and rectum. Gas

within the bladder is likely from recent instrumentation. Note is made of a

probable filling defect within a mesenteric vein which drains into the superior

mesenteric vein. This vein is shown best on axial image 227 of 511. No

suspicious osseous lesions are present.







IMPRESSION:  



1. Findings consistent with recent sigmoid resection. 4.4 x 2.4 cm fluid

collection immediately superior to the sigmoid anastomosis with mild peripheral

enhancement and associated infiltration. This fluid collection is nonspecific

could reflect a resolving hematoma or abscess.



2. Probable filling defect within a left mesenteric vein which drains into the

superior mesenteric vein. This suggests mesenteric venous thrombus.



3. Moderate colonic distention without convincing evidence for a bowel

obstruction.







Electronically signed by:  Roge Diaz M.D.

8/13/2017 6:52 PM



Dictated Date/Time:  8/13/2017 6:36 PM

## 2017-08-13 NOTE — DIAGNOSTIC IMAGING REPORT
CHEST ONE VIEW PORTABLE



CLINICAL HISTORY: Abdominal pain.    



COMPARISON STUDY:  Chest radiograph July 29, 2017.



FINDINGS: Lung volumes are diminished. A calcific density projecting of the

right lower lung is unchanged. There is no pneumothorax or pleural effusion.

Mild cardiomegaly is unchanged. There is no evidence of pulmonary edema. The

appearance of the chest is unchanged. Minimal bibasilar opacities favor

atelectasis. Prominent gas-filled loops of bowel are noted, the largest of which

within the right upper quadrant.



 IMPRESSION:  



1. No acute cardiopulmonary findings.



2. Left basilar opacity suggestive of atelectasis.



3. Partially visualized dilated loops of bowel, likely reflecting colonic

distention, within the upper abdomen. This can be assessed on subsequent CT.







Electronically signed by:  Roge Diaz M.D.

8/13/2017 5:41 PM



Dictated Date/Time:  8/13/2017 5:38 PM

## 2017-08-13 NOTE — HISTORY AND PHYSICAL
History & Physical


Date & Time of Service:


Aug 13, 2017 at 22:42


Chief Complaint:


Ileus Following Gastrointestinal Sugery,Mesenteric


Primary Care Physician:


Johnnie Reis M.D.


History of Present Illness


Source:  patient, family


Patient's history was primarily taken from his daughter Olya





The patient is a 78 yo m with a history of DMII ( no meds), Parkinson's and HTN 

with a recent history of sigmoidectomy (july 2017) secondary to volvulus done 

by Dr Kent. The patient was discharged home after surgery and patient was 

tolerating PO at that time. Patient was d/c on August 10th. Approx 2 days prior 

to admission the patient started to suffer from a sensation of abdominal 

discomfort but not pain. He notes that " I just did not feel well." He also had 

a poor appetite on that day. This morning he had worsening abdominal discomfort 

and was unable to tolerate anything oral. He had nausea throughout the day and 

one episode of vomitus. The daughter was concerned because these symptoms were 

similar to the previous time he was admitted to the hospital and vomiting 

resulted in an aspiration pneumonia. She also notes that she had a lot of 

concern because over the past 24 hour he has had significant worsening 

distention of his abdomen. The patient repeatedly denied every having any pain. 

Last BM was night before and was WNL without blood/ black stool. He has been 

unable to take any of his medications because of poor appetite.


   He was evaluated in the ED by the ED physician and Dr Gandhi was 

consulted. He had advised NPO status and reevaluation in the morning. Imaging 

did  reveal distention without obstruction but also a filling defect concerning 

for a mesenteric thrombosis.





Past Medical/Surgical History


HTN


GIULIA on CPAP


DMII no medications


Gout


Gastroparesis 


Anxiety


Depression


Parkinson


RLS


Polypectomy of colon; benign


s/p sigmoidectomy 


H/O DVT/ PE 2014


s/p Right knee replacement





Family History





Diabetes mellitus


FH: breast cancer





Social History


Smoking Status:  Former Smoker


Smokeless Tobacco Use:  No


Alcohol Use:  none


Drug Use:  none


Marital Status:  , 


Housing status:  lives with family


Occupational Status:  retired





Immunizations


History of Influenza Vaccine:  Unknown


History of Tetanus Vaccine?:  Unknown


History of Pneumococcal:  Unknown


History of Hepatitis B Vaccine:  Unknown





Multi-Drug Resistant Organisms


History of MDRO:  No





Allergies


Coded Allergies:  


     No Known Allergies (Verified , 2/21/17)





Home Medications


Scheduled


Allopurinol (Zyloprim), 300 MG PO DAILY


Aspirin (Aspirin Ec), 81 MG PO DAILY


Carbidopa/Levodopa (Sinemet 25MG/100MG), 1.5 TAB PO TID


Cholecalciferol (Vitamin D3), 2,000 UNITS PO DAILY


Cyanocobalamin (Vitamin B12), 1,000 MCG PO DAILY


Gabapentin (Neurontin), 300 MG PO QID


Ranitidine (Zantac), 150 MG PO BID


Sertraline (Zoloft), 1 TAB PO DAILY


Triamterene/Hctz (Triamterene/Hctz 37.5-25MG), 0.5 TAB PO DAILY





Scheduled PRN


Clonazepam (Klonopin), 0.25-0.5 MG PO HS PRN for Sleep


Miconazole Nitrate (Desenex Shake Powder), 1 APPLN EXT BID PRN for Affected 

Skin Folds


Ondansetron Hcl (Zofran), 4 MG PO q6hrs PRN for Nausea


Polyethylene Glycol 3350 (Bulk (Polyethylene Glycol 3350), 17 GM PO DAILY PRN 

for Constipation





Review of Systems


Constitutional:  No fever


Eyes:  No worsening of vision


ENT:  No hearing loss


Respiratory:  No cough, No shortness of breath, No dyspnea at rest


Cardiovascular:  No chest pain


Abdomen:  + nausea, + vomiting, + problem reported (distention), No pain, No 

diarrhea, No constipation


Musculoskeletal:  No joint pain, No muscle pain


Genitourinary - Male:  No hematuria, No dysuria


Neurologic:  + balance problems, No weakness, No numbness/tingling


Psychiatric:  No depression symptoms


Endocrine:  No fatigue


Hematologic / Lymphatic:  No abnormal bleeding/bruising


Integumentary:  No rash





Physical Exam


Vital Signs











  Date Time  Temp Pulse Resp B/P (MAP) Pulse Ox O2 Delivery O2 Flow Rate FiO2


 


8/13/17 21:30 36.6 73 20 173/78 (109) 99 Room Air  


 


8/13/17 21:13  70 20 151/70 98 Room Air  


 


8/13/17 20:57 36.8 74 20 127/74 99 Room Air  


 


8/13/17 20:01  74 20 127/74 96 Room Air  


 


8/13/17 18:22  71 22 135/71 96 Room Air  


 


8/13/17 17:24  77      


 


8/13/17 16:45 36.8 84 16 129/67 93 Room Air  








General Appearance:  no apparent distress


Head:  normocephalic, atraumatic


Eyes:  normal inspection


ENT:  normal ENT inspection, + pertinent finding (moist mucus membranes)


Neck:  supple


Respiratory/Chest:  normal breath sounds, no respiratory distress, no accessory 

muscle use


Cardiovascular:  regular rate, rhythm, no JVD, no murmur, normal peripheral 

pulses, + pertinent finding (BERENICE stockings in place)


Abdomen/GI:  normal bowel sounds, non tender, + distended


Back:  normal inspection, no CVA tenderness


Extremities/Musculoskelatal:  normal inspection, no calf tenderness, no pedal 

edema


Neurologic/Psych:  alert, oriented x 3, + pertinent finding (flat affect)


Skin:  normal color, warm/dry, no rash


Lymphatic:  no adenopathy





Diagnostics


Laboratory Results





Results Past 24 Hours








Test


  8/13/17


17:10 8/13/17


20:55 Range/Units


 


 


White Blood Count 12.42  4.8-10.8  K/uL


 


Red Blood Count 3.99  4.7-6.1  M/uL


 


Hemoglobin 12.0  14.0-18.0  g/dL


 


Hematocrit 37.7  42-52  %


 


Mean Corpuscular Volume 94.5    fL


 


Mean Corpuscular Hemoglobin 30.1  25-34  pg


 


Mean Corpuscular Hemoglobin


Concent 31.8


  


  32-36  g/dl


 


 


Platelet Count 455  130-400  K/uL


 


Mean Platelet Volume 10.0  7.4-10.4  fL


 


Neutrophils (%) (Auto) 77.9   %


 


Lymphocytes (%) (Auto) 9.3   %


 


Monocytes (%) (Auto) 11.8   %


 


Eosinophils (%) (Auto) 0.3   %


 


Basophils (%) (Auto) 0.1   %


 


Neutrophils # (Auto) 9.68  1.4-6.5  K/uL


 


Lymphocytes # (Auto) 1.15  1.2-3.4  K/uL


 


Monocytes # (Auto) 1.47  0.11-0.59  K/uL


 


Eosinophils # (Auto) 0.04  0-0.5  K/uL


 


Basophils # (Auto) 0.01  0-0.2  K/uL


 


RDW Standard Deviation 48.1  36.4-46.3  fL


 


RDW Coefficient of Variation 14.0  11.5-14.5  %


 


Immature Granulocyte % (Auto) 0.6   %


 


Immature Granulocyte # (Auto) 0.07  0.00-0.02  K/uL


 


Red Blood Cell Morphology Unremarkable   


 


Prothrombin Time


  11.7


  


  9.0-12.0


SECONDS


 


Prothromb Time International


Ratio 1.1


  


  0.9-1.1  


 


 


Activated Partial


Thromboplast Time 25.4


  


  21.0-31.0


SECONDS


 


Partial Thromboplastin Ratio 1.0   


 


Sodium Level 135  136-145  mmol/L


 


Potassium Level 4.0  3.5-5.1  mmol/L


 


Chloride Level 102    mmol/L


 


Carbon Dioxide Level 27  21-32  mmol/L


 


Anion Gap 6.0  3-11  mmol/L


 


Blood Urea Nitrogen 16  7-18  mg/dl


 


Creatinine


  1.10


  


  0.60-1.40


mg/dl


 


Est Creatinine Clear Calc


Drug Dose 71.6


  


   ml/min


 


 


Estimated GFR (


American) 75.2


  


   


 


 


Estimated GFR (Non-


American 64.9


  


   


 


 


BUN/Creatinine Ratio 14.4  10-20  


 


Random Glucose 153  70-99  mg/dl


 


Calcium Level 9.0  8.5-10.1  mg/dl


 


Total Bilirubin 0.7  0.2-1  mg/dl


 


Aspartate Amino Transf


(AST/SGOT) 17


  


  15-37  U/L


 


 


Alanine Aminotransferase


(ALT/SGPT) 10


  


  12-78  U/L


 


 


Alkaline Phosphatase 103    U/L


 


Troponin I < 0.015  0-0.045  ng/ml


 


Total Protein 7.7  6.4-8.2  gm/dl


 


Albumin 3.2  3.4-5.0  gm/dl


 


Globulin 4.5  2.5-4.0  gm/dl


 


Albumin/Globulin Ratio 0.7  0.9-2  


 


Lipase 418    U/L


 


Urine Color  YELLOW  


 


Urine Appearance  CLEAR CLEAR  


 


Urine pH  6.0 4.5-7.5  


 


Urine Specific Gravity  1.041 1.000-1.030  


 


Urine Protein  NEG NEG  


 


Urine Glucose (UA)  NEG NEG  


 


Urine Ketones  NEG NEG  


 


Urine Occult Blood  NEG NEG  


 


Urine Nitrite  NEG NEG  


 


Urine Bilirubin  NEG NEG  


 


Urine Urobilinogen  NEG NEG  


 


Urine Leukocyte Esterase  NEG NEG  











Diagnostic Radiology


CT OF THE ABDOMEN AND PELVIS WITH CONTRAST





CLINICAL HISTORY: Abdominal pain status post surgery. Possible obstruction.    





COMPARISON STUDY:  CT of the abdomen and pelvis July 27, 2017 and [facility] August 4, 2017.





TECHNIQUE: Following IV administration of 115 mL of Optiray-320, axial images of


the abdomen and pelvis were obtained from the lung bases to the proximal femurs.


Images were reviewed in the axial, sagittal, and coronal planes. IV contrast was


administered without complication.  A dose lowering technique was utilized


adhering to the principles of ALARA.








CT DOSE: 811.32 mGy.cm





FINDINGS: Visualized portions of the lower lungs demonstrate linear and ground


glass opacities which favor atelectasis. No pneumatosis, free air or portal


venous gas is present. The liver, spleen, adrenal glands and pancreas are


unremarkable. The right kidney is within normal limits. A left renal cyst is


noted. There is no hydronephrosis. There is a suspected subcentimeter cyst


arising from the upper pole of the left kidney. There are findings consistent


with a remote right hemicolectomy and a recent sigmoid resection. There is mild


infiltration within the operative bed. Note is made of a 4.4 x 2.4 cm suspected


fluid collection immediately superior to the sigmoid anastomosis. This has


minimal peripheral enhancement and mild adjacent infiltration. No additional


fluid collections are identified on this exam. The colon is moderately dilated


but no transition point is identified. There is no evidence for a small bowel


obstruction. Surgical skin staples from laparotomy are noted. There is a


moderate amount of poorly formed stool within the distal colon and rectum. Gas


within the bladder is likely from recent instrumentation. Note is made of a


probable filling defect within a mesenteric vein which drains into the superior


mesenteric vein. This vein is shown best on axial image 227 of 511. No


suspicious osseous lesions are present.











IMPRESSION:  





1. Findings consistent with recent sigmoid resection. 4.4 x 2.4 cm fluid


collection immediately superior to the sigmoid anastomosis with mild peripheral


enhancement and associated infiltration. This fluid collection is nonspecific


could reflect a resolving hematoma or abscess.





2. Probable filling defect within a left mesenteric vein which drains into the


superior mesenteric vein. This suggests mesenteric venous thrombus.





3. Moderate colonic distention without convincing evidence for a bowel


obstruction.








CHEST ONE VIEW PORTABLE





CLINICAL HISTORY: Abdominal pain.    





COMPARISON STUDY:  Chest radiograph July 29, 2017.





FINDINGS: Lung volumes are diminished. A calcific density projecting of the


right lower lung is unchanged. There is no pneumothorax or pleural effusion.


Mild cardiomegaly is unchanged. There is no evidence of pulmonary edema. The


appearance of the chest is unchanged. Minimal bibasilar opacities favor


atelectasis. Prominent gas-filled loops of bowel are noted, the largest of which


within the right upper quadrant.





 IMPRESSION:  





1. No acute cardiopulmonary findings.





2. Left basilar opacity suggestive of atelectasis.





3. Partially visualized dilated loops of bowel, likely reflecting colonic


distention, within the upper abdomen. This can be assessed on subsequent CT.





EKG


Normal sinus rhythm


Voltage criteria for left ventricular hypertrophy


Abnormal ECG


When compared with ECG of 06-AUG-2017 12:15,


Premature atrial complexes are no longer Present





Impression


Assessment and Plan


This is a 78 yo m with a recent history of sigmoidectomy secondary to volvulus 

suffering from colonic distention and a possible mesenteric venous thrombosis. 





Abdominal pain secondary to colonic distention; s/p sigmoidectomy 


- currently NPO, if there is no improvement/ worsening of distention will place 

NG tube 


- Consult general surgery 


- continue home ranitidine once tolerating PO





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 


- Patient has recently had CT abd with contrast so will attempt to visualize 

flow of mesenteric arteries and this possible venous thrombosis with  US 

mesenteric 


-  continue to monitor for abdominal pain


- recheck CMP in the am





HTN


- Continue triamterene/ HCTZ  PO once able to tolerate PO 


- will have hydralazine prn for systolic > 180 





DMII 


 - patient has been of his medications for Dm


- will monitor the BSG and if the patient has consistently elevated BSG will 

add a sliding scale





Parkinson's 


- Continue Sinemet once tolerating PO





Anxiety


- typically uses clonazepam if using CPAP because of claustrophobia





Depression


- continue Zoloft once PO 





GIULIA


- daughter will bring CPAP from home 





Gout


continue allopurinol





DVT Prophylaxis 


- due to uncertainty for need for intervention will place on SCD








Attending Addendum:





I have physically seen and examined this patient, have supervised the medical 

residents activities, and agree with the H&P as noted above with the following 

exceptions as noted.





The patient denies chest pain, palpitations, cough, lower extremity swelling, 

vision change, hearing change, sore throat, fevers, chills, sweats,  blood in 

urine or stool, dysuria, urinary frequency or urgency, lightheadedness, 

dizziness, headache, memory loss, rash, abnormal bruising or bleeding, imbalance

, focal or generalized weakness, numbness or tingling in arms or legs, 

generalized arthralgias or myalgias, back or neck pain, night sweats, or 

allergy symptoms.


The review of systems is otherwise negative other than for that already noted 

above, and at least 10 systems have been reviewed.








The patient is awake, well-developed and adequately nourished, alert and 

oriented 3, normocephalic and atraumatic, lying in bed and in no acute 

distress.





HEENT--PERRL, EOMI, mucous membranes  and oropharynx dry.


Neck--supple, no JVD or bruits, thyroid normal, trachea midline, no adenopathy.


Heart--normal S1 and S2, no extra beats, no murmurs, rubs or gallops.


Lungs--clear bilaterally with good air movement, no respiratory distress, no 

accessory muscle use.


Abdomen--normal bowel sounds and soft, mild tenderness and mildly distended, no 

hernias or masses,  no organomegaly.


Extremities--no cyanosis, clubbing or edema. There are good distal pulses b/l.


Dermatologic--normal skin turgor, normal color, warm and dry, no abnormal lymph 

nodes, no rash.


Neurologic--cranial nerves II through XII grossly intact, motor and sensory 

examination normal.


Rheumatologic--normal range of motion, nontender, muscles and joints.


Psychiatric--normal affect.





Assessment and Plan:





1.  Abdominal pain/colonic distention/status post sigmoidectomy during 

admission from 7/27--8/10.  Patient be admitted to the medical surgical floor 

for observation.  Consult Dr. Kent from general surgery who performed the 

surgery.


Nothing by mouth except medications.





2.  Possible mesenteric vein thrombosis--brought to question during CT of 

abdomen and pelvis with contrast.


Unable to perform CTA due to previous dye, we'll therefore order mesenteric 

venous Dopplers for now, may need CTA later.


Patient does have a history of DVT and PE has been off anticoagulation. 


Order lower extremity venous Dopplers.





3.  Hypertension--hold triamterene/HCTZ.


Place on hydralazine 10 mg IV every 4 hours when necessary systolic blood 

pressure greater than 180.





4.  Obstructive sleep apnea--patient will use CPAP from home.








Level of Care


Med/Surg





Advanced Directives


Existing Advance Directive:  No


Existing Living Will:  No


Existing Power of :  Yes





VTE Prophylaxis


VTE Risk Assessment Done? Y/N:  Yes


Risk Level:  Moderate


Given or contraindicated:  SCD's





Social Service Consult


None Apply





Note


Total Time:   Critical Care 30 - 74 minutes





Additional Copies To


Johnnie Reis M.D.

## 2017-08-14 VITALS
OXYGEN SATURATION: 97 % | HEART RATE: 66 BPM | TEMPERATURE: 98.06 F | SYSTOLIC BLOOD PRESSURE: 143 MMHG | DIASTOLIC BLOOD PRESSURE: 72 MMHG

## 2017-08-14 VITALS
SYSTOLIC BLOOD PRESSURE: 147 MMHG | HEART RATE: 67 BPM | DIASTOLIC BLOOD PRESSURE: 71 MMHG | OXYGEN SATURATION: 96 % | TEMPERATURE: 98.42 F

## 2017-08-14 VITALS
SYSTOLIC BLOOD PRESSURE: 168 MMHG | HEART RATE: 76 BPM | TEMPERATURE: 98.24 F | OXYGEN SATURATION: 93 % | DIASTOLIC BLOOD PRESSURE: 80 MMHG

## 2017-08-14 VITALS — DIASTOLIC BLOOD PRESSURE: 82 MMHG | SYSTOLIC BLOOD PRESSURE: 165 MMHG | HEART RATE: 75 BPM

## 2017-08-14 LAB
ALBUMIN/GLOB SERPL: 0.7 {RATIO} (ref 0.9–2)
ALP SERPL-CCNC: 88 U/L (ref 45–117)
ALT SERPL-CCNC: 11 U/L (ref 12–78)
AMYLASE SERPL-CCNC: 45 U/L (ref 25–115)
ANION GAP SERPL CALC-SCNC: 5 MMOL/L (ref 3–11)
AST SERPL-CCNC: 14 U/L (ref 15–37)
BASOPHILS # BLD: 0.01 K/UL (ref 0–0.2)
BASOPHILS NFR BLD: 0.1 %
BUN SERPL-MCNC: 12 MG/DL (ref 7–18)
BUN/CREAT SERPL: 12.1 (ref 10–20)
CALCIUM SERPL-MCNC: 8.6 MG/DL (ref 8.5–10.1)
CHLORIDE SERPL-SCNC: 104 MMOL/L (ref 98–107)
CO2 SERPL-SCNC: 28 MMOL/L (ref 21–32)
COMPLETE: YES
CREAT CL PREDICTED SERPL C-G-VRATE: 78.7 ML/MIN
CREAT SERPL-MCNC: 1 MG/DL (ref 0.6–1.4)
EOSINOPHIL NFR BLD AUTO: 394 K/UL (ref 130–400)
GLOBULIN SER-MCNC: 3.9 GM/DL (ref 2.5–4)
GLUCOSE SERPL-MCNC: 99 MG/DL (ref 70–99)
HCT VFR BLD CALC: 34.1 % (ref 42–52)
IG%: 0.6 %
IMM GRANULOCYTES NFR BLD AUTO: 14 %
LYMPHOCYTES # BLD: 1.38 K/UL (ref 1.2–3.4)
MCH RBC QN AUTO: 30.3 PG (ref 25–34)
MCHC RBC AUTO-ENTMCNC: 31.7 G/DL (ref 32–36)
MCV RBC AUTO: 95.5 FL (ref 80–100)
MONOCYTES NFR BLD: 17 %
NEUTROPHILS # BLD AUTO: 1.5 %
NEUTROPHILS NFR BLD AUTO: 66.8 %
PMV BLD AUTO: 10 FL (ref 7.4–10.4)
POTASSIUM SERPL-SCNC: 3.6 MMOL/L (ref 3.5–5.1)
RBC # BLD AUTO: 3.57 M/UL (ref 4.7–6.1)
SODIUM SERPL-SCNC: 137 MMOL/L (ref 136–145)
WBC # BLD AUTO: 9.85 K/UL (ref 4.8–10.8)

## 2017-08-14 RX ADMIN — ALUMINUM HYDROXIDE AND MAGNESIUM HYDROXIDE STA ML: 200; 200 SUSPENSION ORAL at 15:54

## 2017-08-14 RX ADMIN — MAXZIDE SCH TAB: 37.5; 25 TABLET ORAL at 12:31

## 2017-08-14 RX ADMIN — Medication SCH MG: at 09:00

## 2017-08-14 RX ADMIN — CARBIDOPA AND LEVODOPA SCH TAB: 25; 100 TABLET ORAL at 09:00

## 2017-08-14 RX ADMIN — Medication SCH MCG: at 12:26

## 2017-08-14 RX ADMIN — CARBIDOPA AND LEVODOPA SCH TAB: 25; 100 TABLET ORAL at 12:27

## 2017-08-14 RX ADMIN — CARBIDOPA AND LEVODOPA SCH TAB: 25; 100 TABLET ORAL at 20:41

## 2017-08-14 RX ADMIN — SODIUM CHLORIDE SCH MLS/HR: 900 INJECTION, SOLUTION INTRAVENOUS at 20:42

## 2017-08-14 RX ADMIN — Medication SCH INTER.UNIT: at 12:25

## 2017-08-14 RX ADMIN — SERTRALINE HYDROCHLORIDE SCH MG: 50 TABLET, FILM COATED ORAL at 12:29

## 2017-08-14 RX ADMIN — SODIUM CHLORIDE SCH MLS/HR: 900 INJECTION, SOLUTION INTRAVENOUS at 05:44

## 2017-08-14 RX ADMIN — Medication SCH INTER.UNIT: at 09:00

## 2017-08-14 RX ADMIN — RANITIDINE SCH MG: 150 TABLET ORAL at 12:30

## 2017-08-14 RX ADMIN — ALLOPURINOL SCH MG: 300 TABLET ORAL at 12:31

## 2017-08-14 RX ADMIN — GABAPENTIN SCH MG: 300 CAPSULE ORAL at 20:40

## 2017-08-14 RX ADMIN — GABAPENTIN SCH MG: 300 CAPSULE ORAL at 12:30

## 2017-08-14 RX ADMIN — RANITIDINE SCH MG: 150 TABLET ORAL at 09:00

## 2017-08-14 RX ADMIN — ALUMINUM HYDROXIDE AND MAGNESIUM HYDROXIDE STA ML: 200; 200 SUSPENSION ORAL at 17:25

## 2017-08-14 RX ADMIN — RANITIDINE SCH MG: 150 TABLET ORAL at 20:40

## 2017-08-14 RX ADMIN — GABAPENTIN SCH MG: 300 CAPSULE ORAL at 17:00

## 2017-08-14 RX ADMIN — GABAPENTIN SCH MG: 300 CAPSULE ORAL at 09:00

## 2017-08-14 RX ADMIN — Medication SCH MG: at 12:31

## 2017-08-14 RX ADMIN — SERTRALINE HYDROCHLORIDE SCH MG: 50 TABLET, FILM COATED ORAL at 09:00

## 2017-08-14 RX ADMIN — GABAPENTIN SCH MG: 300 CAPSULE ORAL at 13:00

## 2017-08-14 RX ADMIN — ALLOPURINOL SCH MG: 300 TABLET ORAL at 09:00

## 2017-08-14 RX ADMIN — MAXZIDE SCH TAB: 37.5; 25 TABLET ORAL at 09:00

## 2017-08-14 RX ADMIN — CARBIDOPA AND LEVODOPA SCH TAB: 25; 100 TABLET ORAL at 14:00

## 2017-08-14 RX ADMIN — Medication SCH MCG: at 09:00

## 2017-08-14 RX ADMIN — SODIUM CHLORIDE SCH MLS/HR: 900 INJECTION, SOLUTION INTRAVENOUS at 13:36

## 2017-08-14 NOTE — DIAGNOSTIC IMAGING REPORT
[facility]



HISTORY:      patient was vomiting and want to rule out bowel obstruction



COMPARISON: Abdomen and pelvis CT 8/13/2017.



FINDINGS: Moderate to severe dilatation of the gas-filled colon. This is similar

to the prior CT examination. There are few mildly dilated gas-filled loops of

small bowel within the abdomen. This is also not significant changed. There are

midline skin staples. The colon measures up to 9.5 cm in diameter.  No renal

calculi. No ureteral calculi. No pneumoperitoneum or pneumatosis.



IMPRESSION:  

No change in the dilated gas-filled loops of large and small bowel. This favors

a postoperative ileus given the recent postoperative changes.







Electronically signed by:  Wale Sultana M.D.

8/14/2017 4:37 PM



Dictated Date/Time:  8/14/2017 4:34 PM

## 2017-08-14 NOTE — SURGERY CONSULTATION
Consultation


Date of Consultation:


Aug 14, 2017.


Attending Physician:


Ciro Rose MD, PhD


History of Present Illness


pt s/p recent sigmoidectomy for recurrent volvulus.  Was d/c'd several days 

ago.  per daughter, pt had no pain, fever, etc...but felt distended and 

"uncomfortable"....





Past Medical/Surgical History


Medical Problems:


(1) Abdominal distension


Status: Acute  





(2) Rhabdomyolysis


Status: Acute  





(3) Sigmoid volvulus


Status: Acute  





(4) Vomiting


Status: Acute  





(5) Weakness


Status: Acute  





Social History Problems:


(1) Status post partial colectomy


Status: Acute  











Family History





Diabetes mellitus


FH: breast cancer





Social History


Smoking Status:  Former Smoker


Smokeless Tobacco Use:  No


Alcohol Use:  none


Drug Use:  none


Marital Status:  , 


Housing Status:  lives with significant other


Occupation Status:  retired





Allergies


Coded Allergies:  


     No Known Allergies (Verified , 2/21/17)





Home Medications


Scheduled


Allopurinol (Zyloprim), 300 MG PO DAILY


Aspirin (Aspirin Ec), 81 MG PO DAILY


Carbidopa/Levodopa (Sinemet 25MG/100MG), 1.5 TAB PO TID


Cholecalciferol (Vitamin D3), 2,000 UNITS PO DAILY


Cyanocobalamin (Vitamin B12), 1,000 MCG PO DAILY


Gabapentin (Neurontin), 300 MG PO QID


Ranitidine (Zantac), 150 MG PO BID


Sertraline (Zoloft), 1 TAB PO DAILY


Triamterene/Hctz (Triamterene/Hctz 37.5-25MG), 0.5 TAB PO DAILY





Scheduled PRN


Clonazepam (Klonopin), 0.25-0.5 MG PO HS PRN for Sleep


Miconazole Nitrate (Desenex Shake Powder), 1 APPLN EXT BID PRN for Affected 

Skin Folds


Ondansetron Hcl (Zofran), 4 MG PO q6hrs PRN for Nausea


Polyethylene Glycol 3350 (Bulk (Polyethylene Glycol 3350), 17 GM PO DAILY PRN 

for Constipation





Current Inpatient Medications





Current Inpatient Medications








 Medications


  (Trade)  Dose


 Ordered  Sig/Kalpesh


 Route  Start Time


 Stop Time Status Last Admin


Dose Admin


 


 Ioversol


  (Optiray 320)  100 ml  UD  PRN


 IV  8/13/17 18:00


 8/17/17 17:59   


 


 


 Miscellaneous


  (Iv Fluids


 Completed)  1 ea  PRN  PRN


 N/A  8/13/17 21:30


 8/13/18 21:29   


 


 


 Sodium Chloride  1,000 ml @ 


 125 mls/hr  Q8H


 IV  8/13/17 23:00


 9/12/17 22:59  8/14/17 05:44


125 MLS/HR


 


 Allopurinol


  (Zyloprim Tab)  300 mg  DAILY


 PO  8/14/17 09:00


 9/13/17 08:59   


 


 


 Aspirin


  (Ecotrin Tab)  81 mg  DAILY


 PO  8/14/17 09:00


 9/13/17 08:59   


 


 


 Carbidopa/Levodopa


  (Sinemet 25/


 100MG Tab)  1.5 tab  TID


 PO  8/14/17 09:00


 9/13/17 08:59   


 


 


 Clonazepam


  (Klonopin Tab)  0.25 mg  HS  PRN


 PO  8/13/17 22:45


 9/12/17 22:44   


 


 


 Gabapentin


  (Neurontin Cap)  300 mg  QID


 PO  8/14/17 09:00


 9/13/17 08:59   


 


 


 Miconazole Nitrate


  (Desenex Powder)  1 appln  BID  PRN


 EXT  8/13/17 22:45


 9/12/17 22:44   


 


 


 Ondansetron HCl


  (Zofran Tab)  4 mg  Q8H  PRN


 PO  8/13/17 22:45


 9/12/17 22:44   


 


 


 Ranitidine HCl


  (zANTac TAB)  150 mg  BID


 PO  8/14/17 09:00


 9/13/17 08:59   


 


 


 Sertraline HCl


  (Zoloft Tab)  25 mg  DAILY


 PO  8/14/17 09:00


 9/13/17 08:59   


 


 


 Triamterene/HCTZ


  (Maxzide 37.5/25


 Tab)  0.5 tab  DAILY


 PO  8/14/17 09:00


 9/13/17 08:59   


 


 


 Cholecalciferol


  (Vitamin D Tab)  2,000


 inter.unit  DAILY


 PO  8/14/17 09:00


 9/13/17 08:59   


 


 


 Cyanocobalamin


  (Vitamin B-12


 Tab)  1,000 mcg  DAILY


 PO  8/14/17 09:00


 9/13/17 08:59   


 


 


 Polyethylene


  (Miralax Powder


 Packet)  17 gm  DAILY  PRN


 PO  8/13/17 22:45


 9/12/17 22:44   


 


 


 Hydralazine HCl


  (HydrALAZINE INJ)  10 mg  Q6H  PRN


 IV.  8/13/17 23:30


 9/12/17 23:29   


 











Review of Systems


Abdomen:  + problem reported (distension;decreased PO intake)





Physical Exam











  Date Time  Temp Pulse Resp B/P (MAP) Pulse Ox O2 Delivery O2 Flow Rate FiO2


 


8/14/17 07:06 36.9 67 15 147/71 (96) 96 Room Air  


 


8/13/17 23:48      Room Air  


 


8/13/17 23:01 36.7 71 16 132/67 (88) 97 Room Air  


 


8/13/17 21:30 36.6 73 20 173/78 (109) 99 Room Air  


 


8/13/17 21:13  70 20 151/70 98 Room Air  


 


8/13/17 20:57 36.8 74 20 127/74 99 Room Air  


 


8/13/17 20:01  74 20 127/74 96 Room Air  


 


8/13/17 18:22  71 22 135/71 96 Room Air  


 


8/13/17 17:24  77      


 


8/13/17 16:45 36.8 84 16 129/67 93 Room Air  








General Appearance:  + pertinent finding (unable to exam as pt is in radiology.

  history per daughter. )





Laboratory Results





Last 24 Hours








Test


  8/13/17


17:10 8/13/17


20:55 8/14/17


05:11 8/14/17


08:32


 


White Blood Count 12.42 K/uL   9.85 K/uL  


 


Red Blood Count 3.99 M/uL   3.57 M/uL  


 


Hemoglobin 12.0 g/dL   10.8 g/dL  


 


Hematocrit 37.7 %   34.1 %  


 


Mean Corpuscular Volume 94.5 fL   95.5 fL  


 


Mean Corpuscular Hemoglobin 30.1 pg   30.3 pg  


 


Mean Corpuscular Hemoglobin


Concent 31.8 g/dl 


  


  31.7 g/dl 


  


 


 


Platelet Count 455 K/uL   394 K/uL  


 


Mean Platelet Volume 10.0 fL   10.0 fL  


 


Neutrophils (%) (Auto) 77.9 %   66.8 %  


 


Lymphocytes (%) (Auto) 9.3 %   14.0 %  


 


Monocytes (%) (Auto) 11.8 %   17.0 %  


 


Eosinophils (%) (Auto) 0.3 %   1.5 %  


 


Basophils (%) (Auto) 0.1 %   0.1 %  


 


Neutrophils # (Auto) 9.68 K/uL   6.58 K/uL  


 


Lymphocytes # (Auto) 1.15 K/uL   1.38 K/uL  


 


Monocytes # (Auto) 1.47 K/uL   1.67 K/uL  


 


Eosinophils # (Auto) 0.04 K/uL   0.15 K/uL  


 


Basophils # (Auto) 0.01 K/uL   0.01 K/uL  


 


RDW Standard Deviation 48.1 fL   49.4 fL  


 


RDW Coefficient of Variation 14.0 %   14.2 %  


 


Immature Granulocyte % (Auto) 0.6 %   0.6 %  


 


Immature Granulocyte # (Auto) 0.07 K/uL   0.06 K/uL  


 


Red Blood Cell Morphology Unremarkable    


 


Prothrombin Time 11.7 SECONDS    


 


Prothromb Time International


Ratio 1.1 


  


  


  


 


 


Activated Partial


Thromboplast Time 25.4 SECONDS 


  


  


  


 


 


Partial Thromboplastin Ratio 1.0    


 


Sodium Level 135 mmol/L   137 mmol/L  


 


Potassium Level 4.0 mmol/L   3.6 mmol/L  


 


Chloride Level 102 mmol/L   104 mmol/L  


 


Carbon Dioxide Level 27 mmol/L   28 mmol/L  


 


Anion Gap 6.0 mmol/L   5.0 mmol/L  


 


Blood Urea Nitrogen 16 mg/dl   12 mg/dl  


 


Creatinine 1.10 mg/dl   1.00 mg/dl  


 


Est Creatinine Clear Calc


Drug Dose 71.6 ml/min 


  


  78.7 ml/min 


  


 


 


Estimated GFR (


American) 75.2 


  


  84.4 


  


 


 


Estimated GFR (Non-


American 64.9 


  


  72.8 


  


 


 


BUN/Creatinine Ratio 14.4   12.1  


 


Random Glucose 153 mg/dl   99 mg/dl  


 


Calcium Level 9.0 mg/dl   8.6 mg/dl  


 


Total Bilirubin 0.7 mg/dl   0.7 mg/dl  


 


Aspartate Amino Transf


(AST/SGOT) 17 U/L 


  


  14 U/L 


  


 


 


Alanine Aminotransferase


(ALT/SGPT) 10 U/L 


  


  11 U/L 


  


 


 


Alkaline Phosphatase 103 U/L   88 U/L  


 


Troponin I < 0.015 ng/ml    


 


Total Protein 7.7 gm/dl   6.7 gm/dl  


 


Albumin 3.2 gm/dl   2.8 gm/dl  


 


Globulin 4.5 gm/dl   3.9 gm/dl  


 


Albumin/Globulin Ratio 0.7   0.7  


 


Lipase 418 U/L    


 


Urine Color  YELLOW   


 


Urine Appearance  CLEAR   


 


Urine pH  6.0   


 


Urine Specific Gravity  1.041   


 


Urine Protein  NEG   


 


Urine Glucose (UA)  NEG   


 


Urine Ketones  NEG   


 


Urine Occult Blood  NEG   


 


Urine Nitrite  NEG   


 


Urine Bilirubin  NEG   


 


Urine Urobilinogen  NEG   


 


Urine Leukocyte Esterase  NEG   











Assessment & Plan


A: Ct showing 1.large bowel dilation ( had at time of surgery as well... likely 

institutional bowel). 2. fluid collection ( suspect hematoma). 3. mesenteric 

thrombus 


      agree with [facility] Ultrasound. pt with hx of DVT.  Can consider antibiotics 

though clinically no overt signs of infection ( WBC down since admission).  I 

ordered a lactic acid level this AM.





B.  elevated lipase.  ? etiology of abdominal discomfort/bloating.  Will 

recheck lipase and amylase today





Would primarily proceed with conservative tx.  no indication for surgical 

intervention at this time. will follow along .

## 2017-08-14 NOTE — DIAGNOSTIC IMAGING REPORT
DUPLEX MESENTERIC



CLINICAL HISTORY: r/o mesenteric thrombus abnormal CT exam



TECHNIQUE: Doppler



COMPARISON STUDY:  CT dated 8/13/2017



FINDINGS: Near nondiagnostic exam due to overlying bowel content. Mesenteric

venous vasculature could not be identified.



The portal veins as well as hepatic veins are patent. Splenic vein is patent.



IMPRESSION:  Nondiagnostic evaluation of the mesenteric venous vasculature due

to overlying bowel content. Unremarkable venous flow characteristics of the

upper abdomen. 









The above report was generated using voice recognition software.  It may contain

grammatical, syntax or spelling errors.







Electronically signed by:  Gerald Carrillo M.D.

8/14/2017 9:40 AM



Dictated Date/Time:  8/14/2017 9:38 AM

## 2017-08-14 NOTE — PROGRESS NOTE
Subjective


Date of Service:


Aug 14, 2017.


Subjective


Pt evaluation today including:  conversation w/ patient, conversation w/ family

, physical exam, chart review, lab review, review of studies, conversation w/ 

consultant, review of inpatient medication list


feeling ok, no c/o





no n/v, no abd pain, 


but also reported not passing gas or BM when inquiry





Problem List


Medical Problems:


(1) Abdominal distension


Status: Acute  





(2) Rhabdomyolysis


Status: Acute  





(3) Sigmoid volvulus


Status: Acute  





(4) Vomiting


Status: Acute  





(5) Weakness


Status: Acute  





Social History Problems:


(1) Status post partial colectomy


Status: Acute  








Review of Systems


Constitutional:  No fever, No chills, No sweats, No weight loss, No weakness, 

No fatigue, No problem reported


Eyes:  No worsening of vision, No eye pain, No redness, No discharge, No 

diplopia


ENT:  No hearing loss, No unusual epistaxis, No nasal symptoms, No sore throat, 

No tinnitus, No dental problems, No trouble swallowing


Respiratory:  No cough, No sputum, No wheezing, No shortness of breath, No 

dyspnea on exertion, No dyspnea at rest, No hemoptysis


Cardiac:  No chest pain, No orthopnea, No PND, No edema, No claudication, No 

palpitations


Abdomen:  No pain, No nausea, No vomiting, No diarrhea, No constipation


Musculoskeletal:  No joint pain, No muscle pain, No swelling, No calf pain


Male :  No dysuria, No urinary frequency, No incontinence, No nocturia more 

than once/night, No slowing stream, No hematuria


Neurologic:  No memory loss, No paralysis, No weakness, No numbness/tingling, 

No vertigo, No balance problems


Psychiatric:  No depression symptoms, No anhedonism, No anxiety, No insomnia, 

No substance abuse


Heme:  No abnormal bleeding/bruising, No clotting problems, No swollen lymph 

nodes, No night sweats


Endo:  No fatigue, No excessive thirst, No excessive urination


Skin:  No rash, No itch, No new/changing skin lesions, No color change, No 

bleeding





Objective


Vital Signs











  Date Time  Temp Pulse Resp B/P (MAP) Pulse Ox O2 Delivery O2 Flow Rate FiO2


 


8/14/17 07:06 36.9 67 15 147/71 (96) 96 Room Air  


 


8/13/17 23:48      Room Air  


 


8/13/17 23:01 36.7 71 16 132/67 (88) 97 Room Air  


 


8/13/17 21:30 36.6 73 20 173/78 (109) 99 Room Air  


 


8/13/17 21:13  70 20 151/70 98 Room Air  


 


8/13/17 20:57 36.8 74 20 127/74 99 Room Air  


 


8/13/17 20:01  74 20 127/74 96 Room Air  


 


8/13/17 18:22  71 22 135/71 96 Room Air  


 


8/13/17 17:24  77      


 


8/13/17 16:45 36.8 84 16 129/67 93 Room Air  











Physical Exam


General Appearance:  WD/WN, no apparent distress, + pertinent finding (looks 

older than age)


Eyes:  normal inspection, PERRL, EOMI, sclerae normal


ENT:  normal ENT inspection, hearing grossly normal, pharynx normal


Neck:  supple, no adenopathy, thyroid normal, no JVD, no carotid bruits, 

trachea midline


Respiratory/Chest:  chest non-tender, lungs clear, normal breath sounds, no 

respiratory distress, no accessory muscle use


Cardiovascular:  regular rate, rhythm, no edema, no gallop, no JVD, no murmur


Abdomen:  non tender, soft, no organomegaly, no pulsatile mass, + pertinent 

finding (decreased BS)


Extremities:  normal range of motion, non-tender, normal inspection, no pedal 

edema, no calf tenderness, normal capillary refill, pelvis stable


Neurologic/Psychiatric:  CNs II-XII nml as tested, no motor/sensory deficits, 

alert, normal mood/affect, oriented x 3


Skin:  normal color, warm/dry, no rash


Lymphatic:  no adenopathy





Laboratory Results





Last 24 Hours








Test


  8/13/17


17:10 8/13/17


20:55 8/14/17


05:11 8/14/17


10:23


 


White Blood Count 12.42 K/uL   9.85 K/uL  


 


Red Blood Count 3.99 M/uL   3.57 M/uL  


 


Hemoglobin 12.0 g/dL   10.8 g/dL  


 


Hematocrit 37.7 %   34.1 %  


 


Mean Corpuscular Volume 94.5 fL   95.5 fL  


 


Mean Corpuscular Hemoglobin 30.1 pg   30.3 pg  


 


Mean Corpuscular Hemoglobin


Concent 31.8 g/dl 


  


  31.7 g/dl 


  


 


 


Platelet Count 455 K/uL   394 K/uL  


 


Mean Platelet Volume 10.0 fL   10.0 fL  


 


Neutrophils (%) (Auto) 77.9 %   66.8 %  


 


Lymphocytes (%) (Auto) 9.3 %   14.0 %  


 


Monocytes (%) (Auto) 11.8 %   17.0 %  


 


Eosinophils (%) (Auto) 0.3 %   1.5 %  


 


Basophils (%) (Auto) 0.1 %   0.1 %  


 


Neutrophils # (Auto) 9.68 K/uL   6.58 K/uL  


 


Lymphocytes # (Auto) 1.15 K/uL   1.38 K/uL  


 


Monocytes # (Auto) 1.47 K/uL   1.67 K/uL  


 


Eosinophils # (Auto) 0.04 K/uL   0.15 K/uL  


 


Basophils # (Auto) 0.01 K/uL   0.01 K/uL  


 


RDW Standard Deviation 48.1 fL   49.4 fL  


 


RDW Coefficient of Variation 14.0 %   14.2 %  


 


Immature Granulocyte % (Auto) 0.6 %   0.6 %  


 


Immature Granulocyte # (Auto) 0.07 K/uL   0.06 K/uL  


 


Red Blood Cell Morphology Unremarkable    


 


Prothrombin Time 11.7 SECONDS    


 


Prothromb Time International


Ratio 1.1 


  


  


  


 


 


Activated Partial


Thromboplast Time 25.4 SECONDS 


  


  


  


 


 


Partial Thromboplastin Ratio 1.0    


 


Sodium Level 135 mmol/L   137 mmol/L  


 


Potassium Level 4.0 mmol/L   3.6 mmol/L  


 


Chloride Level 102 mmol/L   104 mmol/L  


 


Carbon Dioxide Level 27 mmol/L   28 mmol/L  


 


Anion Gap 6.0 mmol/L   5.0 mmol/L  


 


Blood Urea Nitrogen 16 mg/dl   12 mg/dl  


 


Creatinine 1.10 mg/dl   1.00 mg/dl  


 


Est Creatinine Clear Calc


Drug Dose 71.6 ml/min 


  


  78.7 ml/min 


  


 


 


Estimated GFR (


American) 75.2 


  


  84.4 


  


 


 


Estimated GFR (Non-


American 64.9 


  


  72.8 


  


 


 


BUN/Creatinine Ratio 14.4   12.1  


 


Random Glucose 153 mg/dl   99 mg/dl  


 


Calcium Level 9.0 mg/dl   8.6 mg/dl  


 


Total Bilirubin 0.7 mg/dl   0.7 mg/dl  


 


Aspartate Amino Transf


(AST/SGOT) 17 U/L 


  


  14 U/L 


  


 


 


Alanine Aminotransferase


(ALT/SGPT) 10 U/L 


  


  11 U/L 


  


 


 


Alkaline Phosphatase 103 U/L   88 U/L  


 


Troponin I < 0.015 ng/ml    


 


Total Protein 7.7 gm/dl   6.7 gm/dl  


 


Albumin 3.2 gm/dl   2.8 gm/dl  


 


Globulin 4.5 gm/dl   3.9 gm/dl  


 


Albumin/Globulin Ratio 0.7   0.7  


 


Lipase 418 U/L    229 U/L 


 


Urine Color  YELLOW   


 


Urine Appearance  CLEAR   


 


Urine pH  6.0   


 


Urine Specific Gravity  1.041   


 


Urine Protein  NEG   


 


Urine Glucose (UA)  NEG   


 


Urine Ketones  NEG   


 


Urine Occult Blood  NEG   


 


Urine Nitrite  NEG   


 


Urine Bilirubin  NEG   


 


Urine Urobilinogen  NEG   


 


Urine Leukocyte Esterase  NEG   


 


Lactic Acid Level    1.1 mmol/L 


 


Amylase Level    45 U/L 











Assessment and Plan


78 yo m with a recent history of sigmoidectomy secondary to volvulus suffering 

from colonic distention and a possible mesenteric venous thrombosis admitted on 

8/13/2017





Abdominal pain secondary to colonic distention; s/p sigmoidectomy


stable


recheck lipase, has down, lactic acid 1.1, mild leukocytosis


d/w surgeon, abd cT showed bowel distention is not new, head not feel has 

ischemic events going on


surgeon recs clear liquid diet


continue home ranitidine 





Possible mesenteric venous thrombosis , seem very less likely per surgeon


- Patient has recently had CT abd with contrast so will attempt to visualize 

flow of mesenteric arteries and this possible venous thrombosis with  US 

mesenteric 


-  no more abdominal pain, mesenteric duplex  US this am is not conclusive,  

reported: "Nondiagnostic evaluation of the mesenteric venous vasculature due


to overlying bowel content. Unremarkable venous flow characteristics of the 

upper abdomen"





HTN


- Continue triamterene/ HCTZ  PO 


- will have hydralazine prn for systolic > 180 





DMII 


 - patient has been of his medications for Dm


- will monitor the BSG and if the patient has consistently elevated BSG


- continue sliding scale, check a1c





Parkinson's 


- Continue Sinemet once tolerating PO





Anxiety


- typically uses clonazepam if using CPAP because of claustrophobia





Depression


- continue Zoloft once PO 





GIULIA


- daughter will bring CPAP from home 





Gout


continue allopurinol





DVT Prophylaxis 


- due to uncertainty for need for intervention will place on SCD,








d/w pt and son of care plan, answer all questions





full code, but dose not want to prolonged life support


Continued [facility] stay due to:  multiple IV medications needed


Discharge planning:  uncertain

## 2017-08-15 VITALS
SYSTOLIC BLOOD PRESSURE: 164 MMHG | TEMPERATURE: 97.7 F | HEART RATE: 66 BPM | OXYGEN SATURATION: 93 % | DIASTOLIC BLOOD PRESSURE: 73 MMHG

## 2017-08-15 VITALS
OXYGEN SATURATION: 97 % | TEMPERATURE: 98.42 F | HEART RATE: 56 BPM | SYSTOLIC BLOOD PRESSURE: 114 MMHG | DIASTOLIC BLOOD PRESSURE: 64 MMHG

## 2017-08-15 VITALS — HEART RATE: 66 BPM | DIASTOLIC BLOOD PRESSURE: 78 MMHG | SYSTOLIC BLOOD PRESSURE: 171 MMHG

## 2017-08-15 VITALS
OXYGEN SATURATION: 96 % | TEMPERATURE: 97.88 F | SYSTOLIC BLOOD PRESSURE: 146 MMHG | DIASTOLIC BLOOD PRESSURE: 71 MMHG | HEART RATE: 56 BPM

## 2017-08-15 VITALS — HEART RATE: 58 BPM | DIASTOLIC BLOOD PRESSURE: 68 MMHG | SYSTOLIC BLOOD PRESSURE: 133 MMHG

## 2017-08-15 LAB
ALP SERPL-CCNC: 82 U/L (ref 45–117)
ALT SERPL-CCNC: 11 U/L (ref 12–78)
ANION GAP SERPL CALC-SCNC: 6 MMOL/L (ref 3–11)
AST SERPL-CCNC: 14 U/L (ref 15–37)
BASOPHILS # BLD: 0.01 K/UL (ref 0–0.2)
BASOPHILS NFR BLD: 0.1 %
BUN SERPL-MCNC: 10 MG/DL (ref 7–18)
BUN/CREAT SERPL: 12 (ref 10–20)
CALCIUM SERPL-MCNC: 8.2 MG/DL (ref 8.5–10.1)
CHLORIDE SERPL-SCNC: 105 MMOL/L (ref 98–107)
CO2 SERPL-SCNC: 26 MMOL/L (ref 21–32)
COMPLETE: YES
CREAT CL PREDICTED SERPL C-G-VRATE: 93.7 ML/MIN
CREAT SERPL-MCNC: 0.84 MG/DL (ref 0.6–1.4)
EOSINOPHIL NFR BLD AUTO: 375 K/UL (ref 130–400)
EST. AVERAGE GLUCOSE BLD GHB EST-MCNC: 111 MG/DL
GLUCOSE SERPL-MCNC: 91 MG/DL (ref 70–99)
HCT VFR BLD CALC: 32.3 % (ref 42–52)
IG%: 0.5 %
IMM GRANULOCYTES NFR BLD AUTO: 21 %
LYMPHOCYTES # BLD: 1.61 K/UL (ref 1.2–3.4)
MAGNESIUM SERPL-MCNC: 1.6 MG/DL (ref 1.8–2.4)
MCH RBC QN AUTO: 30 PG (ref 25–34)
MCHC RBC AUTO-ENTMCNC: 31.6 G/DL (ref 32–36)
MCV RBC AUTO: 95 FL (ref 80–100)
MONOCYTES NFR BLD: 16.7 %
NEUTROPHILS # BLD AUTO: 2.5 %
NEUTROPHILS NFR BLD AUTO: 59.2 %
PMV BLD AUTO: 9.9 FL (ref 7.4–10.4)
POTASSIUM SERPL-SCNC: 3.5 MMOL/L (ref 3.5–5.1)
RBC # BLD AUTO: 3.4 M/UL (ref 4.7–6.1)
SODIUM SERPL-SCNC: 137 MMOL/L (ref 136–145)
WBC # BLD AUTO: 7.67 K/UL (ref 4.8–10.8)

## 2017-08-15 RX ADMIN — GABAPENTIN SCH MG: 300 CAPSULE ORAL at 16:52

## 2017-08-15 RX ADMIN — MAXZIDE SCH TAB: 37.5; 25 TABLET ORAL at 09:05

## 2017-08-15 RX ADMIN — GABAPENTIN SCH MG: 300 CAPSULE ORAL at 09:06

## 2017-08-15 RX ADMIN — MICONAZOLE NITRATE PRN APPLN: 20 POWDER TOPICAL at 14:34

## 2017-08-15 RX ADMIN — GABAPENTIN SCH MG: 300 CAPSULE ORAL at 20:53

## 2017-08-15 RX ADMIN — CARBIDOPA AND LEVODOPA SCH TAB: 25; 100 TABLET ORAL at 13:44

## 2017-08-15 RX ADMIN — GABAPENTIN SCH MG: 300 CAPSULE ORAL at 13:44

## 2017-08-15 RX ADMIN — Medication SCH INTER.UNIT: at 09:03

## 2017-08-15 RX ADMIN — RANITIDINE SCH MG: 150 TABLET ORAL at 20:53

## 2017-08-15 RX ADMIN — CARBIDOPA AND LEVODOPA SCH TAB: 25; 100 TABLET ORAL at 09:03

## 2017-08-15 RX ADMIN — HEPARIN SODIUM SCH UNIT: 10000 INJECTION, SOLUTION INTRAVENOUS; SUBCUTANEOUS at 14:31

## 2017-08-15 RX ADMIN — SODIUM CHLORIDE SCH MLS/HR: 900 INJECTION, SOLUTION INTRAVENOUS at 04:28

## 2017-08-15 RX ADMIN — SERTRALINE HYDROCHLORIDE SCH MG: 50 TABLET, FILM COATED ORAL at 09:05

## 2017-08-15 RX ADMIN — CARBIDOPA AND LEVODOPA SCH TAB: 25; 100 TABLET ORAL at 20:54

## 2017-08-15 RX ADMIN — HEPARIN SODIUM SCH UNIT: 10000 INJECTION, SOLUTION INTRAVENOUS; SUBCUTANEOUS at 21:31

## 2017-08-15 RX ADMIN — ALLOPURINOL SCH MG: 300 TABLET ORAL at 09:06

## 2017-08-15 RX ADMIN — RANITIDINE SCH MG: 150 TABLET ORAL at 09:05

## 2017-08-15 RX ADMIN — Medication SCH MG: at 09:06

## 2017-08-15 RX ADMIN — Medication SCH MCG: at 09:04

## 2017-08-15 NOTE — PROGRESS NOTE
Subjective


Date of Service:


Aug 15, 2017.


Subjective


Pt evaluation today including:  conversation w/ patient, conversation w/ family

, physical exam, chart review, lab review, review of studies, conversation w/ 

consultant, review of inpatient medication list


Report feeling much better, tolerate diet, eating and drinking, has 2-3 bowel 

movement, large amount,  since yesterday afternoon,


No more nausea vomiting, no abdominal pain, daughter agree his color looks much 

better too





Problem List


Medical Problems:


(1) Abdominal distension


Status: Acute  





(2) Rhabdomyolysis


Status: Acute  





(3) Sigmoid volvulus


Status: Acute  





(4) Vomiting


Status: Acute  





(5) Weakness


Status: Acute  





Social History Problems:


(1) Status post partial colectomy


Status: Acute  








Review of Systems


Constitutional:  No fever, No chills, No sweats, No weight loss, No weakness, 

No fatigue, No problem reported


Eyes:  No worsening of vision, No eye pain, No redness, No discharge, No 

diplopia


ENT:  No hearing loss, No unusual epistaxis, No nasal symptoms, No sore throat, 

No tinnitus, No dental problems, No trouble swallowing


Respiratory:  No cough, No sputum, No wheezing, No shortness of breath, No 

dyspnea on exertion, No dyspnea at rest, No hemoptysis


Cardiac:  No chest pain, No orthopnea, No PND, No edema, No claudication, No 

palpitations


Abdomen:  No pain, No nausea, No vomiting, No diarrhea, No constipation


Musculoskeletal:  No joint pain, No muscle pain, No swelling, No calf pain


Male :  No dysuria, No urinary frequency, No incontinence, No nocturia more 

than once/night, No slowing stream, No hematuria


Neurologic:  No memory loss, No paralysis, No weakness, No numbness/tingling, 

No vertigo, No balance problems


Psychiatric:  No depression symptoms, No anhedonism, No anxiety, No insomnia, 

No substance abuse


Heme:  No abnormal bleeding/bruising, No clotting problems, No swollen lymph 

nodes, No night sweats


Endo:  No fatigue, No excessive thirst, No excessive urination


Skin:  No rash, No itch, No new/changing skin lesions, No color change, No 

bleeding





Objective


Vital Signs











  Date Time  Temp Pulse Resp B/P (MAP) Pulse Ox O2 Delivery O2 Flow Rate FiO2


 


8/15/17 09:04  66  171/78 (109)    


 


8/15/17 07:14 36.5 66 16 164/73 (103) 93 Room Air  


 


8/14/17 22:46 36.7 66 16 143/72 (95) 97 CPAP  


 


8/14/17 20:05      Room Air  


 


8/14/17 15:29 36.8 76 18 168/80 (109) 93 Room Air  


 


8/14/17 12:33  75  165/82 (109)    











Physical Exam


General Appearance:  WD/WN, no apparent distress, + obese


Eyes:  normal inspection, PERRL, EOMI, sclerae normal


ENT:  normal ENT inspection, hearing grossly normal, pharynx normal


Neck:  supple, no adenopathy, thyroid normal, no JVD, no carotid bruits, 

trachea midline


Respiratory/Chest:  chest non-tender, lungs clear, normal breath sounds, no 

respiratory distress, no accessory muscle use


Cardiovascular:  regular rate, rhythm, no edema, no gallop, no JVD, no murmur


Abdomen:  normal bowel sounds, non tender, soft, no organomegaly, no pulsatile 

mass, + distended (mild)


Extremities:  normal range of motion, non-tender, normal inspection, no pedal 

edema, no calf tenderness, normal capillary refill, pelvis stable


Neurologic/Psychiatric:  CNs II-XII nml as tested, no motor/sensory deficits, 

alert, normal mood/affect, oriented x 3


Skin:  normal color, warm/dry, no rash


Lymphatic:  no adenopathy





Laboratory Results





Last 24 Hours








Test


  8/14/17


20:58 8/15/17


05:01 8/15/17


07:52


 


Bedside Glucose 116 mg/dl   111 mg/dl 


 


White Blood Count  7.67 K/uL  


 


Red Blood Count  3.40 M/uL  


 


Hemoglobin  10.2 g/dL  


 


Hematocrit  32.3 %  


 


Mean Corpuscular Volume  95.0 fL  


 


Mean Corpuscular Hemoglobin  30.0 pg  


 


Mean Corpuscular Hemoglobin


Concent 


  31.6 g/dl 


  


 


 


Platelet Count  375 K/uL  


 


Mean Platelet Volume  9.9 fL  


 


Neutrophils (%) (Auto)  59.2 %  


 


Lymphocytes (%) (Auto)  21.0 %  


 


Monocytes (%) (Auto)  16.7 %  


 


Eosinophils (%) (Auto)  2.5 %  


 


Basophils (%) (Auto)  0.1 %  


 


Neutrophils # (Auto)  4.54 K/uL  


 


Lymphocytes # (Auto)  1.61 K/uL  


 


Monocytes # (Auto)  1.28 K/uL  


 


Eosinophils # (Auto)  0.19 K/uL  


 


Basophils # (Auto)  0.01 K/uL  


 


RDW Standard Deviation  48.6 fL  


 


RDW Coefficient of Variation  14.1 %  


 


Immature Granulocyte % (Auto)  0.5 %  


 


Immature Granulocyte # (Auto)  0.04 K/uL  


 


Sodium Level  137 mmol/L  


 


Potassium Level  3.5 mmol/L  


 


Chloride Level  105 mmol/L  


 


Carbon Dioxide Level  26 mmol/L  


 


Anion Gap  6.0 mmol/L  


 


Blood Urea Nitrogen  10 mg/dl  


 


Creatinine  0.84 mg/dl  


 


Est Creatinine Clear Calc


Drug Dose 


  93.7 ml/min 


  


 


 


Estimated GFR (


American) 


  98.6 


  


 


 


Estimated GFR (Non-


American 


  85.1 


  


 


 


BUN/Creatinine Ratio  12.0  


 


Random Glucose  91 mg/dl  


 


Calcium Level  8.2 mg/dl  


 


Magnesium Level  1.6 mg/dl  


 


Total Bilirubin  0.6 mg/dl  


 


Direct Bilirubin  0.2 mg/dl  


 


Aspartate Amino Transf


(AST/SGOT) 


  14 U/L 


  


 


 


Alanine Aminotransferase


(ALT/SGPT) 


  11 U/L 


  


 


 


Alkaline Phosphatase  82 U/L  


 


Total Protein  6.2 gm/dl  


 


Albumin  2.5 gm/dl  











Assessment and Plan


76 yo m with a recent history of sigmoidectomy secondary to volvulus suffering 

from colonic distention and a possible mesenteric venous thrombosis admitted on 

8/13/2017





Abdominal pain secondary to colonic distention; a  recent history of 

sigmoidectomy (july 2017) secondary to volvulus done by Dr Kent.


stable


Minimal elevated lipase upon admission, possible secondary to dehydration , 

rechecked lipase, had down, lactic acid 1.1, mild leukocytosis


d/w surgeon, abd cT showed bowel distention is not new, head not feel has 

ischemic events going on


surgeon recs clear liquid diet, will continue and advise as tolerated


Was complaining heartburn yesterday with nausea and vomiting 1, KUB was not 

remarkable, continue home ranitidine , and add Maalox as needed for heartburn





Possible mesenteric venous thrombosis , possible currently stable because 

patient has no fever no chills, no reported abdominal pain, no nausea vomiting 

for now, and tolerated diet


- Patient has recently had CT abd with contrast so will attempt to visualize 

flow of mesenteric arteries and this possible venous thrombosis with  US 

mesenteric 


-  mesenteric duplex  US this am is not conclusive,  reported: "Nondiagnostic 

evaluation of the mesenteric venous vasculature due


to overlying bowel content. Unremarkable venous flow characteristics of the 

upper abdomen", discussed [facility] GI service, they recommend vascular surgeon 

consultation, to see any further evaluation and treatment, wife can no surgeon 

consult requested





Accelerated hypertension with history of HTN


- Continue triamterene/ HCTZ  PO , add lisinopril 2.5 mg by mouth daily, 

patient has diabetic,  this medicine will benefit for the prevention of 

diabetic kidney disease


- will have hydralazine prn for systolic > 180 





DMII 


 - patient has been of his medications for Dm, blood glucose fairly controlled


- Continue monitor the BSG and if the patient has consistently elevated BSG


- continue sliding scale, check a1c





Parkinson's 


Anxiety


Depression


GIULIA


Gout


The above condition is stable we'll continue current medication





DVT Prophylaxis 


- Upon admission due to uncertainty for need for intervention will place on SCD

, I feel for now patient will be okay to start covering for DVT prophylaxis








d/w pt and daughter of care plan, answer all questions, daughter will be okay 

to rehabilitation if needed per PT OT evaluation


Discontinue IV fluid, PTOT, discharge in 1-2 days





full code, but dose not want to prolonged life support


Continued [facility] stay due to:  home environment unsafe for pt


Discharge planning:  uncertain

## 2017-08-15 NOTE — SURGERY CONSULTATION
Consultation


Date of Service


Aug 15, 2017.


 (Sarah Suarez, EMMANUEL)





Chief Complaint


possible mesenteric v thombus


 (Sarah Suarez PA-C)





History of Present Illness


The patient is a 77 year old male s/p sigmoidectomy on 7/31/17, admitted with 

abd distention and decreased appetite, seen in consultation today for possible 

mesenteric v thrombus noted on CT scan.  Pt states feeling much improved 

presently.  Remains on clear liquid diet.  Denies HA, fever, chills, chest pain

, SOB, N/V, rest pain, claudication, other complaints.  CT report states fluid 

collection, possible mesenteric v thrombus d/t filling defect. 


 


 (Sarah Suarez, EMMANUEL)





Vitals





Vital Signs Past 12 Hours








  Date Time  Temp Pulse Resp B/P (MAP) Pulse Ox O2 Delivery O2 Flow Rate FiO2


 


8/15/17 09:04  66  171/78 (109)    


 


8/15/17 07:14 36.5 66 16 164/73 (103) 93 Room Air  








 (Sarah Suarez, EMMANUEL)





Allergies


Coded Allergies:  


     No Known Allergies (Verified , 2/21/17)





Home Medications


Scheduled


Allopurinol (Zyloprim), 300 MG PO DAILY


Aspirin (Aspirin Ec), 81 MG PO DAILY


Carbidopa/Levodopa (Sinemet 25MG/100MG), 1.5 TAB PO TID


Cholecalciferol (Vitamin D3), 2,000 UNITS PO DAILY


Cyanocobalamin (Vitamin B12), 1,000 MCG PO DAILY


Gabapentin (Neurontin), 300 MG PO QID


Ranitidine (Zantac), 150 MG PO BID


Sertraline (Zoloft), 1 TAB PO DAILY


Triamterene/Hctz (Triamterene/Hctz 37.5-25MG), 0.5 TAB PO DAILY





Scheduled PRN


Clonazepam (Klonopin), 0.25-0.5 MG PO HS PRN for Sleep


Miconazole Nitrate (Desenex Shake Powder), 1 APPLN EXT BID PRN for Affected 

Skin Folds


Ondansetron Hcl (Zofran), 4 MG PO q6hrs PRN for Nausea


Polyethylene Glycol 3350 (Bulk (Polyethylene Glycol 3350), 17 GM PO DAILY PRN 

for Constipation





Problem List


Medical Problems:


(1) BENIGN HYPERTENSION


(2) DIAB W KETOACIDOSIS, TYPE II OR UNSPEC TYPE, UNCONTROLLED


(3) DIVERTICULOSIS COLON (W/O MENT OF HEMORRHAGE)


(4) GOUT NOS


(5) IDIO PERIPH NEURPTHY NOS


(6) Ileus following gastrointestinal surgery


(7) Mesenteric venous thrombosis


(8) MIXED HYPERLIPIDEMIA


(9) Volvulus


 (Sarah Suarez, PA-C)





Surgical / Medical History


Hx Cardiac Surgery:  No


Hx Abdominal Surgery:  Yes (COLON CYST /3years ago  right colectomy)


Hx Cancer Surgery:  No


Hx Thoracic Surgery:  No


Hx Orthopedic:  Yes (RT KNEE 2007)


Hx Urinary Tract Surgery:  No


HX Other Surgery:  Yes (cataracts)


Past Medical/Surgical History:  Hypertension


 (Sarah Suarez, PA-C)





Family History





Diabetes mellitus


FH: breast cancer (Sarah Suarez, PA-C)





Diabetes mellitus


FH: breast cancer (Abelardo Laughlin M.D.)


Social History


Smoking Status:  Former Smoker


Hx Tobacco Use In Past Year?:  No


Hx Alcohol Use - Type & Amnt:  No


Hx Substance Use -Type & Amnt:  No


 (Sarah Suarez, PA-C)





Review of Systems


Constitutional:  + malaise, No chills


Skin:  No change in color


Eyes:  No visual changes


ENMT:  No sore throat


Respiratory:  No cough, No hemoptysis, No short of breath


Cardiovascular:  No chest pain, No chest tightness, No palpitations, No edema, 

No intermittent claudication


Gastrointestinal:  + abdominal pain, + appetite changes, No nausea


Neurologic:  No dizziness, No numbness, No tingling (Sarah Suarez, PA-C)





Physical Exam


Constitutional:  


   General Apperance:  well-nourished


   Level of Distress:  chronically ill


Psychiatric:  


   Mental Status:  active & alert, normal mood, normal affect


   Orientation:  oriented except where noted, to time, to place, to person


   Memory:  recent memory normal, remote memory normal


Head:  normocephalic, atraumatic


Eyes:  


   EOM:  EOMI


ENMT:  normal ENT inspection, hearing grossly normal


Neck:  supple, trachea midline


Lungs:  


   Respiratory effort:  no dyspnea


   Auscultation:  no wheezing, no rhonchi, decreased breath sounds


Cardiovascular:  


   Apical Impulse:  not displaced


   Heart Auscultation:  RRR, no rubs, no gallops


Peripheral Pulses:  


   Pulses:  full and equal, in all extremities except if noted


   Bruits:  none appreciated


   Carotid Pulse:  normal on the left, normal on the right


   Brachial Pulses:  normal on the left, normal on the right


   Radial Pulse:  normal on the left, normal on the right


   Femoral Pulse:  normal on the left, normal on the right


   Posterior Tibialis Pulse:  decreased on the left, decreased on the right


   Dorsalis Pedis Pulse:  decreased on the left, decreased on the right


Abdomen:  


   Bowel Sounds:  normal


   Inspection & Palpation:  soft, distended, pertinent finding (mild 

tenderness.  Incision healed.)


Extremities:  


   Upper Right:  no cyanosis, no edema, no varicosities


   Upper Left:  no cyanosis, no edema, no varicosities


   Lower Right:  no cyanosis, no edema, no varicosities


   Lower Left:  no cyanosis, no edema, no varicosities


Neurologic:  


   Cranial Nerves:  grossly intact


   Sensation:  grossly intact


 (Sarah Suarez, PA-C)





Assessment and Plan


ASSESSMENT and PLAN:


   Possible mesenteric v thrombus


      Pt discussed with Dr Laughlin, who also reviewed CT scan.  No vascular 

surgical intervention recommended.  Treat with anticoagulation if safe to do 

so.  Please call if needed.


 (Sarah Suarez, PA-C)


Patient was seen, examined, and chart reviewed.  Agree with exam and treatment 

plan of the Vascular PA.


Thank you very much for letting me participate in the care of this patient.


 (Abelardo Laughlin M.D.)

## 2017-08-15 NOTE — SURGERY PROGRESS NOTE
Surgery Progress Note


Date of Service


Aug 15, 2017.





Subjective


+ bowel movement (large, liquid), + nausea (some yesterday), + diet (clears), 

No complaints





Objective


Vital Signs:











  Date Time  Temp Pulse Resp B/P (MAP) Pulse Ox O2 Delivery O2 Flow Rate FiO2


 


8/15/17 09:04  66  171/78 (109)    


 


8/15/17 07:14 36.5 66 16 164/73 (103) 93 Room Air  


 


8/14/17 22:46 36.7 66 16 143/72 (95) 97 CPAP  


 


8/14/17 20:05      Room Air  


 


8/14/17 15:29 36.8 76 18 168/80 (109) 93 Room Air  


 


8/14/17 12:33  75  165/82 (109)    








Abdomen:  + distended (less, softer)


Incision(s):  clean, no erythema


Laboratory Results:





Results Past 24 Hours








Test


  8/14/17


20:58 8/15/17


05:01 8/15/17


07:52 Range/Units


 


 


Bedside Glucose 116  111 70-99  mg/dl


 


White Blood Count  7.67  4.8-10.8  K/uL


 


Red Blood Count  3.40  4.7-6.1  M/uL


 


Hemoglobin  10.2  14.0-18.0  g/dL


 


Hematocrit  32.3  42-52  %


 


Mean Corpuscular Volume  95.0    fL


 


Mean Corpuscular Hemoglobin  30.0  25-34  pg


 


Mean Corpuscular Hemoglobin


Concent 


  31.6


  


  32-36  g/dl


 


 


Platelet Count  375  130-400  K/uL


 


Mean Platelet Volume  9.9  7.4-10.4  fL


 


Neutrophils (%) (Auto)  59.2   %


 


Lymphocytes (%) (Auto)  21.0   %


 


Monocytes (%) (Auto)  16.7   %


 


Eosinophils (%) (Auto)  2.5   %


 


Basophils (%) (Auto)  0.1   %


 


Neutrophils # (Auto)  4.54  1.4-6.5  K/uL


 


Lymphocytes # (Auto)  1.61  1.2-3.4  K/uL


 


Monocytes # (Auto)  1.28  0.11-0.59  K/uL


 


Eosinophils # (Auto)  0.19  0-0.5  K/uL


 


Basophils # (Auto)  0.01  0-0.2  K/uL


 


RDW Standard Deviation  48.6  36.4-46.3  fL


 


RDW Coefficient of Variation  14.1  11.5-14.5  %


 


Immature Granulocyte % (Auto)  0.5   %


 


Immature Granulocyte # (Auto)  0.04  0.00-0.02  K/uL


 


Sodium Level  137  136-145  mmol/L


 


Potassium Level  3.5  3.5-5.1  mmol/L


 


Chloride Level  105    mmol/L


 


Carbon Dioxide Level  26  21-32  mmol/L


 


Anion Gap  6.0  3-11  mmol/L


 


Blood Urea Nitrogen  10  7-18  mg/dl


 


Creatinine


  


  0.84


  


  0.60-1.40


mg/dl


 


Est Creatinine Clear Calc


Drug Dose 


  93.7


  


   ml/min


 


 


Estimated GFR (


American) 


  98.6


  


   


 


 


Estimated GFR (Non-


American 


  85.1


  


   


 


 


BUN/Creatinine Ratio  12.0  10-20  


 


Random Glucose  91  70-99  mg/dl


 


Calcium Level  8.2  8.5-10.1  mg/dl


 


Magnesium Level  1.6  1.8-2.4  mg/dl


 


Total Bilirubin  0.6  0.2-1  mg/dl


 


Direct Bilirubin  0.2  0-0.2  mg/dl


 


Aspartate Amino Transf


(AST/SGOT) 


  14


  


  15-37  U/L


 


 


Alanine Aminotransferase


(ALT/SGPT) 


  11


  


  12-78  U/L


 


 


Alkaline Phosphatase  82    U/L


 


Total Protein  6.2  6.4-8.2  gm/dl


 


Albumin  2.5  3.4-5.0  gm/dl











Assessment & Plan


ileus s/p sigmoid resection for volvulus


   bowels moving, tolerating clears


   can advance diet as sugar, will add Boost bid


   skin staples removed, steris applied

## 2017-08-16 VITALS
TEMPERATURE: 98.78 F | HEART RATE: 64 BPM | OXYGEN SATURATION: 97 % | DIASTOLIC BLOOD PRESSURE: 69 MMHG | SYSTOLIC BLOOD PRESSURE: 129 MMHG

## 2017-08-16 VITALS
OXYGEN SATURATION: 95 % | TEMPERATURE: 98.24 F | SYSTOLIC BLOOD PRESSURE: 161 MMHG | HEART RATE: 66 BPM | DIASTOLIC BLOOD PRESSURE: 75 MMHG

## 2017-08-16 VITALS
SYSTOLIC BLOOD PRESSURE: 158 MMHG | HEART RATE: 63 BPM | OXYGEN SATURATION: 98 % | DIASTOLIC BLOOD PRESSURE: 75 MMHG | TEMPERATURE: 98.06 F

## 2017-08-16 LAB
ANION GAP SERPL CALC-SCNC: 6 MMOL/L (ref 3–11)
BASOPHILS # BLD: 0.02 K/UL (ref 0–0.2)
BASOPHILS NFR BLD: 0.3 %
BUN SERPL-MCNC: 8 MG/DL (ref 7–18)
BUN/CREAT SERPL: 8.7 (ref 10–20)
CALCIUM SERPL-MCNC: 8.4 MG/DL (ref 8.5–10.1)
CHLORIDE SERPL-SCNC: 105 MMOL/L (ref 98–107)
CO2 SERPL-SCNC: 28 MMOL/L (ref 21–32)
COMPLETE: YES
CREAT CL PREDICTED SERPL C-G-VRATE: 90.5 ML/MIN
CREAT SERPL-MCNC: 0.87 MG/DL (ref 0.6–1.4)
EOSINOPHIL NFR BLD AUTO: 348 K/UL (ref 130–400)
GLUCOSE SERPL-MCNC: 89 MG/DL (ref 70–99)
HCT VFR BLD CALC: 31.9 % (ref 42–52)
IG%: 0.9 %
IMM GRANULOCYTES NFR BLD AUTO: 30.6 %
LYMPHOCYTES # BLD: 1.98 K/UL (ref 1.2–3.4)
MAGNESIUM SERPL-MCNC: 2 MG/DL (ref 1.8–2.4)
MCH RBC QN AUTO: 30.2 PG (ref 25–34)
MCHC RBC AUTO-ENTMCNC: 32.3 G/DL (ref 32–36)
MCV RBC AUTO: 93.5 FL (ref 80–100)
MONOCYTES NFR BLD: 15.6 %
NEUTROPHILS # BLD AUTO: 2.8 %
NEUTROPHILS NFR BLD AUTO: 49.8 %
PMV BLD AUTO: 9.7 FL (ref 7.4–10.4)
POTASSIUM SERPL-SCNC: 3.8 MMOL/L (ref 3.5–5.1)
RBC # BLD AUTO: 3.41 M/UL (ref 4.7–6.1)
SODIUM SERPL-SCNC: 139 MMOL/L (ref 136–145)
WBC # BLD AUTO: 6.48 K/UL (ref 4.8–10.8)

## 2017-08-16 RX ADMIN — Medication SCH MG: at 09:18

## 2017-08-16 RX ADMIN — GABAPENTIN SCH MG: 300 CAPSULE ORAL at 21:11

## 2017-08-16 RX ADMIN — HEPARIN SODIUM SCH UNIT: 10000 INJECTION, SOLUTION INTRAVENOUS; SUBCUTANEOUS at 06:02

## 2017-08-16 RX ADMIN — MAXZIDE SCH TAB: 37.5; 25 TABLET ORAL at 09:19

## 2017-08-16 RX ADMIN — MICONAZOLE NITRATE PRN APPLN: 20 POWDER TOPICAL at 21:11

## 2017-08-16 RX ADMIN — GABAPENTIN SCH MG: 300 CAPSULE ORAL at 13:42

## 2017-08-16 RX ADMIN — CARBIDOPA AND LEVODOPA SCH TAB: 25; 100 TABLET ORAL at 13:42

## 2017-08-16 RX ADMIN — Medication SCH INTER.UNIT: at 09:19

## 2017-08-16 RX ADMIN — CARBIDOPA AND LEVODOPA SCH TAB: 25; 100 TABLET ORAL at 09:19

## 2017-08-16 RX ADMIN — LISINOPRIL SCH MG: 2.5 TABLET ORAL at 09:19

## 2017-08-16 RX ADMIN — ALLOPURINOL SCH MG: 300 TABLET ORAL at 09:18

## 2017-08-16 RX ADMIN — CARBIDOPA AND LEVODOPA SCH TAB: 25; 100 TABLET ORAL at 21:12

## 2017-08-16 RX ADMIN — RANITIDINE SCH MG: 150 TABLET ORAL at 21:11

## 2017-08-16 RX ADMIN — WARFARIN SODIUM SCH MG: 5 TABLET ORAL at 17:02

## 2017-08-16 RX ADMIN — MICONAZOLE NITRATE PRN APPLN: 20 POWDER TOPICAL at 17:04

## 2017-08-16 RX ADMIN — SERTRALINE HYDROCHLORIDE SCH MG: 50 TABLET, FILM COATED ORAL at 09:20

## 2017-08-16 RX ADMIN — GABAPENTIN SCH MG: 300 CAPSULE ORAL at 17:02

## 2017-08-16 RX ADMIN — GABAPENTIN SCH MG: 300 CAPSULE ORAL at 09:18

## 2017-08-16 RX ADMIN — RANITIDINE SCH MG: 150 TABLET ORAL at 09:18

## 2017-08-16 RX ADMIN — Medication SCH MCG: at 09:18

## 2017-08-16 RX ADMIN — HEPARIN SODIUM SCH UNIT: 10000 INJECTION, SOLUTION INTRAVENOUS; SUBCUTANEOUS at 13:45

## 2017-08-16 RX ADMIN — HEPARIN SODIUM SCH UNIT: 10000 INJECTION, SOLUTION INTRAVENOUS; SUBCUTANEOUS at 21:13

## 2017-08-16 NOTE — SURGERY PROGRESS NOTE
Surgery Progress Note


Date of Service


Aug 16, 2017.





Subjective


+ flatus, + diet (tolerating liquids), No complaints, No bowel movement (today)





Objective


Vital Signs:











  Date Time  Temp Pulse Resp B/P (MAP) Pulse Ox O2 Delivery O2 Flow Rate FiO2


 


8/16/17 07:45      Room Air  


 


8/16/17 07:44 36.8 66 16 161/75 (103) 95 Room Air  


 


8/15/17 23:50      Room Air  





      CPAP  


 


8/15/17 23:03 36.9 56 16 114/64 (81) 97 Room Air  


 


8/15/17 15:44      Room Air  





      CPAP  


 


8/15/17 15:00 36.6 56 18 146/71 (96) 96 Room Air  


 


8/15/17 12:08  58  133/68 (89)    








Abdomen:  soft, + distended (slightly)


Laboratory Results:





Results Past 24 Hours








Test


  8/15/17


11:57 8/15/17


16:56 8/15/17


20:47 8/16/17


05:08 Range/Units


 


 


Bedside Glucose 121 103 109  70-99  mg/dl


 


White Blood Count    6.48 4.8-10.8  K/uL


 


Red Blood Count    3.41 4.7-6.1  M/uL


 


Hemoglobin    10.3 14.0-18.0  g/dL


 


Hematocrit    31.9 42-52  %


 


Mean Corpuscular Volume    93.5   fL


 


Mean Corpuscular Hemoglobin    30.2 25-34  pg


 


Mean Corpuscular Hemoglobin


Concent 


  


  


  32.3


  32-36  g/dl


 


 


Platelet Count    348 130-400  K/uL


 


Mean Platelet Volume    9.7 7.4-10.4  fL


 


Neutrophils (%) (Auto)    49.8  %


 


Lymphocytes (%) (Auto)    30.6  %


 


Monocytes (%) (Auto)    15.6  %


 


Eosinophils (%) (Auto)    2.8  %


 


Basophils (%) (Auto)    0.3  %


 


Neutrophils # (Auto)    3.23 1.4-6.5  K/uL


 


Lymphocytes # (Auto)    1.98 1.2-3.4  K/uL


 


Monocytes # (Auto)    1.01 0.11-0.59  K/uL


 


Eosinophils # (Auto)    0.18 0-0.5  K/uL


 


Basophils # (Auto)    0.02 0-0.2  K/uL


 


RDW Standard Deviation    47.5 36.4-46.3  fL


 


RDW Coefficient of Variation    13.9 11.5-14.5  %


 


Immature Granulocyte % (Auto)    0.9  %


 


Immature Granulocyte # (Auto)    0.06 0.00-0.02  K/uL


 


Sodium Level    139 136-145  mmol/L


 


Potassium Level    3.8 3.5-5.1  mmol/L


 


Chloride Level    105   mmol/L


 


Carbon Dioxide Level    28 21-32  mmol/L


 


Anion Gap    6.0 3-11  mmol/L


 


Blood Urea Nitrogen    8 7-18  mg/dl


 


Creatinine


  


  


  


  0.87


  0.60-1.40


mg/dl


 


Est Creatinine Clear Calc


Drug Dose 


  


  


  90.5


   ml/min


 


 


Estimated GFR (


American) 


  


  


  97.2


   


 


 


Estimated GFR (Non-


American 


  


  


  83.8


   


 


 


BUN/Creatinine Ratio    8.7 10-20  


 


Random Glucose    89 70-99  mg/dl


 


Calcium Level    8.4 8.5-10.1  mg/dl


 


Magnesium Level    2.0 1.8-2.4  mg/dl


 


Test


  8/16/17


08:12 


  


  


  Range/Units


 


 


Bedside Glucose 89    70-99  mg/dl











Assessment & Plan


ileus s/p sigmoid resection for volvulus


   continue diet as sugar


   PT


   follow-up in surgery clinic 2 weeks after d/c

## 2017-08-16 NOTE — PROGRESS NOTE
Subjective


Date of Service:


Aug 16, 2017.


Subjective


Pt evaluation today including:  conversation w/ patient, conversation w/ family

, physical exam, chart review, lab review, review of studies, conversation w/ 

consultant, review of inpatient medication list


Doing much better, up and walk, tolerated current diet, planning to advised to 

regular diabetic diet





Problem List


Medical Problems:


(1) Abdominal distension


Status: Acute  





(2) Rhabdomyolysis


Status: Acute  





(3) Sigmoid volvulus


Status: Acute  





(4) Vomiting


Status: Acute  





(5) Weakness


Status: Acute  





Social History Problems:


(1) Status post partial colectomy


Status: Acute  








Review of Systems


Constitutional:  No fever, No chills, No sweats, No weight loss, No weakness, 

No fatigue, No problem reported


Eyes:  No worsening of vision, No eye pain, No redness, No discharge, No 

diplopia


ENT:  No hearing loss, No unusual epistaxis, No nasal symptoms, No sore throat, 

No tinnitus, No dental problems, No trouble swallowing


Respiratory:  No cough, No sputum, No wheezing, No shortness of breath, No 

dyspnea on exertion, No dyspnea at rest, No hemoptysis


Cardiac:  No chest pain, No orthopnea, No PND, No edema, No claudication, No 

palpitations


Abdomen:  No pain, No nausea, No vomiting, No diarrhea, No constipation


Musculoskeletal:  No joint pain, No muscle pain, No swelling, No calf pain


Male :  No dysuria, No urinary frequency, No incontinence, No nocturia more 

than once/night, No slowing stream, No hematuria


Neurologic:  No memory loss, No paralysis, No weakness, No numbness/tingling, 

No vertigo, No balance problems


Psychiatric:  No depression symptoms, No anhedonism, No anxiety, No insomnia, 

No substance abuse


Heme:  No abnormal bleeding/bruising, No clotting problems, No swollen lymph 

nodes, No night sweats


Endo:  No fatigue, No excessive thirst, No excessive urination


Skin:  No rash, No itch, No new/changing skin lesions, No color change, No 

bleeding





Objective


Vital Signs











  Date Time  Temp Pulse Resp B/P (MAP) Pulse Ox O2 Delivery O2 Flow Rate FiO2


 


8/16/17 15:45      Room Air  


 


8/16/17 15:16 37.1 64 16 129/69 (89) 97 Room Air  


 


8/16/17 07:45      Room Air  


 


8/16/17 07:44 36.8 66 16 161/75 (103) 95 Room Air  


 


8/15/17 23:50      Room Air  





      CPAP  


 


8/15/17 23:03 36.9 56 16 114/64 (81) 97 Room Air  











Physical Exam


General Appearance:  WD/WN, no apparent distress, + pertinent finding (looks 

much better, and energetic)


Eyes:  normal inspection, PERRL, EOMI, sclerae normal


ENT:  normal ENT inspection, hearing grossly normal, pharynx normal


Neck:  supple, no adenopathy, thyroid normal, no JVD, no carotid bruits, 

trachea midline


Respiratory/Chest:  chest non-tender, normal breath sounds, no respiratory 

distress, no accessory muscle use, + decreased breath sounds


Cardiovascular:  regular rate, rhythm, no edema, no gallop, no JVD, no murmur


Abdomen:  normal bowel sounds, non tender, soft, no organomegaly, no pulsatile 

mass


Extremities:  normal range of motion, non-tender, normal inspection, no pedal 

edema, no calf tenderness, normal capillary refill, pelvis stable


Neurologic/Psychiatric:  CNs II-XII nml as tested, no motor/sensory deficits, 

alert, normal mood/affect, oriented x 3


Skin:  normal color, warm/dry, no rash


Lymphatic:  no adenopathy





Laboratory Results





Last 24 Hours








Test


  8/15/17


20:47 8/16/17


05:08 8/16/17


08:12 8/16/17


12:07


 


Bedside Glucose 109 mg/dl   89 mg/dl  129 mg/dl 


 


White Blood Count  6.48 K/uL   


 


Red Blood Count  3.41 M/uL   


 


Hemoglobin  10.3 g/dL   


 


Hematocrit  31.9 %   


 


Mean Corpuscular Volume  93.5 fL   


 


Mean Corpuscular Hemoglobin  30.2 pg   


 


Mean Corpuscular Hemoglobin


Concent 


  32.3 g/dl 


  


  


 


 


Platelet Count  348 K/uL   


 


Mean Platelet Volume  9.7 fL   


 


Neutrophils (%) (Auto)  49.8 %   


 


Lymphocytes (%) (Auto)  30.6 %   


 


Monocytes (%) (Auto)  15.6 %   


 


Eosinophils (%) (Auto)  2.8 %   


 


Basophils (%) (Auto)  0.3 %   


 


Neutrophils # (Auto)  3.23 K/uL   


 


Lymphocytes # (Auto)  1.98 K/uL   


 


Monocytes # (Auto)  1.01 K/uL   


 


Eosinophils # (Auto)  0.18 K/uL   


 


Basophils # (Auto)  0.02 K/uL   


 


RDW Standard Deviation  47.5 fL   


 


RDW Coefficient of Variation  13.9 %   


 


Immature Granulocyte % (Auto)  0.9 %   


 


Immature Granulocyte # (Auto)  0.06 K/uL   


 


Sodium Level  139 mmol/L   


 


Potassium Level  3.8 mmol/L   


 


Chloride Level  105 mmol/L   


 


Carbon Dioxide Level  28 mmol/L   


 


Anion Gap  6.0 mmol/L   


 


Blood Urea Nitrogen  8 mg/dl   


 


Creatinine  0.87 mg/dl   


 


Est Creatinine Clear Calc


Drug Dose 


  90.5 ml/min 


  


  


 


 


Estimated GFR (


American) 


  97.2 


  


  


 


 


Estimated GFR (Non-


American 


  83.8 


  


  


 


 


BUN/Creatinine Ratio  8.7   


 


Random Glucose  89 mg/dl   


 


Calcium Level  8.4 mg/dl   


 


Magnesium Level  2.0 mg/dl   











Assessment and Plan


76 yo m with a recent history of sigmoidectomy secondary to volvulus suffering 

from colonic distention and a possible mesenteric venous thrombosis admitted on 

8/13/2017





Abdominal pain secondary to colonic distention; a  recent history of 

sigmoidectomy (july 2017) secondary to volvulus done by Dr Kent.


Continue stable





Minimal elevated lipase upon admission, possible secondary to dehydration , 

rechecked lipase, had down, lactic acid 1.1, mild leukocytosis


d/w surgeon, abd cT showed bowel distention is not new, head not feel has 

ischemic events going on


surgeon recs clear liquid diet, will advise as tolerated


Was complaining heartburn 2 days ago with nausea and vomiting 1, KUB was not 

remarkable, totally resolved 


continue home ranitidine , and add Maalox as needed for heartburn





Possible mesenteric venous thrombosis , possible currently stable because 

patient has no fever no chills, no reported abdominal pain, no nausea vomiting 

for now, and tolerated diet


- Patient has recently had CT abd with contrast so will attempt to visualize 

flow of mesenteric arteries and this possible venous thrombosis with  US 

mesenteric 


-  mesenteric duplex  US this am is not conclusive,  reported: "Nondiagnostic 

evaluation of the mesenteric venous vasculature due


to overlying bowel content. Unremarkable venous flow characteristics of the 

upper abdomen", discussed [facility] GI service, they recommend vascular surgeon 

consultation,


dr Laughlin, who also reviewed CT scan.  No vascular surgical intervention 

recommended., he recs tr eat with anticoagulation if safe to do so, 


Today I discussed with patient, and patient's daughter and son in bedside, 

discussed the risk and benefit of Coumadin for anticoagulation, daughter fully 

understand and agreed to start Coumadin





Accelerated hypertension with history of HTN, blood pressure in normal range 

today


- Continue triamterene/ HCTZ  PO , add lisinopril 2.5 mg by mouth daily, 

patient has diabetic,  this medicine will benefit for the prevention of 

diabetic kidney disease


- will have hydralazine prn for systolic > 180 





DMII 


 - patient has been of his medications for Dm, blood glucose fairly controlled


- Continue monitor the BSG and if the patient has consistently elevated BSG


- continue sliding scale, check a1c





Parkinson's 


Anxiety


Depression


GIULIA


Gout


The above condition is stable we'll continue current medication





DVT Prophylaxis 


On heparin subcutaneous





d/w'ed pt and daughter of care plan, answer all questions, daughter okay to 

rehabilitation per PT OT evaluation


 talk to patient and daughter already, referral to [facility], 

with the [facility]  as back up 





full code, but dose not want to prolonged life support


Continued [facility] stay due to:  home environment unsafe for pt


Discharge planning:  rehab hospital

## 2017-08-17 VITALS
HEART RATE: 65 BPM | SYSTOLIC BLOOD PRESSURE: 146 MMHG | TEMPERATURE: 98.06 F | OXYGEN SATURATION: 96 % | DIASTOLIC BLOOD PRESSURE: 69 MMHG

## 2017-08-17 VITALS
SYSTOLIC BLOOD PRESSURE: 146 MMHG | HEART RATE: 65 BPM | DIASTOLIC BLOOD PRESSURE: 69 MMHG | OXYGEN SATURATION: 96 % | TEMPERATURE: 98.06 F

## 2017-08-17 VITALS
HEART RATE: 65 BPM | OXYGEN SATURATION: 97 % | SYSTOLIC BLOOD PRESSURE: 149 MMHG | TEMPERATURE: 98.24 F | DIASTOLIC BLOOD PRESSURE: 75 MMHG

## 2017-08-17 LAB
ANION GAP SERPL CALC-SCNC: 6 MMOL/L (ref 3–11)
BASOPHILS # BLD: 0.02 K/UL (ref 0–0.2)
BASOPHILS NFR BLD: 0.3 %
BUN SERPL-MCNC: 11 MG/DL (ref 7–18)
BUN/CREAT SERPL: 11.3 (ref 10–20)
CALCIUM SERPL-MCNC: 8.7 MG/DL (ref 8.5–10.1)
CHLORIDE SERPL-SCNC: 104 MMOL/L (ref 98–107)
CO2 SERPL-SCNC: 29 MMOL/L (ref 21–32)
COMPLETE: YES
CREAT CL PREDICTED SERPL C-G-VRATE: 78.7 ML/MIN
CREAT SERPL-MCNC: 1 MG/DL (ref 0.6–1.4)
EOSINOPHIL NFR BLD AUTO: 397 K/UL (ref 130–400)
GLUCOSE SERPL-MCNC: 118 MG/DL (ref 70–99)
HCT VFR BLD CALC: 34.8 % (ref 42–52)
IG%: 0.8 %
IMM GRANULOCYTES NFR BLD AUTO: 29.6 %
INR PPP: 1.1 (ref 0.9–1.1)
LYMPHOCYTES # BLD: 1.94 K/UL (ref 1.2–3.4)
MAGNESIUM SERPL-MCNC: 1.9 MG/DL (ref 1.8–2.4)
MCH RBC QN AUTO: 29.5 PG (ref 25–34)
MCHC RBC AUTO-ENTMCNC: 31 G/DL (ref 32–36)
MCV RBC AUTO: 95.1 FL (ref 80–100)
MONOCYTES NFR BLD: 13 %
NEUTROPHILS # BLD AUTO: 2.6 %
NEUTROPHILS NFR BLD AUTO: 53.7 %
PMV BLD AUTO: 9.8 FL (ref 7.4–10.4)
POTASSIUM SERPL-SCNC: 3.5 MMOL/L (ref 3.5–5.1)
PROTHROMBIN TIME: 11.4 SECONDS (ref 9–12)
RBC # BLD AUTO: 3.66 M/UL (ref 4.7–6.1)
SODIUM SERPL-SCNC: 139 MMOL/L (ref 136–145)
WBC # BLD AUTO: 6.56 K/UL (ref 4.8–10.8)

## 2017-08-17 RX ADMIN — RANITIDINE SCH MG: 150 TABLET ORAL at 11:19

## 2017-08-17 RX ADMIN — Medication SCH MCG: at 11:15

## 2017-08-17 RX ADMIN — GABAPENTIN SCH MG: 300 CAPSULE ORAL at 13:45

## 2017-08-17 RX ADMIN — MAXZIDE SCH TAB: 37.5; 25 TABLET ORAL at 11:18

## 2017-08-17 RX ADMIN — HEPARIN SODIUM SCH UNIT: 10000 INJECTION, SOLUTION INTRAVENOUS; SUBCUTANEOUS at 05:37

## 2017-08-17 RX ADMIN — HEPARIN SODIUM SCH UNIT: 10000 INJECTION, SOLUTION INTRAVENOUS; SUBCUTANEOUS at 13:47

## 2017-08-17 RX ADMIN — GABAPENTIN SCH MG: 300 CAPSULE ORAL at 17:04

## 2017-08-17 RX ADMIN — Medication SCH INTER.UNIT: at 11:17

## 2017-08-17 RX ADMIN — SERTRALINE HYDROCHLORIDE SCH MG: 50 TABLET, FILM COATED ORAL at 11:18

## 2017-08-17 RX ADMIN — GABAPENTIN SCH MG: 300 CAPSULE ORAL at 11:19

## 2017-08-17 RX ADMIN — Medication SCH MG: at 11:15

## 2017-08-17 RX ADMIN — CARBIDOPA AND LEVODOPA SCH TAB: 25; 100 TABLET ORAL at 13:46

## 2017-08-17 RX ADMIN — CARBIDOPA AND LEVODOPA SCH TAB: 25; 100 TABLET ORAL at 11:16

## 2017-08-17 RX ADMIN — LISINOPRIL SCH MG: 2.5 TABLET ORAL at 11:17

## 2017-08-17 RX ADMIN — WARFARIN SODIUM SCH MG: 5 TABLET ORAL at 16:06

## 2017-08-17 RX ADMIN — ALLOPURINOL SCH MG: 300 TABLET ORAL at 11:15

## 2017-08-17 RX ADMIN — MICONAZOLE NITRATE PRN APPLN: 20 POWDER TOPICAL at 16:07

## 2017-08-17 NOTE — SURGERY PROGRESS NOTE
Surgery Progress Note


Date of Service


Aug 17, 2017.





Subjective


+ feeling well


tolerating a diet. no pain. no complaints.





Objective


Vital Signs:











  Date Time  Temp Pulse Resp B/P (MAP) Pulse Ox O2 Delivery O2 Flow Rate FiO2


 


8/17/17 07:20      Room Air  


 


8/17/17 07:03 36.8 65 18 149/75 (99) 97 Room Air  


 


8/16/17 22:50 36.7 63 20 158/75 (102) 98 Room Air  


 


8/16/17 20:17      Room Air  





      CPAP  


 


8/16/17 15:45      Room Air  


 


8/16/17 15:16 37.1 64 16 129/69 (89) 97 Room Air  








General Appearance:  no apparent distress


Respiratory/Chest:  no respiratory distress, no accessory muscle use


Abdomen:  non tender, soft


Incision(s):  clean, dry, intact


Laboratory Results:





Results Past 24 Hours








Test


  8/16/17


12:07 8/16/17


20:24 8/17/17


05:04 8/17/17


07:55 Range/Units


 


 


Bedside Glucose 129 117  117 70-99  mg/dl


 


White Blood Count   6.56  4.8-10.8  K/uL


 


Red Blood Count   3.66  4.7-6.1  M/uL


 


Hemoglobin   10.8  14.0-18.0  g/dL


 


Hematocrit   34.8  42-52  %


 


Mean Corpuscular Volume   95.1    fL


 


Mean Corpuscular Hemoglobin   29.5  25-34  pg


 


Mean Corpuscular Hemoglobin


Concent 


  


  31.0


  


  32-36  g/dl


 


 


Platelet Count   397  130-400  K/uL


 


Mean Platelet Volume   9.8  7.4-10.4  fL


 


Neutrophils (%) (Auto)   53.7   %


 


Lymphocytes (%) (Auto)   29.6   %


 


Monocytes (%) (Auto)   13.0   %


 


Eosinophils (%) (Auto)   2.6   %


 


Basophils (%) (Auto)   0.3   %


 


Neutrophils # (Auto)   3.53  1.4-6.5  K/uL


 


Lymphocytes # (Auto)   1.94  1.2-3.4  K/uL


 


Monocytes # (Auto)   0.85  0.11-0.59  K/uL


 


Eosinophils # (Auto)   0.17  0-0.5  K/uL


 


Basophils # (Auto)   0.02  0-0.2  K/uL


 


RDW Standard Deviation   48.3  36.4-46.3  fL


 


RDW Coefficient of Variation   14.0  11.5-14.5  %


 


Immature Granulocyte % (Auto)   0.8   %


 


Immature Granulocyte # (Auto)   0.05  0.00-0.02  K/uL


 


Prothrombin Time


  


  


  11.4


  


  9.0-12.0


SECONDS


 


Prothromb Time International


Ratio 


  


  1.1


  


  0.9-1.1  


 


 


Sodium Level   139  136-145  mmol/L


 


Potassium Level   3.5  3.5-5.1  mmol/L


 


Chloride Level   104    mmol/L


 


Carbon Dioxide Level   29  21-32  mmol/L


 


Anion Gap   6.0  3-11  mmol/L


 


Blood Urea Nitrogen   11  7-18  mg/dl


 


Creatinine


  


  


  1.00


  


  0.60-1.40


mg/dl


 


Est Creatinine Clear Calc


Drug Dose 


  


  78.7


  


   ml/min


 


 


Estimated GFR (


American) 


  


  84.4


  


   


 


 


Estimated GFR (Non-


American 


  


  72.8


  


   


 


 


BUN/Creatinine Ratio   11.3  10-20  


 


Random Glucose   118  70-99  mg/dl


 


Calcium Level   8.7  8.5-10.1  mg/dl


 


Magnesium Level   1.9  1.8-2.4  mg/dl











Assessment & Plan


looks great clinically


ok for d/c to rehab when ok with primary service.


f/u with me in 2 weeks.

## 2017-08-17 NOTE — DISCHARGE INSTRUCTIONS
Discharge Instructions


Date of Service


Aug 17, 2017.





Admission


Reason for Admission:  Ileus Following Gastrointestinal Sugery,Mesenteric





Discharge


Discharge Diagnosis / Problem:  Abdominal pain likely secondary to colonic 

distention





Discharge Goals


Goal(s):  Decrease discomfort, Improve function, Increase independence, Improve 

disease control, Improve nutritional status, Learn about illness, Diagnostic 

testing, Therapeutic intervention, Prevent Disease Progression, Specific goals





Activity Recommendations


Activity Level:  Up Ad Kristy





.





Additional Information


Patient informed of condition:  Yes


Advance Directives:  Yes


DNR:  No


Level of Care:  Acute Rehab


Communicable Disease:  No


Prognosis:  Other (guarded)


Weaver Catheter:  No





Instructions / Follow-Up


Instructions / Follow-Up


you have Abdominal pain secondary to colonic distention; 


you possible GERD with heartburn , you can continue home Ranitidine , and add 

Maalox as needed for heartburn


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 

started Coumadin for anticoagulation, need to check PT INR  while in rehab 


Per Dr. Laughlin, Vascular surgeon, possible only need 3 month of coumadin, and 

no need Lovenox bridging


[facility] please call to Dr. Laughlin's office to arrange to be seen in 1-2 week





fall precaution





- you need to follow up with your primary care physician in 1 week,


- Follow up your surgeon as instructed


- take medication as instructed, never overdose or any misuse, or take with 

alcohol,   because misuse of medicine may  cause organ damage or death, call 

your primary care physician if have questions of medicaitons.


- call your primary care physician OR go to local emergency room if has any 

fever/chill, chest pain, shortness of breathing, nausea/vomiting/abdominal pain

, facial droop/slurry speech/local weakness, or if has any questions. 


- diet as instructed


- you need to follow up with your subspecialist 


- you should understand that it is important to follow up the above instruction

, and "not following the above instruction" may cause delayed or missed care of 

your medical conditions which may cause permanent organ damage and even death.





Current Hospital Diet


Patient's current hospital diet: Diabetes Type 2 Diet





Discharge Diet


Recommended Diet:  Diabetes Type 2 Diet





Pending Studies


Studies pending at discharge:  no





Laboratory Results





Hemoglobin A1c








Test


  8/15/17


05:01 Range/Units


 


 


Estimated Average Glucose 111   mg/dl


 


Hemoglobin A1c 5.5  4.5-5.6  %








Lipid Panel








Test


  8/5/17


05:30 Range/Units


 


 


Triglycerides Level 74  0-150  mg/dl


 


Cholesterol Level 74  0-200  mg/dl


 


HDL Cholesterol 26   mg/dl


 


Cholesterol/HDL Ratio 2.8   


 


LDL Cholesterol, Calculated 33   mg/dl











Medical Emergencies








.


Who to Call and When:





Medical Emergencies:  If at any time you feel your situation is an emergency, 

please call 911 immediately.





.





Non-Emergent Contact


Non-Emergency issues call your:  Primary Care Provider





.


.








"Provider Documentation" section prepared by Ciro Rose.








.





Core Measure Problem


Core Measures:  VTE





VTE Core Measures


Date of VTE Diagnosis:  Aug 13, 2017


Time of VTE Diagnosis:  17:01


Reason no anticoag overlap I/P:  Treatment provided - N/A


Reason no anticoag overlap @DC:  Treatment not indicated

## 2017-08-17 NOTE — DISCHARGE SUMMARY
Discharge Summary


Date of Service


Aug 17, 2017.





Discharge Summary


Admission Date:


Aug 16, 2017 at 20:17


Discharge Date:  Aug 17, 2017


Principal Diagnosis:   Abdominal pain secondary to colonic distention


Problems/Secondary Diagnoses:


GERD with heartburn , you can continue home Ranitidine , and add Maalox as 

needed for heartburn


mesenteric venous thrombosis possible from recent surgery ,


Immunizations:  


   Have You Had Influenza Vaccine:  Unknown


   History of Tetanus Vaccine?:  Unknown


   History of Pneumococcal:  Unknown


   History of Hepatitis B Vaccine:  Unknown


Consultations:


Surgeon, and vascular surgeon





Medication Reconciliation


New Medications:  


Lisinopril (Lisinopril) 2.5 Mg Tab


2.5 MG PO QAM for 30 Days, #30 TAB





Warfarin Sod (Coumadin) 5 Mg Tab


5 MG PO DAILY@1600 for 30 Days, TAB





 


Changed Medications:  


Clonazepam (Klonopin) 0.5 Mg Tab


0.25 MG PO HS PRN for Sleep for 3 Days, #3 TAB (Changed from: 0.25-0.5 MG)





 


Continued Medications:  


Allopurinol (Zyloprim) 300 Mg Tab


300 MG PO DAILY, TAB





Carbidopa/Levodopa (Sinemet 25MG/100MG)  Tab


1.5 TAB PO TID for 30 Days, TAB





Cholecalciferol (Vitamin D3) 2,000 Unit Cap


2000 UNITS PO DAILY for 90 Days, CAP 3 Refills





Cyanocobalamin (Vitamin B12) 1,000 Mcg Tab


1000 MCG PO DAILY for 30 Days





Gabapentin (Neurontin) 300 Mg Cap


300 MG PO QID, CAP





Miconazole Nitrate (Desenex Shake Powder) 43 Appln/43 Gm Powd


1 APPLN EXT BID PRN for Affected Skin Folds for 30 Days





Ondansetron Hcl (Zofran) 4 Mg Tab


4 MG PO q6hrs PRN for Nausea, TAB





Polyethylene Glycol 3350 (Bulk (Polyethylene Glycol 3350) 1 Pow Pow


17 GM PO DAILY PRN for Constipation, #255 GM





Ranitidine (Zantac) 150 Mg Tab


150 MG PO BID, TAB





Sertraline (Zoloft) 25 Mg Tab


1 TAB PO DAILY for 30 Days, #30 TAB 2 Refills





Triamterene/Hctz (Triamterene/Hctz 37.5-25MG) 1 Tab Tab


0.5 TAB PO DAILY for 30 Days, #15 TAB 0 Refills





 


Discontinued Medications:  


Aspirin (Aspirin Ec) 81 Mg Tab


81 MG PO DAILY











Discharge Exam


Doing okay, feel more energetic, tolerate diet, up and walk,


Review of Systems:  


   Constitutional:  No fever, No chills, No sweats, No weight loss, No weakness

, No fatigue, No problem reported


   Eyes:  No worsening of vision, No eye pain, No redness, No discharge, No 

diplopia, No problem reported


   ENT:  No hearing loss, No unusual epistaxis, No nasal symptoms, No sore 

throat, No tinnitus, No dental problems, No trouble swallowing, No problem 

reported


   Respiratory:  No cough, No sputum, No wheezing, No shortness of breath, No 

dyspnea on exertion, No dyspnea at rest, No hemoptysis, No problem reported


   Cardiovascular:  No chest pain, No orthopnea, No PND, No edema, No 

claudication, No palpitations, No problem reported


   Abdomen:  No pain, No nausea, No vomiting, No diarrhea, No constipation, No 

GI bleeding, No problem reported


   Genitourinary - Male:  No hematuria, No dysuria, No urinary frequency, No 

urinary urgency, No urinary hesitancy, No urinary retention, No urinary 

incontinence, No penile discharge, No lesions, No impotence, No problem reported


   Neurologic:  No memory loss, No paralysis, No weakness, No numbness/tingling

, No vertigo, No balance problems, No problem reported


   Psychiatric:  No depression symptoms, No anhedonism, No anxiety, No insomnia

, No substance abuse, No problem reported


   Endocrine:  No fatigue, No excessive thirst, No excessive urination, No 

problem reported


   Hematologic / Lymphatic:  No abnormal bleeding/bruising, No clotting problems

, No swollen lymph nodes, No night sweats, No problem reported


   Integumentary:  No rash, No itch, No new/changing skin lesions, No color 

change, No bleeding, No problem reported


Physical Exam:  


   General Appearance:  WD/WN, no apparent distress, + pertinent finding (frail

, mild older than age)


   Eyes:  normal inspection, PERRL, EOMI


   ENT:  normal ENT inspection, hearing grossly normal, TMs normal


   Neck:  supple, no adenopathy, thyroid normal


   Respiratory/Chest:  chest non-tender, no respiratory distress, no accessory 

muscle use, + decreased breath sounds


   Cardiovascular:  regular rate, rhythm, no edema, no gallop, no JVD, no murmur

, normal peripheral pulses


   Abdomen / GI:  normal bowel sounds, non tender, soft, no organomegaly, no 

pulsatile mass


   Extremities:  normal inspection, no calf tenderness, normal capillary refill


   Neurologic/Psychiatric:  CNs II-XII nml as tested, no motor/sensory deficits

, alert, normal mood/affect, normal reflexes, oriented x 3


   Skin:  normal color, warm/dry, no rash





Hospital Course


76 yo m with a recent history of sigmoidectomy secondary to volvulus suffering 

from colonic distention and a possible mesenteric venous thrombosis admitted on 

8/13/2017





Abdominal pain secondary to colonic distention; a  recent history of 

sigmoidectomy (july 2017) secondary to volvulus done by Dr Kent.


Continue stable





Minimal elevated lipase upon admission, possible secondary to dehydration , 

rechecked lipase, had down, lactic acid 1.1, mild leukocytosis


d/w surgeon, abd cT showed bowel distention is not new, head not feel has 

ischemic events going on


surgeon recs clear liquid diet,, has advanced as tolerated, continue tolerated 

well


Possible GERD and Was complaining heartburn 2 days ago with nausea and vomiting 

1, KUB was not remarkable, totally resolved 


continue home ranitidine , and add Maalox as needed for heartburn





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 

started Coumadin for anticoagulation, need to check PT INR  while in rehab 


Per Dr. Laughlin, Vascular surgeon, possible only need 3 month of coumadin, and 

no need Lovenox bridging upon discharge


stable because patient has no fever no chills, no reported abdominal pain, no 

nausea vomiting for now, and tolerated diet


- Patient has recently had CT abd with contrast so will attempt to visualize 

flow of mesenteric arteries and this possible venous thrombosis with  US 

mesenteric 


-  mesenteric duplex  US this am is not conclusive,  reported: "Nondiagnostic 

evaluation of the mesenteric venous vasculature due


to overlying bowel content. Unremarkable venous flow characteristics of the 

upper abdomen", discussed [facility] GI service, they recommend vascular surgeon 

consultation,


dr Laughlin, who also reviewed CT scan.  No vascular surgical intervention 

recommended., he recs treat with anticoagulation if safe to do so, 


Yesterday I discussed with patient, and patient's daughter Olya and son in 

bedside, discussed the risk and benefit of Coumadin for anticoagulation, 

daughter fully understand and agreed to start Coumadin





Accelerated hypertension with history of HTN, blood pressure in normal range 


- Continue triamterene/ HCTZ  PO , add lisinopril 2.5 mg by mouth daily, 

patient has diabetic,  this medicine will benefit for the prevention of 

diabetic kidney disease


- will have hydralazine prn for systolic > 180 





DMII 


 - patient has been of his medications for Dm, blood glucose fairly controlled


- Continue monitor the BSG and if the patient has consistently elevated BSG


- continue sliding scale, check a1c





Parkinson's 


Anxiety


Depression


GIULIA


Gout


The above condition is stable we'll continue current medication





DVT Prophylaxis 


On heparin subcutaneous





d/w'ed pt and daughter of care plan, answer all questions, daughter okay to 

rehabilitation per PT OT evaluation


 talk to patient and daughter already, referral to [facility], 

with the [facility]  as back up 





full code, but dose not want to prolonged life support





Patient discharged today in stable condition





Instructions / Follow-Up


you have Abdominal pain secondary to colonic distention; 


you possible GERD with heartburn , you can continue home Ranitidine , and add 

Maalox as needed for heartburn


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 

started Coumadin for anticoagulation, need to check PT INR  while in rehab 


Per Dr. Laughlin, Vascular surgeon, possible only need 3 month of coumadin, and 

no need Lovenox bridging


[facility] please call to Dr. Laughlin's office to arrange to be seen in 1-2 week





fall precaution





- you need to follow up with your primary care physician in 1 week,


- Follow up your surgeon as instructed


- take medication as instructed, never overdose or any misuse, or take with 

alcohol,   because misuse of medicine may  cause organ damage or death, call 

your primary care physician if have questions of medicaitons.


- call your primary care physician OR go to local emergency room if has any 

fever/chill, chest pain, shortness of breathing, nausea/vomiting/abdominal pain

, facial droop/slurry speech/local weakness, or if has any questions. 


- diet as instructed


- you need to follow up with your subspecialist 


- you should understand that it is important to follow up the above instruction

, and "not following the above instruction" may cause delayed or missed care of 

your medical conditions which may cause permanent organ damage and even death.


Total Time Spent:  Greater than 30 minutes


This includes examination of the patient, discharge planning, medication 

reconciliation, and communication with other providers.





Discharge Instructions


Please refer to the electronic Patient Visit Report (Discharge Instructions) 

for additional information.





Additional Copies To


Abelardo Laughlin M.D.; Johnnie Reis M.D.

## 2017-08-21 ENCOUNTER — HOSPITAL ENCOUNTER (OUTPATIENT)
Dept: HOSPITAL 45 - C.LABCC | Age: 77
Discharge: NURSING, REHAB OR LONG-TERM CARE | End: 2017-08-21
Attending: INTERNAL MEDICINE
Payer: COMMERCIAL

## 2017-08-21 DIAGNOSIS — E88.09: ICD-10-CM

## 2017-08-21 DIAGNOSIS — D64.9: Primary | ICD-10-CM

## 2017-08-21 LAB
ALBUMIN/GLOB SERPL: 0.8 {RATIO} (ref 0.9–2)
ALP SERPL-CCNC: 109 U/L (ref 45–117)
ALT SERPL-CCNC: 23 U/L (ref 12–78)
ANION GAP SERPL CALC-SCNC: 5 MMOL/L (ref 3–11)
AST SERPL-CCNC: 19 U/L (ref 15–37)
BASOPHILS # BLD: 0.02 K/UL (ref 0–0.2)
BASOPHILS NFR BLD: 0.2 %
BUN SERPL-MCNC: 18 MG/DL (ref 7–18)
BUN/CREAT SERPL: 16.2 (ref 10–20)
CALCIUM SERPL-MCNC: 9.3 MG/DL (ref 8.5–10.1)
CHLORIDE SERPL-SCNC: 101 MMOL/L (ref 98–107)
CO2 SERPL-SCNC: 30 MMOL/L (ref 21–32)
COMPLETE: YES
CREAT SERPL-MCNC: 1.1 MG/DL (ref 0.6–1.4)
EOSINOPHIL NFR BLD AUTO: 347 K/UL (ref 130–400)
GLOBULIN SER-MCNC: 4.1 GM/DL (ref 2.5–4)
GLUCOSE SERPL-MCNC: 125 MG/DL (ref 70–99)
HCT VFR BLD CALC: 38.7 % (ref 42–52)
IG%: 0.6 %
IMM GRANULOCYTES NFR BLD AUTO: 20.3 %
INR PPP: 1.9 (ref 0.9–1.1)
LYMPHOCYTES # BLD: 2.13 K/UL (ref 1.2–3.4)
MAGNESIUM SERPL-MCNC: 2.2 MG/DL (ref 1.8–2.4)
MCH RBC QN AUTO: 30.3 PG (ref 25–34)
MCHC RBC AUTO-ENTMCNC: 31.5 G/DL (ref 32–36)
MCV RBC AUTO: 96.3 FL (ref 80–100)
MONOCYTES NFR BLD: 12.4 %
NEUTROPHILS # BLD AUTO: 1.2 %
NEUTROPHILS NFR BLD AUTO: 65.3 %
PMV BLD AUTO: 10.3 FL (ref 7.4–10.4)
POTASSIUM SERPL-SCNC: 3.9 MMOL/L (ref 3.5–5.1)
PREALB SERPL-MCNC: 20 MG/DL (ref 20–40)
PROTHROMBIN TIME: 21.4 SECONDS (ref 9–12)
RBC # BLD AUTO: 4.02 M/UL (ref 4.7–6.1)
SODIUM SERPL-SCNC: 136 MMOL/L (ref 136–145)
WBC # BLD AUTO: 10.5 K/UL (ref 4.8–10.8)

## 2017-08-22 NOTE — EDITING REQUIRED CODING QUERY
Your help is needed for correct coding of this account; please clarify if the patients 
postoperative ileus documented on the discharge instructions and Dr. Donato's consult 
reports was:



( ) expected out of the surgery



( ) unexpected complication from the surgery



( x) other please specify, may be  "expected out of the surgery"





Thank you for your time,

 

YANDEL Barrow, CPC

## 2017-08-25 ENCOUNTER — HOSPITAL ENCOUNTER (OUTPATIENT)
Dept: HOSPITAL 45 - C.LABCC | Age: 77
Discharge: NURSING, REHAB OR LONG-TERM CARE | End: 2017-08-25
Attending: INTERNAL MEDICINE
Payer: COMMERCIAL

## 2017-08-25 DIAGNOSIS — I48.91: Primary | ICD-10-CM

## 2017-08-25 LAB
INR PPP: 2.2 (ref 0.9–1.1)
PROTHROMBIN TIME: 24.2 SECONDS (ref 9–12)

## 2017-08-31 ENCOUNTER — HOSPITAL ENCOUNTER (INPATIENT)
Dept: HOSPITAL 45 - C.EDC | Age: 77
LOS: 7 days | Discharge: NURSING, REHAB OR LONG-TERM CARE | DRG: 70 | End: 2017-09-07
Attending: HOSPITALIST | Admitting: HOSPITALIST
Payer: COMMERCIAL

## 2017-08-31 VITALS
TEMPERATURE: 98.96 F | DIASTOLIC BLOOD PRESSURE: 68 MMHG | SYSTOLIC BLOOD PRESSURE: 120 MMHG | OXYGEN SATURATION: 93 % | HEART RATE: 80 BPM

## 2017-08-31 VITALS
HEART RATE: 79 BPM | SYSTOLIC BLOOD PRESSURE: 128 MMHG | TEMPERATURE: 98.42 F | DIASTOLIC BLOOD PRESSURE: 69 MMHG | OXYGEN SATURATION: 91 %

## 2017-08-31 VITALS
WEIGHT: 209.44 LBS | BODY MASS INDEX: 28.37 KG/M2 | WEIGHT: 209.44 LBS | HEIGHT: 72 IN | BODY MASS INDEX: 28.37 KG/M2 | HEIGHT: 72 IN

## 2017-08-31 DIAGNOSIS — S22.42XA: ICD-10-CM

## 2017-08-31 DIAGNOSIS — G93.40: Primary | ICD-10-CM

## 2017-08-31 DIAGNOSIS — K55.069: ICD-10-CM

## 2017-08-31 DIAGNOSIS — M10.9: ICD-10-CM

## 2017-08-31 DIAGNOSIS — G52.1: ICD-10-CM

## 2017-08-31 DIAGNOSIS — Z80.3: ICD-10-CM

## 2017-08-31 DIAGNOSIS — G47.33: ICD-10-CM

## 2017-08-31 DIAGNOSIS — Z90.49: ICD-10-CM

## 2017-08-31 DIAGNOSIS — Z79.01: ICD-10-CM

## 2017-08-31 DIAGNOSIS — G20: ICD-10-CM

## 2017-08-31 DIAGNOSIS — I10: ICD-10-CM

## 2017-08-31 DIAGNOSIS — Z83.3: ICD-10-CM
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
## 2017-08-31 DIAGNOSIS — K21.9: ICD-10-CM

## 2017-08-31 LAB
ALBUMIN/GLOB SERPL: 0.8 {RATIO} (ref 0.9–2)
ALP SERPL-CCNC: 101 U/L (ref 45–117)
ALT SERPL-CCNC: 7 U/L (ref 12–78)
ANION GAP SERPL CALC-SCNC: 7 MMOL/L (ref 3–11)
APPEARANCE UR: CLEAR
AST SERPL-CCNC: 14 U/L (ref 15–37)
BASOPHILS # BLD: 0.02 K/UL (ref 0–0.2)
BASOPHILS NFR BLD: 0.2 %
BILIRUB UR-MCNC: (no result) MG/DL
BUN SERPL-MCNC: 14 MG/DL (ref 7–18)
BUN/CREAT SERPL: 13 (ref 10–20)
CALCIUM SERPL-MCNC: 9.1 MG/DL (ref 8.5–10.1)
CHLORIDE SERPL-SCNC: 101 MMOL/L (ref 98–107)
CKMB/CK RATIO: 0.9 (ref 0–3)
CO2 SERPL-SCNC: 25 MMOL/L (ref 21–32)
COLOR UR: YELLOW
COMPLETE: YES
CREAT CL PREDICTED SERPL C-G-VRATE: 70 ML/MIN
CREAT SERPL-MCNC: 1.1 MG/DL (ref 0.6–1.4)
EOSINOPHIL NFR BLD AUTO: 273 K/UL (ref 130–400)
GLOBULIN SER-MCNC: 3.9 GM/DL (ref 2.5–4)
GLUCOSE SERPL-MCNC: 110 MG/DL (ref 70–99)
HCT VFR BLD CALC: 34 % (ref 42–52)
IG%: 0.5 %
IMM GRANULOCYTES NFR BLD AUTO: 16.8 %
INR PPP: 3.4 (ref 0.9–1.1)
LYMPHOCYTES # BLD: 1.86 K/UL (ref 1.2–3.4)
MANUAL MICROSCOPIC REQUIRED?: NO
MCH RBC QN AUTO: 30.7 PG (ref 25–34)
MCHC RBC AUTO-ENTMCNC: 33.5 G/DL (ref 32–36)
MCV RBC AUTO: 91.6 FL (ref 80–100)
MONOCYTES NFR BLD: 14.8 %
NEUTROPHILS # BLD AUTO: 1.4 %
NEUTROPHILS NFR BLD AUTO: 66.3 %
NITRITE UR QL STRIP: (no result)
PARTIAL THROMBOPLASTIN RATIO: 1.7
PH UR STRIP: 6 [PH] (ref 4.5–7.5)
PMV BLD AUTO: 10.3 FL (ref 7.4–10.4)
POTASSIUM SERPL-SCNC: 4.1 MMOL/L (ref 3.5–5.1)
PROTHROMBIN TIME: 38.4 SECONDS (ref 9–12)
RBC # BLD AUTO: 3.71 M/UL (ref 4.7–6.1)
REVIEW REQ?: NO
SODIUM SERPL-SCNC: 133 MMOL/L (ref 136–145)
SP GR UR STRIP: 1.02 (ref 1–1.03)
URINE BILL WITH OR WITHOUT MIC: (no result)
UROBILINOGEN UR-MCNC: (no result) MG/DL
WBC # BLD AUTO: 11.05 K/UL (ref 4.8–10.8)
ZZURINE CULT IF INDIC CATH: NO

## 2017-08-31 RX ADMIN — GABAPENTIN SCH MG: 300 CAPSULE ORAL at 21:00

## 2017-08-31 RX ADMIN — CARBIDOPA AND LEVODOPA SCH TAB: 25; 100 TABLET ORAL at 21:00

## 2017-08-31 RX ADMIN — Medication SCH EA: at 21:00

## 2017-08-31 RX ADMIN — SODIUM CHLORIDE AND POTASSIUM CHLORIDE SCH MLS/HR: 9; 1.49 INJECTION, SOLUTION INTRAVENOUS at 20:00

## 2017-08-31 RX ADMIN — FAMOTIDINE SCH MLS/HR: 10 INJECTION INTRAVENOUS at 20:00

## 2017-08-31 NOTE — DIAGNOSTIC IMAGING REPORT
CHEST ONE VIEW PORTABLE



CLINICAL HISTORY: Altered mental status.    



COMPARISON STUDY:  Chest radiograph August 13, 2017.



FINDINGS: Mild left basilar opacity favors atelectasis. Lung volumes are

diminished. Mild cardiomegaly is unchanged. There is no evidence of pulmonary

edema. No pneumothorax is identified. Exam is compromised by motion artifact.

There are possible small bilateral pleural effusions. Prominent upper abdominal

bowel loop may reflect a distended colonic loop, as shown on prior studies.



IMPRESSION:  



1. Possible small bilateral pleural effusions.



2. Diminished lung volumes with left basilar atelectasis.



3. Suspected colonic distention within visualized portions of the upper abdomen.

This is partially imaged on this exam and was shown on prior abdominal CT of

August 13, 2017.







Electronically signed by:  Roge Diaz M.D.

8/31/2017 2:00 PM



Dictated Date/Time:  8/31/2017 1:57 PM

## 2017-08-31 NOTE — SURGERY CONSULTATION
Consultation


Date of Consultation:


Aug 31, 2017.


Attending Physician:





History of Present Illness


Jaren Chaudhari is a 77 year old man with history of sigmoid volvulus s/p 

resection with anastomosis by Dr. Donato.  He was subsequently found to have 

a small fluid collection adjacent to the anastomosis, which was contained and 

was managed non operatively.  He was seen in clinic by Dr. Donato yesterday, 

at which time he was doing well.  Per his daughter, patient started acting 

abnormally yesterday afternoon - was "twitching" more than usual, and acting 

confused.  He fell in the evening, and was subsequently brought to the ED today 

for evaluation.  A CT scan was completed of his head, chest, abdomen and pelvis

; he was found to have multiple rib fractures on the left side without 

associated hemo/pnuemothorax.  The fluid collection adjacent to the anastomosis 

was again seen - a bit smaller in size, but now with air bubbles in the 

collection, new from prior.  Per patient's daughter, he has not complained of 

any abdominal pain.  Has been eating normally, no nausea or vomiting reported.  

Having BMs.  No recent fever, chills, cough, chest pain, N/V, urinary 

abnormalities, melena / hematochezia.  On exam, patient denies abdominal pain; 

abdomen is soft, non distended, non tender to palpation.





Past Medical/Surgical History


Medical Problems:


(1) Abdominal distension


Status: Acute  





(2) Rhabdomyolysis


Status: Acute  





(3) Sigmoid volvulus


Status: Acute  





(4) Vomiting


Status: Acute  





(5) Weakness


Status: Acute  





Social History Problems:


(1) Status post partial colectomy


Status: Acute  











Family History





Diabetes mellitus


FH: breast cancer





Social History


Smoking Status:  Never Smoker


Drug Use:  none


Marital Status:  , 


Housing Status:  lives with significant other


Occupation Status:  retired





Allergies


Coded Allergies:  


     No Known Allergies (Verified , 8/31/17)





Home Medications


Scheduled


Allopurinol (Zyloprim), 300 MG PO DAILY


Carbidopa/Levodopa (Sinemet 25MG/100MG), 1.5 TAB PO TID


Cholecalciferol (Vitamin D3), 2,000 UNITS PO DAILY


Cyanocobalamin (Vitamin B12), 1,000 MCG PO DAILY


Gabapentin (Neurontin), 300 MG PO QID


Lisinopril (Lisinopril), 2.5 MG PO QAM


Ranitidine (Zantac), 150 MG PO BID


Sertraline (Zoloft), 50 MG PO DAILY


Triamterene/Hctz (Triamterene/Hctz 37.5-25MG), 0.5 TAB PO DAILY


Warfarin Sod (Coumadin), 5 MG PO DAILY@1600





Scheduled PRN


Clonazepam (Klonopin), 0.25 MG PO HS PRN for Sleep


Miconazole Nitrate (Desenex Shake Powder), 1 APPLN EXT BID PRN for Affected 

Skin Folds


Ondansetron Hcl (Zofran), 4 MG PO q6hrs PRN for Nausea


Polyethylene Glycol 3350 (Bulk (Polyethylene Glycol 3350), 17 GM PO DAILY PRN 

for Constipation





Miscellaneous Medications


Acetaminophen (Tylenol), 650 MG PO





Current Inpatient Medications





Current Inpatient Medications








 Medications


  (Trade)  Dose


 Ordered  Sig/Kalpesh


 Route  Start Time


 Stop Time Status Last Admin


Dose Admin


 


 Sodium Chloride  1,000 ml @ 


 200 mls/hr  Q5H


 IV  8/31/17 13:14


 9/30/17 13:13  8/31/17 14:02


200 MLS/HR


 


 Ioversol


  (Optiray 320)  125 ml  UD  PRN


 IV  8/31/17 15:00


 9/4/17 14:59   


 


 


 Acetaminophen


  (Tylenol Tab)  650 mg  Q4H  PRN


 PO  8/31/17 18:00


 9/30/17 17:59   


 


 


 Carbidopa/Levodopa


  (Sinemet 25/


 100MG Tab)  1.5 tab  TID


 PO  8/31/17 21:00


 9/30/17 20:59   


 


 


 Gabapentin


  (Neurontin Cap)  300 mg  QID


 PO  8/31/17 21:00


 9/30/17 20:59   


 


 


 Miconazole Nitrate


  (Desenex Powder)  1 appln  BID  PRN


 EXT  8/31/17 18:00


 9/30/17 17:59   


 


 


 Sertraline HCl


  (Zoloft Tab)  50 mg  DAILY


 PO  9/1/17 09:00


 10/1/17 08:59   


 


 


 Warfarin Sodium


  (Coumadin Tab)  5 mg  DAILY@1600


 PO  9/1/17 16:00


 10/1/17 15:59   


 


 


 Famotidine 20 mg/


 Dextrose  102 ml @ 


 200 mls/hr  Q12H


 IV  8/31/17 18:00


 9/30/17 17:59 UNV  


 


 


 Ondansetron HCl


  (Zofran Inj)  4 mg  Q6H  PRN


 IV  8/31/17 18:00


 9/30/17 17:59   


 


 


 Acetaminophen  100 ml @ 


 400 mls/hr  Q8H  PRN


 IV  8/31/17 18:00


 9/30/17 17:59   


 


 


 Potassium


 Chloride/Sodium


 Chloride  1,000 ml @ 


 100 mls/hr  Q10H


 IV  8/31/17 17:57


 9/30/17 17:56 UNV  


 


 


 Lidocaine


  (Lidoderm Patch


 5%)  1 patch  QAM


 TD  9/1/17 09:00


 10/1/17 08:59   


 


 


 Miscellaneous


  (Remove Lidoderm


 Patch)  1 ea  DAILY@21


 N/A  8/31/17 21:00


 9/30/17 20:59   


 


 


 Diclofenac Sodium


  (Voltaren 1% Top


 Gel)  1 appln  QID  PRN


 EXT  8/31/17 18:00


 9/30/17 17:59   


 











Review of Systems


Constitutional:  No fever, No chills


Respiratory:  No cough, No shortness of breath


Cardiovascular:  No chest pain


Abdomen:  No pain, No nausea, No vomiting, No diarrhea, No constipation, No GI 

bleeding


Neurologic:  + problem reported (confusion, twitching)





Physical Exam











  Date Time  Temp Pulse Resp B/P (MAP) Pulse Ox O2 Delivery O2 Flow Rate FiO2


 


8/31/17 18:08  83 27  90   


 


8/31/17 18:01    126/89    


 


8/31/17 17:38  80 17  91   


 


8/31/17 17:31    102/65    


 


8/31/17 17:24  85      


 


8/31/17 17:08  84 24  98   


 


8/31/17 17:02    134/59    


 


8/31/17 16:38  77 24     


 


8/31/17 16:33     94 Nasal Cannula 2.0 


 


8/31/17 16:33    115/80    


 


8/31/17 16:31  81 21  88   


 


8/31/17 16:01  77 23 129/67 93   


 


8/31/17 14:56  98 24 115/68 96 Nasal Cannula 2.0 


 


8/31/17 13:48    116/72    


 


8/31/17 13:43  80 21  96   


 


8/31/17 13:31    119/64    


 


8/31/17 13:28 37.1 86 24 138/67 85 Room Air  


 


8/31/17 13:28  82 21  95   


 


8/31/17 13:21     95 Nasal Cannula 2.0 


 


8/31/17 13:19  82      


 


8/31/17 13:05    138/67    








General Appearance:  WD/WN, no apparent distress


Head:  normocephalic


Neck:  supple


Respiratory/Chest:  lungs clear, normal breath sounds, no respiratory distress


Cardiovascular:  regular rate, rhythm


Abdomen/GI:  normal bowel sounds, non tender, soft, no organomegaly


Neurologic/Psych:  alert, + pertinent finding (intermittent twitching of 

extremities noted, listing to right side)


Skin:  normal color, warm/dry, no rash





Laboratory Results





Last 24 Hours








Test


  8/31/17


12:45 8/31/17


13:14 8/31/17


13:45 8/31/17


16:33


 


White Blood Count 11.05 K/uL    


 


Red Blood Count 3.71 M/uL    


 


Hemoglobin 11.4 g/dL    


 


Hematocrit 34.0 %    


 


Mean Corpuscular Volume 91.6 fL    


 


Mean Corpuscular Hemoglobin 30.7 pg    


 


Mean Corpuscular Hemoglobin


Concent 33.5 g/dl 


  


  


  


 


 


Platelet Count 273 K/uL    


 


Mean Platelet Volume 10.3 fL    


 


Neutrophils (%) (Auto) 66.3 %    


 


Lymphocytes (%) (Auto) 16.8 %    


 


Monocytes (%) (Auto) 14.8 %    


 


Eosinophils (%) (Auto) 1.4 %    


 


Basophils (%) (Auto) 0.2 %    


 


Neutrophils # (Auto) 7.33 K/uL    


 


Lymphocytes # (Auto) 1.86 K/uL    


 


Monocytes # (Auto) 1.64 K/uL    


 


Eosinophils # (Auto) 0.15 K/uL    


 


Basophils # (Auto) 0.02 K/uL    


 


RDW Standard Deviation 46.2 fL    


 


RDW Coefficient of Variation 13.8 %    


 


Immature Granulocyte % (Auto) 0.5 %    


 


Immature Granulocyte # (Auto) 0.05 K/uL    


 


Prothrombin Time 38.4 SECONDS    


 


Prothromb Time International


Ratio 3.4 


  


  


  


 


 


Activated Partial


Thromboplast Time 44.1 SECONDS 


  


  


  


 


 


Partial Thromboplastin Ratio 1.7    


 


Sodium Level 133 mmol/L    


 


Potassium Level 4.1 mmol/L    


 


Chloride Level 101 mmol/L    


 


Carbon Dioxide Level 25 mmol/L    


 


Anion Gap 7.0 mmol/L    


 


Blood Urea Nitrogen 14 mg/dl    


 


Creatinine 1.10 mg/dl    


 


Est Creatinine Clear Calc


Drug Dose 70.0 ml/min 


  


  


  


 


 


Estimated GFR (


American) 75.2 


  


  


  


 


 


Estimated GFR (Non-


American 64.9 


  


  


  


 


 


BUN/Creatinine Ratio 13.0    


 


Random Glucose 110 mg/dl    


 


Calcium Level 9.1 mg/dl    


 


Total Bilirubin 0.8 mg/dl    


 


Aspartate Amino Transf


(AST/SGOT) 14 U/L 


  


  


  


 


 


Alanine Aminotransferase


(ALT/SGPT) 7 U/L 


  


  


  


 


 


Alkaline Phosphatase 101 U/L    


 


Total Creatine Kinase 147 U/L    


 


Creatine Kinase MB 1.3 ng/ml    


 


Creatine Kinase MB Ratio 0.9     


 


Troponin I < 0.015 ng/ml    


 


Total Protein 7.2 gm/dl    


 


Albumin 3.3 gm/dl    


 


Globulin 3.9 gm/dl    


 


Albumin/Globulin Ratio 0.8    


 


Urine Color   YELLOW  


 


Urine Appearance   CLEAR  


 


Urine pH   6.0  


 


Urine Specific Gravity   1.018  


 


Urine Protein   NEG  


 


Urine Glucose (UA)   NEG  


 


Urine Ketones   NEG  


 


Urine Occult Blood   NEG  


 


Urine Nitrite   NEG  


 


Urine Bilirubin   NEG  


 


Urine Urobilinogen   NEG  


 


Urine Leukocyte Esterase   NEG  


 


Lactic Acid Level    1.1 mmol/L 











8/31/17 CXR:


IMPRESSION:  


1. Possible small bilateral pleural effusions.


2. Diminished lung volumes with left basilar atelectasis.


3. Suspected colonic distention within visualized portions of the upper 

abdomen. This is partially imaged on this exam and was shown on prior abdominal 

CT of August 13, 2017.





8/31/17 CT Head:


Impression:


Motion artifact. No definite acute intracranial abnormality. Consider repeat 

study if the patient symptoms continue to progress. 





8/31/17 CT Chest:


IMPRESSION:  


1. Acute mildly displaced fractures of the lateral left fifth and seventh ribs 

and acute nondisplaced fracture of the lateral left sixth rib. No pneumothorax.


Small left pleural effusion may reflect a small hemothorax given the rib 

fractures.


2. Subpleural and ground glass opacities which favor atelectasis. An infectious 

process is considered less likely.


3. No additional acute traumatic findings within the chest.





8/31/17 CT Abdomen / Pelvis:


IMPRESSION:


1.  No evidence of acute intra-abdominal injury.


2.  Postsurgical changes of sigmoidectomy with interval decrease in size of the 

gas and fluid containing collection immediately superior to the anastomosis. 

This fluid collection remains concerning for an abscess secondary to 

anastomotic leak, especially given the presence of gas, which is new from prior.


3.  Interval resolution of previously demonstrated filling defect within a 

branch of the superior mesenteric vein.


4.  Persistent colonic distention with stool and gas, suggesting colonic ileus.





Assessment & Plan


Jaren Chaudhari is a 77 year old man with history of sigmoid volvulus s/p 

resection and anastomosis who presents to the ED with confusion and a recent 

fall at home.  CT head negative for acute abnormalities.  Found to have 

multiple rib fractures on the left side.  Also found to have a persistent, 

contained fluid collection adjacent to the anastomosis (seen on prior imaging a 

few weeks ago, now fluid collection is a bit smaller but contains air, which 

was not present before).  Also has signs of an ileus with distended loops of 

bowel.  Collection seems to be contained, and does not seem to be causing his 

current issues.  He is afebrile, vitals stable on 2L O2 via NC.  No 

leukocytosis - labs are within normal limits.  





-No acute surgical intervention indicated for the fluid collection adjacent to 

anastomosis; appears well-contained at this time.


-Recommend admission to medicine for further workup of confusion


-Will need aggressive respiratory therapy and pain control given rib fractures


-NPO, IVF hydration, replete electrolytes prn; non operative management of 

ileus 


-Surgery will continue to follow


-Patient will be discussed with Dr. Donato in the morning.





Mary Neal MD


8/31/17

## 2017-08-31 NOTE — DIAGNOSTIC IMAGING REPORT
CT OF THE CHEST WITH IV CONTRAST



CLINICAL HISTORY: Left-sided chest and abdominal pain following injury.    



COMPARISON STUDY:  Chest radiograph performed earlier today and chest CT

September 19, 2014. 



TECHNIQUE:  Following IV administration of 94 mL of Optiray-320, helical axial

images of the chest were obtained.  Sagittal and coronal reconstructions were

viewed as well as maximal intensity projections on an independent 3-D

workstation.  A dose lowering technique was utilized adhering to the principles

of ALARA.





CT DOSE: 2165.58 mGy.cm



FINDINGS:  There is no evidence of traumatic injury to the thoracic aorta. The

heart is moderately enlarged. No enlarged thoracic lymph nodes are identified.

There is no pneumothorax. A small left pleural effusion is present. Linear and

groundglass opacities within lungs favor atelectasis. No acute thoracic spine

fracture is present. There are acute minimally displaced fractures of the left

fifth and seventh ribs as well as a nondisplaced fracture of the lateral left

sixth rib. These are acute. There are additional old left-sided rib fractures.

Calcified right lower lobe granuloma is present. Lungs are suboptimally assessed

due to respiratory motion. 



IMPRESSION:  



1. Acute mildly displaced fractures of the lateral left fifth and seventh ribs

and acute nondisplaced fracture of the lateral left sixth rib. No pneumothorax.

Small left pleural effusion may reflect a small hemothorax given the rib

fractures.



2. Subpleural and ground glass opacities which favor atelectasis. An infectious

process is considered less likely.



3. No additional acute traumatic findings within the chest.







Electronically signed by:  Roge Diaz M.D.

8/31/2017 4:03 PM



Dictated Date/Time:  8/31/2017 3:53 PM

## 2017-08-31 NOTE — EMERGENCY ROOM VISIT NOTE
ED Visit Note


First contact with patient:  13:05


I did evaluate and examine this patient myself.  I did guide management for the 

patient. I agree with the APC's assessment as discussed.  Please see the APC's 

dictation for further details.  I did independently review the x-rays, twelve-

lead EKG, CAT scan and blood work.  The patient will be hospitalized for 

further evaluation.

## 2017-08-31 NOTE — DIAGNOSTIC IMAGING REPORT
HEAD CT NONCONTRAST



CT DOSE: 1074.96 mGy.cm



HISTORY:      Altered mental status. Intermittent right-sided weakness.



TECHNIQUE: Multiaxial CT images of the head were performed without the use of

intravenous contrast. Automated exposure control was utilized for this study.  A

dose lowering technique was utilized adhering to the principles of ALARA.



Comparison: Head CT 2/21/2017.



Findings: Suboptimal evaluation the brain due to the motion artifact. The

calvarium and skull base are intact. There is no mass, hematoma, midline shift,

acute infarct. White matter hypodensity is nonspecific but suggestive of

microvascular ischemic change. The ventricles and sulci demonstrate mild

age-related involutional changes.



Impression:

Motion artifact. No definite acute intracranial abnormality. Consider repeat

study if the patient symptoms continue to progress. 







Electronically signed by:  Wale Sultana M.D.

8/31/2017 2:26 PM



Dictated Date/Time:  8/31/2017 2:21 PM

## 2017-08-31 NOTE — EMERGENCY ROOM VISIT NOTE
History


First contact with patient:  13:05


Chief Complaint:  ILLNESS


Stated Complaint:  ALTERED MENTAL STATUS, DEHYDRATION





History of Present Illness


The patient is a 77 year old male who presents to the Emergency Room for 

evaluation of altered mental status.  The patient is accompanied by his son and 

daughter who provide most of the history.  Evidently the patient has a history 

of sigmoid volvulus with colectomy and reanastomosis one month ago.  He has 

been doing fairly well at home and recently was discharged from a nursing home 

about 5 days ago.  He followed with his family doctor 3 days ago and with his 

surgeon 2 days ago, and he was doing well.  The family indicates that around 1:

30 AM the patient had a fall at home, which was not typical for him.  He was 

acting confused and was not able to operate a flashlight that he has had for 

several years.  He was slurring the names of his children and they had 

difficulty understanding him.  This occurred around 8 AM, roughly 5 hours ago.  

The patient has been complaining of left-sided rib pain after the fall.  He has 

a dry persistent cough.  There is concern for a recent history of urosepsis, 

although the patient himself has not had fever or chills.  The patient's 

discomfort is currently rated a 7/10.





Review of Systems


More than 10 systems were reviewed and otherwise negative with the exception of 

history of present illness.





Past Medical/Surgical History


Medical Problems:


(1) abd pain, mesenteric venous thrombosis


(2) Altered mental state


(3) BENIGN HYPERTENSION


(4) Dehydration


(5) DIAB W KETOACIDOSIS, TYPE II OR UNSPEC TYPE, UNCONTROLLED


(6) DIVERTICULOSIS COLON (W/O MENT OF HEMORRHAGE)


(7) GOUT NOS


(8) IDIO PERIPH NEURPTHY NOS


(9) Ileus following gastrointestinal surgery


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


(11) MIXED HYPERLIPIDEMIA


(12) Volvulus








Family History





Diabetes mellitus


FH: breast cancer





Social History


Smoking Status:  Former Smoker


Smokeless Tobacco Use:  No


Alcohol Use:  none


Drug Use:  none


Marital Status:  , 


Housing Status:  lives with significant other


Occupation Status:  retired





Current/Historical Medications


Scheduled


Allopurinol (Zyloprim), 300 MG PO DAILY


Carbidopa/Levodopa (Sinemet 25MG/100MG), 1.5 TAB PO TID


Cholecalciferol (Vitamin D3), 2,000 UNITS PO DAILY


Cyanocobalamin (Vitamin B12), 1,000 MCG PO DAILY


Gabapentin (Neurontin), 300 MG PO QID


Lisinopril (Lisinopril), 2.5 MG PO QAM


Ranitidine (Zantac), 150 MG PO BID


Sertraline (Zoloft), 50 MG PO DAILY


Triamterene/Hctz (Triamterene/Hctz 37.5-25MG), 0.5 TAB PO DAILY


Warfarin Sod (Coumadin), 5 MG PO DAILY@1600





Scheduled PRN


Clonazepam (Klonopin), 0.25 MG PO HS PRN for Sleep


Miconazole Nitrate (Desenex Shake Powder), 1 APPLN EXT BID PRN for Affected 

Skin Folds


Ondansetron Hcl (Zofran), 4 MG PO q6hrs PRN for Nausea


Polyethylene Glycol 3350 (Bulk (Polyethylene Glycol 3350), 17 GM PO DAILY PRN 

for Constipation





Miscellaneous Medications


Acetaminophen (Tylenol), 650 MG PO





Physical Exam


Vital Signs











  Date Time  Temp Pulse Resp B/P (MAP) Pulse Ox O2 Delivery O2 Flow Rate FiO2


 


8/31/17 18:01    126/89    


 


8/31/17 17:38  80 17  91   


 


8/31/17 17:31    102/65    


 


8/31/17 17:24  85      


 


8/31/17 17:08  84 24  98   


 


8/31/17 17:02    134/59    


 


8/31/17 16:38  77 24     


 


8/31/17 16:33     94 Nasal Cannula 2.0 


 


8/31/17 16:33    115/80    


 


8/31/17 16:31  81 21  88   


 


8/31/17 16:01  77 23 129/67 93   


 


8/31/17 14:56  98 24 115/68 96 Nasal Cannula 2.0 


 


8/31/17 13:48    116/72    


 


8/31/17 13:43  80 21  96   


 


8/31/17 13:31    119/64    


 


8/31/17 13:28 37.1 86 24 138/67 85 Room Air  


 


8/31/17 13:28  82 21  95   


 


8/31/17 13:21     95 Nasal Cannula 2.0 


 


8/31/17 13:19  82      


 


8/31/17 13:05    138/67    








Pain Rating (0-10):  0





Physical Exam


VITALS: Vitals are noted on the nurse's note and reviewed by myself.  Vital 

signs stable.


GENERAL: Chronically ill-appearing white male who is resting comfortably in his 

emergency Department bed.  He is able to answer simple questions.  He does have 

some minimal slurring of speech without obvious facial drooping 


NECK: Supple without nuchal rigidity.  No lymphadenopathy.  No thyromegaly.  

Cervical spine is nontender.  


HEART: Regular rate and rhythm with systolic murmur


LUNGS: Poor respiratory effort with mild crackles in the bibasilar regions.  

There is tenderness along the left side lower ribs roughly to the fourth 

through eighth rib distributions.


ABDOMEN: Positive normal bowel sounds x 4.  Soft, nontender, without masses or 

organomegaly.  No guarding or rebound tenderness.


MUSCULOSKELETAL: No muscle atrophy, erythema, or edema noted.  Full range of 

motion without joint tenderness in all extremities.  


NEURO: Patient was alert and oriented to person place and time. CN II through 

XII grossly intact 


SKIN: Left buttocks decubitus ulcer appreciated





Medical Decision & Procedures


ER Provider


Diagnostic Interpretation:











HEAD CT NONCONTRAST





CT DOSE: 1074.96 mGy.cm





HISTORY:      Altered mental status. Intermittent right-sided weakness.





TECHNIQUE: Multiaxial CT images of the head were performed without the use of


intravenous contrast. Automated exposure control was utilized for this study.  A


dose lowering technique was utilized adhering to the principles of ALARA.





Comparison: Head CT 2/21/2017.





Findings: Suboptimal evaluation the brain due to the motion artifact. The


calvarium and skull base are intact. There is no mass, hematoma, midline shift,


acute infarct. White matter hypodensity is nonspecific but suggestive of


microvascular ischemic change. The ventricles and sulci demonstrate mild


age-related involutional changes.





Impression:


Motion artifact. No definite acute intracranial abnormality. Consider repeat


study if the patient symptoms continue to progress. 














CT OF THE CHEST WITH IV CONTRAST





CLINICAL HISTORY: Left-sided chest and abdominal pain following injury.    





COMPARISON STUDY:  Chest radiograph performed earlier today and chest CT


September 19, 2014. 





TECHNIQUE:  Following IV administration of 94 mL of Optiray-320, helical axial


images of the chest were obtained.  Sagittal and coronal reconstructions were


viewed as well as maximal intensity projections on an independent 3-D


workstation.  A dose lowering technique was utilized adhering to the principles


of ALARA.








CT DOSE: 2165.58 mGy.cm





FINDINGS:  There is no evidence of traumatic injury to the thoracic aorta. The


heart is moderately enlarged. No enlarged thoracic lymph nodes are identified.


There is no pneumothorax. A small left pleural effusion is present. Linear and


groundglass opacities within lungs favor atelectasis. No acute thoracic spine


fracture is present. There are acute minimally displaced fractures of the left


fifth and seventh ribs as well as a nondisplaced fracture of the lateral left


sixth rib. These are acute. There are additional old left-sided rib fractures.


Calcified right lower lobe granuloma is present. Lungs are suboptimally assessed


due to respiratory motion. 





IMPRESSION:  





1. Acute mildly displaced fractures of the lateral left fifth and seventh ribs


and acute nondisplaced fracture of the lateral left sixth rib. No pneumothorax.


Small left pleural effusion may reflect a small hemothorax given the rib


fractures.





2. Subpleural and ground glass opacities which favor atelectasis. An infectious


process is considered less likely.





3. No additional acute traumatic findings within the chest.














ABD/PELVIS IV CONTRAST ONLY





CLINICAL HISTORY: 77 years-old Male presenting with left side chest/abd injury. 





TECHNIQUE: Multidetector CT of the abdomen and pelvis was performed after the


administration of intravenous contrast. IV contrast: 94 mL of Optiray 320. A


dose lowering technique was used consistent with the principles of ALARA (as low


as reasonably achievable). 





COMPARISON: 8/13/2017.





CT DOSE (mGy.cm): The estimated cumulative dose is 2165.58 inclusive of the CT


head, CT chest.





FINDINGS:





The examination is slightly degraded by motion artifact, which decreases image


quality and diagnostic sensitivity.





 topogram: Gaseous distention of bowel.





Lung bases: Extensive bibasilar consolidation increased from prior. Small left


pleural effusion. Multichamber enlargement of the heart. Aortic valve


atherosclerosis. No pericardial effusion.





Liver: Normal morphology. No liver lesion. Patent hepatic vasculature.





Biliary: No intrahepatic or extrahepatic biliary ductal dilatation. Normal


gallbladder.





Pancreas: Mild parenchymal atrophy. Duodenal diverticulum noted in the region of


the pancreatic head.





Spleen: Parenchymal calcification, possibly suggesting old granulomatous


disease.





Adrenal glands: Nonspecific nodular thickening of the adrenal glands.





Kidneys and ureters: Exophytic 8 cm simple appearing left renal cyst with


lobular extension into the renal sinus. No hydronephrosis. Ureters normal.





Bladder: Mild circumferential bladder wall thickening.





Pelvic organs: Prostate enlargement likely secondary to benign prostatic


hyperplasia.





Bowel: Anastomosis noted in the region of the upper rectum likely indicating


prior sigmoidectomy. Moderate stool burden throughout the colon, which is also


mildly distended with gas. Ileocolic anastomosis at the level of the hepatic


flexure, where there is associated minimal surrounding stranding, possibly


indicating chronic scarring/adhesions. No bowel obstruction. Small bowel is not


significantly distended. Duodenal diverticulum at the level of pancreatic head


suggested. No pneumatosis.





Peritoneal cavity: The previously noted collection immediately superior to the


sigmoid anastomosis has slightly decreased in size now measuring 4.1 x 1.4 cm,


previously 4.4 x 2.4 cm. A small focus of gas is noted within this collection.


Gas within the collection appears new from prior.





Vasculature: Interval opacification of the jejunal vein draining into the


superior mesenteric vein without evidence of thrombus on the current exam.


Atherosclerosis of the normal caliber abdominal aorta.





Lymph nodes: No enlarged lymph nodes in the abdomen or pelvis.





Abdominal wall: Postsurgical changes along the midline ventral abdominal wall.





Musculoskeletal: Degenerative changes of the spine.





IMPRESSION:


1.  No evidence of acute intra-abdominal injury.





2.  Postsurgical changes of sigmoidectomy with interval decrease in size of the


gas and fluid containing collection immediately superior to the anastomosis.


This fluid collection remains concerning for an abscess secondary to anastomotic


leak, especially given the presence of gas, which is new from prior.





3.  Interval resolution of previously demonstrated filling defect within a


branch of the superior mesenteric vein.





4.  Persistent colonic distention with stool and gas, suggesting colonic ileus.














CHEST ONE VIEW PORTABLE





CLINICAL HISTORY: Altered mental status.    





COMPARISON STUDY:  Chest radiograph August 13, 2017.





FINDINGS: Mild left basilar opacity favors atelectasis. Lung volumes are


diminished. Mild cardiomegaly is unchanged. There is no evidence of pulmonary


edema. No pneumothorax is identified. Exam is compromised by motion artifact.


There are possible small bilateral pleural effusions. Prominent upper abdominal


bowel loop may reflect a distended colonic loop, as shown on prior studies.





IMPRESSION:  





1. Possible small bilateral pleural effusions.





2. Diminished lung volumes with left basilar atelectasis.





3. Suspected colonic distention within visualized portions of the upper abdomen.


This is partially imaged on this exam and was shown on prior abdominal CT of


August 13, 2017.











Laboratory Results


8/31/17 12:45








Red Blood Count 3.71, Mean Corpuscular Volume 91.6, Mean Corpuscular Hemoglobin 

30.7, Mean Corpuscular Hemoglobin Concent 33.5, Mean Platelet Volume 10.3, 

Neutrophils (%) (Auto) 66.3, Lymphocytes (%) (Auto) 16.8, Monocytes (%) (Auto) 

14.8, Eosinophils (%) (Auto) 1.4, Basophils (%) (Auto) 0.2, Neutrophils # (Auto

) 7.33, Lymphocytes # (Auto) 1.86, Monocytes # (Auto) 1.64, Eosinophils # (Auto

) 0.15, Basophils # (Auto) 0.02





8/31/17 12:45

















Test


  8/31/17


12:45 8/31/17


13:14 8/31/17


13:45 8/31/17


16:33


 


White Blood Count


  11.05 K/uL


(4.8-10.8) 


  


  


 


 


Red Blood Count


  3.71 M/uL


(4.7-6.1) 


  


  


 


 


Hemoglobin


  11.4 g/dL


(14.0-18.0) 


  


  


 


 


Hematocrit 34.0 % (42-52)    


 


Mean Corpuscular Volume


  91.6 fL


() 


  


  


 


 


Mean Corpuscular Hemoglobin


  30.7 pg


(25-34) 


  


  


 


 


Mean Corpuscular Hemoglobin


Concent 33.5 g/dl


(32-36) 


  


  


 


 


Platelet Count


  273 K/uL


(130-400) 


  


  


 


 


Mean Platelet Volume


  10.3 fL


(7.4-10.4) 


  


  


 


 


Neutrophils (%) (Auto) 66.3 %    


 


Lymphocytes (%) (Auto) 16.8 %    


 


Monocytes (%) (Auto) 14.8 %    


 


Eosinophils (%) (Auto) 1.4 %    


 


Basophils (%) (Auto) 0.2 %    


 


Neutrophils # (Auto)


  7.33 K/uL


(1.4-6.5) 


  


  


 


 


Lymphocytes # (Auto)


  1.86 K/uL


(1.2-3.4) 


  


  


 


 


Monocytes # (Auto)


  1.64 K/uL


(0.11-0.59) 


  


  


 


 


Eosinophils # (Auto)


  0.15 K/uL


(0-0.5) 


  


  


 


 


Basophils # (Auto)


  0.02 K/uL


(0-0.2) 


  


  


 


 


RDW Standard Deviation


  46.2 fL


(36.4-46.3) 


  


  


 


 


RDW Coefficient of Variation


  13.8 %


(11.5-14.5) 


  


  


 


 


Immature Granulocyte % (Auto) 0.5 %    


 


Immature Granulocyte # (Auto)


  0.05 K/uL


(0.00-0.02) 


  


  


 


 


Prothrombin Time


  38.4 SECONDS


(9.0-12.0) 


  


  


 


 


Prothromb Time International


Ratio 3.4 (0.9-1.1) 


  


  


  


 


 


Activated Partial


Thromboplast Time 44.1 SECONDS


(21.0-31.0) 


  


  


 


 


Partial Thromboplastin Ratio 1.7    


 


Anion Gap


  7.0 mmol/L


(3-11) 


  


  


 


 


Est Creatinine Clear Calc


Drug Dose 70.0 ml/min 


  


  


  


 


 


Estimated GFR (


American) 75.2 


  


  


  


 


 


Estimated GFR (Non-


American 64.9 


  


  


  


 


 


BUN/Creatinine Ratio 13.0 (10-20)    


 


Calcium Level


  9.1 mg/dl


(8.5-10.1) 


  


  


 


 


Total Bilirubin


  0.8 mg/dl


(0.2-1) 


  


  


 


 


Aspartate Amino Transf


(AST/SGOT) 14 U/L (15-37) 


  


  


  


 


 


Alanine Aminotransferase


(ALT/SGPT) 7 U/L (12-78) 


  


  


  


 


 


Alkaline Phosphatase


  101 U/L


() 


  


  


 


 


Total Creatine Kinase


  147 U/L


() 


  


  


 


 


Creatine Kinase MB


  1.3 ng/ml


(0.5-3.6) 


  


  


 


 


Troponin I


  < 0.015 ng/ml


(0-0.045) 


  


  


 


 


Total Protein


  7.2 gm/dl


(6.4-8.2) 


  


  


 


 


Albumin


  3.3 gm/dl


(3.4-5.0) 


  


  


 


 


Globulin


  3.9 gm/dl


(2.5-4.0) 


  


  


 


 


Albumin/Globulin Ratio 0.8 (0.9-2)    


 


Creatine Kinase MB Ratio   (0-3.0)   


 


Urine Color   YELLOW  


 


Urine Appearance   CLEAR (CLEAR)  


 


Urine pH   6.0 (4.5-7.5)  


 


Urine Specific Gravity


  


  


  1.018


(1.000-1.030) 


 


 


Urine Protein   NEG (NEG)  


 


Urine Glucose (UA)   NEG (NEG)  


 


Urine Ketones   NEG (NEG)  


 


Urine Occult Blood   NEG (NEG)  


 


Urine Nitrite   NEG (NEG)  


 


Urine Bilirubin   NEG (NEG)  


 


Urine Urobilinogen   NEG (NEG)  


 


Urine Leukocyte Esterase   NEG (NEG)  


 


Lactic Acid Level


  


  


  


  1.1 mmol/L


(0.4-2.0)











Medications Administered











 Medications


  (Trade)  Dose


 Ordered  Sig/Kalpesh


 Route  Start Time


 Stop Time Status Last Admin


Dose Admin


 


 Sodium Chloride  1,000 ml @ 


 200 mls/hr  Q5H


 IV  8/31/17 13:14


 8/31/17 19:02 DC 8/31/17 14:02


200 MLS/HR











ED Course


Physical exam and history were performed. Nursing notes, EMR, and Medication 

List were personally reviewed. 





Patient appears to have altered mental status over the past one day.  His last 

known well time was around 8 AM.  He does not have obvious facial drooping or 

extremity weakness.  The patient has had surgical procedures recently and is 

acting different according to his family.  IV access was established and labs 

were obtained.  Lactic acid and blood cultures were gathered.  The case was 

discussed with my attending physician, Dr. Tovar, who also and pedal evaluated 

the patient.  We ultimately elected on CT scans of the head, chest, abdomen, 

and pelvis to help establish cause of the patient's symptoms.





The patient's blood work is as above and was reviewed.  He has a very slightly 

elevated but not significantly elevated white count.  There is mild anemia.  

Lactic acid is normal.  Blood cultures 2 are pending.  The remaining labs are 

fairly nondiagnostic including urine which does not show acute obvious 

infection.  The patient's CT scans are as above.  He does not appear to have 

evidence of stroke.  He has left-sided rib fractures.  INR is elevated at 3.4.  

His abdominal CT shows a fluid collection which could represent abscess.





I did discuss the case with the on-call surgeon, Dr Neal, who evaluated the 

patient here in the department.  Please see her dictation for specifics 

regarding this.  I also discussed the case with Dr. Ochoa, who will admit 

the patient for further care and evaluation.  Please see his dictation for 

further patient course, plan, and disposition.





The chart was completed utilizing Dragon Speech Voice Recognition Software. 

Grammatical errors, random word insertions, pronoun errors, and incomplete 

sentences are an occasional consequence of this system due to software 

limitations, ambient noise, and hardware issues. Any formal questions or 

concerns about the content, text, or information contained within the body of 

this dictation should be directly addressed to the provider for clarification.


.





Medical Decision


Differential diagnosis:


Etiologies such as metabolic, infection, hypoglycemia, electrolyte abnormalities

, cardiac sources, intracerebral event, toxicologic, neurologic, as well as 

others were entertained.





Impression





 Primary Impression:  


 Altered mental state





Departure Information


Dispostion


Still a Patient





Condition


FAIR





Referrals


No Doctor, Assigned (PCP)





Forms


WORK / SCHOOL INSTRUCTIONS, HOME CARE DOCUMENTATION FORM,                      

                                          IMPORTANT VISIT INFORMATION





Patient Instructions


[facility]

## 2017-08-31 NOTE — HISTORY AND PHYSICAL
History & Physical


Date & Time of Service:


Aug 31, 2017 at 18:46


Chief Complaint:


Double Vision


Primary Care Physician:


Emani Doctor, Assigned


History of Present Illness


Source:  family, hospital records


The patient is a 77-year-old male with most recent hospitalizations from July 27 to August 10 no from August 16 to August 17, during which time he underwent 

surgery for sigmoid volvulus and had issues with postoperative ileus and 

question of mesenteric thrombus.  He had been discharged from [facility] 6 days ago, and family reports he had been gradually becoming more 

confused and less interactive, and then worsened yesterday, when he had his 

first fall, and then had a second fall earlier in the day prior to arrival.  

His sister reports that he has been leaning toward the right ever since that 

injury to his left side.  She reports that he did not hit his head during this 

time.  His sister does report that since he is been in the emergency department 

and had gotten IV fluids, that he seems to become more responsive and 

interactive with her.





Past Medical/Surgical History


Medical Problems:


(1) BENIGN HYPERTENSION


Status: Chronic  





(2) DIAB W KETOACIDOSIS, TYPE II OR UNSPEC TYPE, UNCONTROLLED


Status: Chronic  





(3) DIVERTICULOSIS COLON (W/O MENT OF HEMORRHAGE)


Status: Chronic  





(4) GOUT NOS


Status: Chronic  





(5) IDIO PERIPH NEURPTHY NOS


Status: Chronic  





(6) MIXED HYPERLIPIDEMIA


Status: Chronic  











Family History





Diabetes mellitus


FH: breast cancer





Social History


Smoking Status:  Never Smoker


Smokeless Tobacco Use:  No


Alcohol Use:  none


Drug Use:  none


Marital Status:  , 


Housing status:  lives with family


Occupational Status:  retired





Immunizations


History of Influenza Vaccine:  Unknown


History of Tetanus Vaccine?:  Unknown


History of Pneumococcal:  Unknown


History of Hepatitis B Vaccine:  Unknown





Multi-Drug Resistant Organisms


History of MDRO:  No





Allergies


Coded Allergies:  


     No Known Allergies (Verified , 8/31/17)





Home Medications


Scheduled


Allopurinol (Zyloprim), 300 MG PO DAILY


Carbidopa/Levodopa (Sinemet 25MG/100MG), 1.5 TAB PO TID


Cholecalciferol (Vitamin D3), 2,000 UNITS PO DAILY


Cyanocobalamin (Vitamin B12), 1,000 MCG PO DAILY


Gabapentin (Neurontin), 300 MG PO QID


Lisinopril (Lisinopril), 2.5 MG PO QAM


Ranitidine (Zantac), 150 MG PO BID


Sertraline (Zoloft), 50 MG PO DAILY


Triamterene/Hctz (Triamterene/Hctz 37.5-25MG), 0.5 TAB PO DAILY


Warfarin Sod (Coumadin), 5 MG PO DAILY@1600





Scheduled PRN


Clonazepam (Klonopin), 0.25 MG PO HS PRN for Sleep


Miconazole Nitrate (Desenex Shake Powder), 1 APPLN EXT BID PRN for Affected 

Skin Folds


Ondansetron Hcl (Zofran), 4 MG PO q6hrs PRN for Nausea


Polyethylene Glycol 3350 (Bulk (Polyethylene Glycol 3350), 17 GM PO DAILY PRN 

for Constipation





Miscellaneous Medications


Acetaminophen (Tylenol), 650 MG PO





Review of Systems


The patient himself is unable to contribute to his history of present illness 

or review of systems.  His family who was in attendance, relate his story as 

above.





Physical Exam


Vital Signs











  Date Time  Temp Pulse Resp B/P (MAP) Pulse Ox O2 Delivery O2 Flow Rate FiO2


 


8/31/17 18:08  83 27  90   


 


8/31/17 18:01    126/89    


 


8/31/17 17:38  80 17  91   


 


8/31/17 17:31    102/65    


 


8/31/17 17:24  85      


 


8/31/17 17:08  84 24  98   


 


8/31/17 17:02    134/59    


 


8/31/17 16:38  77 24     


 


8/31/17 16:33     94 Nasal Cannula 2.0 


 


8/31/17 16:33    115/80    


 


8/31/17 16:31  81 21  88   


 


8/31/17 16:01  77 23 129/67 93   


 


8/31/17 14:56  98 24 115/68 96 Nasal Cannula 2.0 


 


8/31/17 13:48    116/72    


 


8/31/17 13:43  80 21  96   


 


8/31/17 13:31    119/64    


 


8/31/17 13:28 37.1 86 24 138/67 85 Room Air  


 


8/31/17 13:28  82 21  95   


 


8/31/17 13:21     95 Nasal Cannula 2.0 


 


8/31/17 13:19  82      


 


8/31/17 13:05    138/67    








The patient is intermittently opening his eyes, looks disheveled and dehydrated

, lying in bed and leaning to his right side, but in no acute distress.


HEENT--PERRL, EOMI, mucous membranes  and oropharynx dry.


Neck--supple, no JVD or bruits, thyroid normal, trachea midline, no adenopathy.


Heart--normal S1 and S2, no extra beats, no murmurs, rubs or gallops.


Lungs--few coarse breath sounds but overall decreased throughout, no 

respiratory distress, no accessory muscle use.


Abdomen--normal bowel sounds and soft.  Tender left fifth through seventh rib 

areas, no significant abdominal tenderness on exam.


Extremities--no cyanosis, clubbing.  There is bilateral pretibial and pedal 1+ 

pitting Edema. There are good distal pulses b/l.


Dermatologic--normal skin turgor, normal color, warm and dry.


Neurologic--cranial nerves II through XII grossly intact.


Rheumatologic--patient moves all extremities.


Psychiatric--lethargic, primarily nonresponsive.





Diagnostics


Laboratory Results





Results Past 24 Hours








Test


  8/31/17


12:45 8/31/17


13:14 8/31/17


13:45 8/31/17


16:33 Range/Units


 


 


White Blood Count 11.05    4.8-10.8  K/uL


 


Red Blood Count 3.71    4.7-6.1  M/uL


 


Hemoglobin 11.4    14.0-18.0  g/dL


 


Hematocrit 34.0    42-52  %


 


Mean Corpuscular Volume 91.6      fL


 


Mean Corpuscular Hemoglobin 30.7    25-34  pg


 


Mean Corpuscular Hemoglobin


Concent 33.5


  


  


  


  32-36  g/dl


 


 


Platelet Count 273    130-400  K/uL


 


Mean Platelet Volume 10.3    7.4-10.4  fL


 


Neutrophils (%) (Auto) 66.3     %


 


Lymphocytes (%) (Auto) 16.8     %


 


Monocytes (%) (Auto) 14.8     %


 


Eosinophils (%) (Auto) 1.4     %


 


Basophils (%) (Auto) 0.2     %


 


Neutrophils # (Auto) 7.33    1.4-6.5  K/uL


 


Lymphocytes # (Auto) 1.86    1.2-3.4  K/uL


 


Monocytes # (Auto) 1.64    0.11-0.59  K/uL


 


Eosinophils # (Auto) 0.15    0-0.5  K/uL


 


Basophils # (Auto) 0.02    0-0.2  K/uL


 


RDW Standard Deviation 46.2    36.4-46.3  fL


 


RDW Coefficient of Variation 13.8    11.5-14.5  %


 


Immature Granulocyte % (Auto) 0.5     %


 


Immature Granulocyte # (Auto) 0.05    0.00-0.02  K/uL


 


Prothrombin Time


  38.4


  


  


  


  9.0-12.0


SECONDS


 


Prothromb Time International


Ratio 3.4


  


  


  


  0.9-1.1  


 


 


Activated Partial


Thromboplast Time 44.1


  


  


  


  21.0-31.0


SECONDS


 


Partial Thromboplastin Ratio 1.7     


 


Sodium Level 133    136-145  mmol/L


 


Potassium Level 4.1    3.5-5.1  mmol/L


 


Chloride Level 101      mmol/L


 


Carbon Dioxide Level 25    21-32  mmol/L


 


Anion Gap 7.0    3-11  mmol/L


 


Blood Urea Nitrogen 14    7-18  mg/dl


 


Creatinine


  1.10


  


  


  


  0.60-1.40


mg/dl


 


Est Creatinine Clear Calc


Drug Dose 70.0


  


  


  


   ml/min


 


 


Estimated GFR (


American) 75.2


  


  


  


   


 


 


Estimated GFR (Non-


American 64.9


  


  


  


   


 


 


BUN/Creatinine Ratio 13.0    10-20  


 


Random Glucose 110    70-99  mg/dl


 


Calcium Level 9.1    8.5-10.1  mg/dl


 


Total Bilirubin 0.8    0.2-1  mg/dl


 


Aspartate Amino Transf


(AST/SGOT) 14


  


  


  


  15-37  U/L


 


 


Alanine Aminotransferase


(ALT/SGPT) 7


  


  


  


  12-78  U/L


 


 


Alkaline Phosphatase 101      U/L


 


Total Creatine Kinase 147      U/L


 


Creatine Kinase MB 1.3    0.5-3.6  ng/ml


 


Creatine Kinase MB Ratio 0.9    0-3.0  


 


Troponin I < 0.015    0-0.045  ng/ml


 


Total Protein 7.2    6.4-8.2  gm/dl


 


Albumin 3.3    3.4-5.0  gm/dl


 


Globulin 3.9    2.5-4.0  gm/dl


 


Albumin/Globulin Ratio 0.8    0.9-2  


 


Urine Color   YELLOW   


 


Urine Appearance   CLEAR  CLEAR  


 


Urine pH   6.0  4.5-7.5  


 


Urine Specific Gravity   1.018  1.000-1.030  


 


Urine Protein   NEG  NEG  


 


Urine Glucose (UA)   NEG  NEG  


 


Urine Ketones   NEG  NEG  


 


Urine Occult Blood   NEG  NEG  


 


Urine Nitrite   NEG  NEG  


 


Urine Bilirubin   NEG  NEG  


 


Urine Urobilinogen   NEG  NEG  


 


Urine Leukocyte Esterase   NEG  NEG  


 


Lactic Acid Level    1.1 0.4-2.0  mmol/L








Microbiology Results


8/31/17 Blood Culture, Received


          Pending


8/31/17 Blood Culture, Received


          Pending





Diagnostic Radiology


                                                                               

                                                                 


Patient Name: TEODORO ROSALES                               Unit Number:  

[id number]                  


 





 











Dictated: 08/31/17 1421


 


Transcribed: 08/31/17 1421


 


[facility]


 


Printed Date/Time: [~ rep prt dt]/[~ rep prt tm]








 [~ rep ct labl] - [~ rep ct ivnm]


 





   [facility]


 Radiology Department


 [place] [address] [phone]





 











Dictated: 08/31/17 1421


 


Transcribed: 08/31/17 1421


 


[place]


 


Printed Date/Time: [~ rep prt dt]/[~ rep prt tm]








 [~ rep ct labl] - [~ rep ct ivnm]


 








[~ rep ct add3]]


HEAD CT NONCONTRAST





CT DOSE: 1074.96 mGy.cm





HISTORY:      Altered mental status. Intermittent right-sided weakness.





TECHNIQUE: Multiaxial CT images of the head were performed without the use of


intravenous contrast. Automated exposure control was utilized for this study.  A


dose lowering technique was utilized adhering to the principles of ALARA.





Comparison: Head CT 2/21/2017.





Findings: Suboptimal evaluation the brain due to the motion artifact. The


calvarium and skull base are intact. There is no mass, hematoma, midline shift,


acute infarct. White matter hypodensity is nonspecific but suggestive of


microvascular ischemic change. The ventricles and sulci demonstrate mild


age-related involutional changes.





Impression:


Motion artifact. No definite acute intracranial abnormality. Consider repeat


study if the patient symptoms continue to progress. 











Electronically signed by:  Wale Sultana M.D.


8/31/2017 2:26 PM





Dictated Date/Time:  8/31/2017 2:21 PM





 The status of this report is Signed.   


 Draft = Not yet reviewed or approved by Radiologist.  


 Signed = Reviewed and approved by Radiologist.


<AttendingPhy></AttendingPhy> <FamilyPhy>No Doctor, Assigned</FamilyPhy> <

PrimaryPhy>No Doctor, Assigned</PrimaryPhy> <UnitNumber>[id number]</UnitNumber> 

<VisitNumber>[id number]</VisitNumber> <PatientName>TEODORO ROSALES</PatientName

> <DateOfBirth>1940</DateOfBirth> <Location>[facility]</Location> <ServiceDate>

08/31/17</ServiceDate> <MNE>ESINDI</MNE> <OrderingPhy>Murray Macias PA-C</

OrderingPhy> <OrderingPhyMNE>f rep ord dr pineda</OrderingPhyMNE> <DictatingPhyMNE>

f rep dict dr pineda</DictatingPhyMNE> <CCListMNE>f rep ct mne</CCListMNE> <

AdmittingPhyMNE>f pt admit dr pinead</AdmittingPhyMNE> <AttendingPhyMNE>f pt 

attend dr pineda</AttendingPhyMNE>


<ConsultingPhyMNE>f pt consult dr pineda</ConsultingPhyMNE> <FamilyPhyMNE>f pt fam 

dr pineda</FamilyPhyMNE> <OtherPhyMNE>f pt other dr pineda</OtherPhyMNE> <

PrimaryPhyMNE>f pt prim care dr pineda</PrimaryPhyMNE> <ReferringPhyMNE>f pt 

referring dr pineda</ReferringPhyMNE>


                                                                               

                                                                 


Patient Name: TEODORO ROSALES                               Unit Number:  

[id number]                  


 





 











Dictated: 08/31/17 1357


 


Transcribed: 08/31/17 1357


 


[facility]


 


Printed Date/Time: [~ rep prt dt]/[~ rep prt tm]








 [~ rep ct labl] - [~ rep ct ivnm]


 





   [facility]


 Radiology Department


 [place] [address]


 [phone]





 











Dictated: 08/31/17 1357


 


Transcribed: 08/31/17 1357


 





 


Printed Date/Time: [~ rep prt dt]/[~ rep prt tm]








 [~ rep ct labl] - [~ rep ct ivnm]


 








[~ rep ct add3]]


CHEST ONE VIEW PORTABLE





CLINICAL HISTORY: Altered mental status.    





COMPARISON STUDY:  Chest radiograph August 13, 2017.





FINDINGS: Mild left basilar opacity favors atelectasis. Lung volumes are


diminished. Mild cardiomegaly is unchanged. There is no evidence of pulmonary


edema. No pneumothorax is identified. Exam is compromised by motion artifact.


There are possible small bilateral pleural effusions. Prominent upper abdominal


bowel loop may reflect a distended colonic loop, as shown on prior studies.





IMPRESSION:  





1. Possible small bilateral pleural effusions.





2. Diminished lung volumes with left basilar atelectasis.





3. Suspected colonic distention within visualized portions of the upper abdomen.


This is partially imaged on this exam and was shown on prior abdominal CT of


August 13, 2017.











Electronically signed by:  Roge Diaz M.D.


8/31/2017 2:00 PM





Dictated Date/Time:  8/31/2017 1:57 PM





 The status of this report is Signed.   


 Draft = Not yet reviewed or approved by Radiologist.  


 Signed = Reviewed and approved by Radiologist.


<AttendingPhy></AttendingPhy> <FamilyPhy>No Doctor, Assigned</FamilyPhy> <

PrimaryPhy>No Doctor, Assigned</PrimaryPhy> <UnitNumber>[id number]</UnitNumber> 

<VisitNumber>[id number]</VisitNumber> <PatientName>TEODORO ROSALES</PatientName

> <DateOfBirth>1940</DateOfBirth> <Location>[facility]</Location> <ServiceDate>

08/31/17</ServiceDate> <MNE>ESINDI</MNE> <OrderingPhy>Murray Macias PA-C</

OrderingPhy> <OrderingPhyMNE>f rep ord dr pineda</OrderingPhyMNE> <DictatingPhyMNE>

f rep dict dr pineda</DictatingPhyMNE> <CCListMNE>f rep ct mne</CCListMNE> <

AdmittingPhyMNE>f pt admit dr pineda</AdmittingPhyMNE> <AttendingPhyMNE>f pt 

attend dr pineda</AttendingPhyMNE>


<ConsultingPhyMNE>f pt consult dr pineda</ConsultingPhyMNE> <FamilyPhyMNE>f pt fam 

dr pineda</FamilyPhyMNE> <OtherPhyMNE>f pt other dr pineda</OtherPhyMNE> <

PrimaryPhyMNE>f pt prim care dr pineda</PrimaryPhyMNE> <ReferringPhyMNE>f pt 

referring dr pineda</ReferringPhyMNE>


                                                                               

                                                                 


Patient Name: TEODORO ROSALES                               Unit Number:  

[id number]                  


 





 











Dictated: 08/31/17 1553


 


Transcribed: 08/31/17 1553


 


[facility]


 


Printed Date/Time: [~ rep prt dt]/[~ rep prt tm]








 [~ rep ct labl] - [~ rep ct ivnm]


 





   [facility]


 Radiology Department


 [place] [address]


 [phone]





 











Dictated: 08/31/17 1553


 


Transcribed: 08/31/17 1553


 


[facility]


 


Printed Date/Time: [~ rep prt dt]/[~ rep prt tm]








 [~ rep ct labl] - [~ rep ct ivnm]


 








[~ rep ct add3]]


CT OF THE CHEST WITH IV CONTRAST





CLINICAL HISTORY: Left-sided chest and abdominal pain following injury.    





COMPARISON STUDY:  Chest radiograph performed earlier today and chest CT


September 19, 2014. 





TECHNIQUE:  Following IV administration of 94 mL of Optiray-320, helical axial


images of the chest were obtained.  Sagittal and coronal reconstructions were


viewed as well as maximal intensity projections on an independent 3-D


workstation.  A dose lowering technique was utilized adhering to the principles


of ALARA.








CT DOSE: 2165.58 mGy.cm





FINDINGS:  There is no evidence of traumatic injury to the thoracic aorta. The


heart is moderately enlarged. No enlarged thoracic lymph nodes are identified.


There is no pneumothorax. A small left pleural effusion is present. Linear and


groundglass opacities within lungs favor atelectasis. No acute thoracic spine


fracture is present. There are acute minimally displaced fractures of the left


fifth and seventh ribs as well as a nondisplaced fracture of the lateral left


sixth rib. These are acute. There are additional old left-sided rib fractures.


Calcified right lower lobe granuloma is present. Lungs are suboptimally assessed


due to respiratory motion. 





IMPRESSION:  





1. Acute mildly displaced fractures of the lateral left fifth and seventh ribs


and acute nondisplaced fracture of the lateral left sixth rib. No pneumothorax.


Small left pleural effusion may reflect a small hemothorax given the rib


fractures.





2. Subpleural and ground glass opacities which favor atelectasis. An infectious


process is considered less likely.





3. No additional acute traumatic findings within the chest.











Electronically signed by:  Roge Diaz M.D.


8/31/2017 4:03 PM





Dictated Date/Time:  8/31/2017 3:53 PM





 The status of this report is Signed.   


 Draft = Not yet reviewed or approved by Radiologist.  


 Signed = Reviewed and approved by Radiologist.


<AttendingPhy></AttendingPhy> <FamilyPhy>No Doctor, Assigned</FamilyPhy> <

PrimaryPhy>No Doctor, Assigned</PrimaryPhy> <UnitNumber>[id number]</UnitNumber> 

<VisitNumber>[id number]</VisitNumber> <PatientName>TEODORO ROSALES</PatientName

> <DateOfBirth>1940</DateOfBirth> <Location>[facility]</Location> <ServiceDate>

08/31/17</ServiceDate> <MNE>ESINDI</MNE> <OrderingPhy>Murray Macias PA-C</

OrderingPhy> <OrderingPhyMNE>f rep ord dr pineda</OrderingPhyMNE> <DictatingPhyMNE>

f rep dict dr pineda</DictatingPhyMNE> <CCListMNE>f rep ct mne</CCListMNE> <

AdmittingPhyMNE>f pt admit dr pineda</AdmittingPhyMNE> <AttendingPhyMNE>f pt 

attend dr pineda</AttendingPhyMNE>


<ConsultingPhyMNE>f pt consult dr pineda</ConsultingPhyMNE> <FamilyPhyMNE>f pt fam 

dr pineda</FamilyPhyMNE> <OtherPhyMNE>f pt other dr pineda</OtherPhyMNE> <

PrimaryPhyMNE>f pt prim care dr pineda</PrimaryPhyMNE> <ReferringPhyMNE>f pt 

referring dr pineda</ReferringPhyMNE>


                                                                               

                                                                 


Patient Name: TEODORO ROSALES                               Unit Number:  

[id number]                  


 





 











Dictated: 08/31/17 1539


 


Transcribed: 08/31/17 1539


 


[facility]


 


Printed Date/Time: [~ rep prt dt]/[~ rep prt tm]








 [~ rep ct labl] - [~ rep ct ivnm]


 





   [facility]


 Radiology Department


 [place] [address] [phone]





 











Dictated: 08/31/17 1539


 


Transcribed: 08/31/17 1539


 


[facility]


 


Printed Date/Time: [~ rep prt dt]/[~ rep prt tm]








 [~ rep ct labl] - [~ rep ct ivnm]


 








ABD/PELVIS IV CONTRAST ONLY





CLINICAL HISTORY: 77 years-old Male presenting with left side chest/abd injury. 





TECHNIQUE: Multidetector CT of the abdomen and pelvis was performed after the


administration of intravenous contrast. IV contrast: 94 mL of Optiray 320. A


dose lowering technique was used consistent with the principles of ALARA (as low


as reasonably achievable). 





COMPARISON: 8/13/2017.





CT DOSE (mGy.cm): The estimated cumulative dose is 2165.58 inclusive of the CT


head, CT chest.





FINDINGS:





The examination is slightly degraded by motion artifact, which decreases image


quality and diagnostic sensitivity.





 topogram: Gaseous distention of bowel.





Lung bases: Extensive bibasilar consolidation increased from prior. Small left


pleural effusion. Multichamber enlargement of the heart. Aortic valve


atherosclerosis. No pericardial effusion.





Liver: Normal morphology. No liver lesion. Patent hepatic vasculature.





Biliary: No intrahepatic or extrahepatic biliary ductal dilatation. Normal


gallbladder.





Pancreas: Mild parenchymal atrophy. Duodenal diverticulum noted in the region of


the pancreatic head.





Spleen: Parenchymal calcification, possibly suggesting old granulomatous


disease.





Adrenal glands: Nonspecific nodular thickening of the adrenal glands.





Kidneys and ureters: Exophytic 8 cm simple appearing left renal cyst with


lobular extension into the renal sinus. No hydronephrosis. Ureters normal.





Bladder: Mild circumferential bladder wall thickening.





Pelvic organs: Prostate enlargement likely secondary to benign prostatic


hyperplasia.





Bowel: Anastomosis noted in the region of the upper rectum likely indicating


prior sigmoidectomy. Moderate stool burden throughout the colon, which is also


mildly distended with gas. Ileocolic anastomosis at the level of the hepatic


flexure, where there is associated minimal surrounding stranding, possibly


indicating chronic scarring/adhesions. No bowel obstruction. Small bowel is not


significantly distended. Duodenal diverticulum at the level of pancreatic head


suggested. No pneumatosis.





Peritoneal cavity: The previously noted collection immediately superior to the


sigmoid anastomosis has slightly decreased in size now measuring 4.1 x 1.4 cm,


previously 4.4 x 2.4 cm. A small focus of gas is noted within this collection.


Gas within the collection appears new from prior.





Vasculature: Interval opacification of the jejunal vein draining into the


superior mesenteric vein without evidence of thrombus on the current exam.


Atherosclerosis of the normal caliber abdominal aorta.





Lymph nodes: No enlarged lymph nodes in the abdomen or pelvis.





Abdominal wall: Postsurgical changes along the midline ventral abdominal wall.





Musculoskeletal: Degenerative changes of the spine.





IMPRESSION:


1.  No evidence of acute intra-abdominal injury.





2.  Postsurgical changes of sigmoidectomy with interval decrease in size of the


gas and fluid containing collection immediately superior to the anastomosis.


This fluid collection remains concerning for an abscess secondary to anastomotic


leak, especially given the presence of gas, which is new from prior.





3.  Interval resolution of previously demonstrated filling defect within a


branch of the superior mesenteric vein.





4.  Persistent colonic distention with stool and gas, suggesting colonic ileus.

















Electronically signed by:  Johnnie Gutiérrez M.D.


8/31/2017 3:49 PM





Dictated Date/Time:  8/31/2017 3:39 PM





 The status of this report is Signed.   


 Draft = Not yet reviewed or approved by Radiologist.  


 Signed = Reviewed and approved by Radiologist.


<AttendingPhy></AttendingPhy> <FamilyPhy>No Doctor, Assigned</FamilyPhy> <

PrimaryPhy>No Doctor, Assigned</PrimaryPhy> <UnitNumber>[id number]</UnitNumber> 

<VisitNumber>[id number]</VisitNumber> <PatientName>TEODORO ROSALES</PatientName

> <DateOfBirth>1940</DateOfBirth> <Location>[facility]</Location> <ServiceDate>

08/31/17</ServiceDate> <MNE>ESINDI</MNE> <OrderingPhy>Murray Macias PA-C</

OrderingPhy> <OrderingPhyMNE>f rep ord dr pineda</OrderingPhyMNE> <DictatingPhyMNE>

f rep dict dr pineda</DictatingPhyMNE> <CCListMNE>f rep ct mne</CCListMNE> <

AdmittingPhyMNE>f pt admit dr pineda</AdmittingPhyMNE> <AttendingPhyMNE>f pt 

attend dr pineda</AttendingPhyMNE>


<ConsultingPhyMNE>f pt consult dr pineda</ConsultingPhyMNE> <FamilyPhyMNE>f pt fam 

dr pineda</FamilyPhyMNE> <OtherPhyMNE>f pt other dr pineda</OtherPhyMNE> <

PrimaryPhyMNE>f pt prim care dr pineda</PrimaryPhyMNE> <ReferringPhyMNE>f pt 

referring dr pineda</ReferringPhyMNE>





EKG


EKG shows normal sinus rhythm at 83 bpm, left axis deviation, no change 

compared to 08/13/2017





Impression


Assessment and Plan


Altered mental status/dehydration--patient will be admitted to the medical 

surgical floor.


No signs of infection at this time.


The patient's sister notes that his interactiveness had improved significantly 

with the addition of IV fluids in the emergency department.


Continue normal saline with KCl 20 mEq 100 mils per hour.


Serial CBC with differential, BMP and magnesium levels.


Patient's family went to more aggressively encourage patient to keep his fluid 

intake up.





Left 5 through 7 rib fractures, with 5 and 7 mildly displaced--


Lidoderm patch on in the a.m. off in p.m.


Voltaren gel to be applied 4 times a day when necessary when the Lidoderm patch 

is not on.





Status post sigmoidectomy for sigmoid volvulus during admission July 27 to 

August 10.


Patient was seen by Dr. Donato in the outpatient office earlier this week and 

was told he was progressing appropriately.


No antibiotics needed at this time.





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


Hold warfarin 5 mg by mouth daily until INR less than or equal to 2.5.





Hypertension --hold lisinopril and triamterene/HCTZ





Parkinson's--carbidopa/levodopa 25/100, one half tablets by mouth 3 times a day 

as soon as patient can take by mouth.





Obstructive sleep apnea patient will use own CPAP from home.





GERD--until taking by mouth, change ranitidine 150 mg by mouth to famotidine 20 

mg IV every 12.





GPN--resume gabapentin 3 mg by mouth 4 times a day when taking by mouth.





Gout--resume allopurinol 3 mg by mouth daily when taking by mouth.





Level of Care


Med/Surg





Advanced Directives


Existing Advance Directive:  No


Existing Living Will:  No


Existing Power of :  No





Resuscitation Status


FULL RESUSCITATION





VTE Prophylaxis


VTE Risk Assessment Done? Y/N:  Yes


Risk Level:  Moderate


Given or contraindicated:  Warfarin (Coumadin)





Social Service Consult


Receiving Home Health

## 2017-08-31 NOTE — DIAGNOSTIC IMAGING REPORT
ABD/PELVIS IV CONTRAST ONLY



CLINICAL HISTORY: 77 years-old Male presenting with left side chest/abd injury. 



TECHNIQUE: Multidetector CT of the abdomen and pelvis was performed after the

administration of intravenous contrast. IV contrast: 94 mL of Optiray 320. A

dose lowering technique was used consistent with the principles of ALARA (as low

as reasonably achievable). 



COMPARISON: 8/13/2017.



CT DOSE (mGy.cm): The estimated cumulative dose is 2165.58 inclusive of the CT

head, CT chest.



FINDINGS:



The examination is slightly degraded by motion artifact, which decreases image

quality and diagnostic sensitivity.



 topogram: Gaseous distention of bowel.



Lung bases: Extensive bibasilar consolidation increased from prior. Small left

pleural effusion. Multichamber enlargement of the heart. Aortic valve

atherosclerosis. No pericardial effusion.



Liver: Normal morphology. No liver lesion. Patent hepatic vasculature.



Biliary: No intrahepatic or extrahepatic biliary ductal dilatation. Normal

gallbladder.



Pancreas: Mild parenchymal atrophy. Duodenal diverticulum noted in the region of

the pancreatic head.



Spleen: Parenchymal calcification, possibly suggesting old granulomatous

disease.



Adrenal glands: Nonspecific nodular thickening of the adrenal glands.



Kidneys and ureters: Exophytic 8 cm simple appearing left renal cyst with

lobular extension into the renal sinus. No hydronephrosis. Ureters normal.



Bladder: Mild circumferential bladder wall thickening.



Pelvic organs: Prostate enlargement likely secondary to benign prostatic

hyperplasia.



Bowel: Anastomosis noted in the region of the upper rectum likely indicating

prior sigmoidectomy. Moderate stool burden throughout the colon, which is also

mildly distended with gas. Ileocolic anastomosis at the level of the hepatic

flexure, where there is associated minimal surrounding stranding, possibly

indicating chronic scarring/adhesions. No bowel obstruction. Small bowel is not

significantly distended. Duodenal diverticulum at the level of pancreatic head

suggested. No pneumatosis.



Peritoneal cavity: The previously noted collection immediately superior to the

sigmoid anastomosis has slightly decreased in size now measuring 4.1 x 1.4 cm,

previously 4.4 x 2.4 cm. A small focus of gas is noted within this collection.

Gas within the collection appears new from prior.



Vasculature: Interval opacification of the jejunal vein draining into the

superior mesenteric vein without evidence of thrombus on the current exam.

Atherosclerosis of the normal caliber abdominal aorta.



Lymph nodes: No enlarged lymph nodes in the abdomen or pelvis.



Abdominal wall: Postsurgical changes along the midline ventral abdominal wall.



Musculoskeletal: Degenerative changes of the spine.



IMPRESSION:

1.  No evidence of acute intra-abdominal injury.



2.  Postsurgical changes of sigmoidectomy with interval decrease in size of the

gas and fluid containing collection immediately superior to the anastomosis.

This fluid collection remains concerning for an abscess secondary to anastomotic

leak, especially given the presence of gas, which is new from prior.



3.  Interval resolution of previously demonstrated filling defect within a

branch of the superior mesenteric vein.



4.  Persistent colonic distention with stool and gas, suggesting colonic ileus.











Electronically signed by:  Johnnie Gutiérrez M.D.

8/31/2017 3:49 PM



Dictated Date/Time:  8/31/2017 3:39 PM

## 2017-09-01 VITALS
DIASTOLIC BLOOD PRESSURE: 68 MMHG | TEMPERATURE: 98.96 F | HEART RATE: 77 BPM | SYSTOLIC BLOOD PRESSURE: 138 MMHG | OXYGEN SATURATION: 95 %

## 2017-09-01 VITALS
SYSTOLIC BLOOD PRESSURE: 142 MMHG | DIASTOLIC BLOOD PRESSURE: 73 MMHG | HEART RATE: 77 BPM | OXYGEN SATURATION: 94 % | TEMPERATURE: 98.06 F

## 2017-09-01 VITALS
SYSTOLIC BLOOD PRESSURE: 124 MMHG | HEART RATE: 80 BPM | TEMPERATURE: 98.96 F | OXYGEN SATURATION: 94 % | DIASTOLIC BLOOD PRESSURE: 66 MMHG

## 2017-09-01 VITALS — OXYGEN SATURATION: 94 %

## 2017-09-01 VITALS
HEART RATE: 87 BPM | SYSTOLIC BLOOD PRESSURE: 155 MMHG | OXYGEN SATURATION: 97 % | DIASTOLIC BLOOD PRESSURE: 88 MMHG | TEMPERATURE: 97.88 F

## 2017-09-01 LAB
INR PPP: 3.4 (ref 0.9–1.1)
PROTHROMBIN TIME: 38.7 SECONDS (ref 9–12)

## 2017-09-01 RX ADMIN — SODIUM CHLORIDE AND POTASSIUM CHLORIDE SCH MLS/HR: 9; 1.49 INJECTION, SOLUTION INTRAVENOUS at 05:43

## 2017-09-01 RX ADMIN — FAMOTIDINE SCH MLS/HR: 10 INJECTION INTRAVENOUS at 08:04

## 2017-09-01 RX ADMIN — FAMOTIDINE SCH MLS/HR: 10 INJECTION INTRAVENOUS at 20:54

## 2017-09-01 RX ADMIN — GABAPENTIN SCH MG: 300 CAPSULE ORAL at 20:53

## 2017-09-01 RX ADMIN — GABAPENTIN SCH MG: 300 CAPSULE ORAL at 08:05

## 2017-09-01 RX ADMIN — LIDOCAINE SCH PATCH: 50 PATCH CUTANEOUS at 08:06

## 2017-09-01 RX ADMIN — GABAPENTIN SCH MG: 300 CAPSULE ORAL at 13:25

## 2017-09-01 RX ADMIN — SERTRALINE HYDROCHLORIDE SCH MG: 50 TABLET, FILM COATED ORAL at 08:05

## 2017-09-01 RX ADMIN — CARBIDOPA AND LEVODOPA SCH TAB: 25; 100 TABLET ORAL at 08:05

## 2017-09-01 RX ADMIN — CARBIDOPA AND LEVODOPA SCH TAB: 25; 100 TABLET ORAL at 13:25

## 2017-09-01 RX ADMIN — CARBIDOPA AND LEVODOPA SCH TAB: 25; 100 TABLET ORAL at 20:54

## 2017-09-01 RX ADMIN — GABAPENTIN SCH MG: 300 CAPSULE ORAL at 16:18

## 2017-09-01 RX ADMIN — Medication SCH EA: at 20:54

## 2017-09-01 RX ADMIN — SODIUM CHLORIDE AND POTASSIUM CHLORIDE SCH MLS/HR: 9; 1.49 INJECTION, SOLUTION INTRAVENOUS at 18:28

## 2017-09-01 NOTE — PROGRESS NOTE
Subjective


Date of Service:


Sep 1, 2017.


Subjective


Pt evaluation today including:  conversation w/ patient, conversation w/ family

, chart review, lab review, review of studies, review of inpatient medication 

list


sitting up in chair, pleasant , looks good , NAD, c/o left rib cage pain when 

deep breathing, denies dizziness





Problem List


Medical Problems:


(1) Abdominal distension


Status: Acute  





(2) Rhabdomyolysis


Status: Acute  





(3) Sigmoid volvulus


Status: Acute  





(4) Vomiting


Status: Acute  





(5) Weakness


Status: Acute  





Social History Problems:


(1) Status post partial colectomy


Status: Acute  








Review of Systems


Constitutional:  No fever, No chills, No sweats, No weight loss, No weakness, 

No fatigue, No problem reported


Eyes:  No worsening of vision, No eye pain, No redness, No discharge, No 

diplopia


ENT:  No hearing loss, No unusual epistaxis, No nasal symptoms, No sore throat, 

No tinnitus, No dental problems, No trouble swallowing


Respiratory:  No cough, No sputum, No wheezing, No shortness of breath, No 

dyspnea on exertion, No dyspnea at rest, No hemoptysis


Cardiac:  + chest pain, No orthopnea, No PND, No edema, No claudication, No 

palpitations


Abdomen:  No pain, No nausea, No vomiting, No diarrhea, No constipation


Musculoskeletal:  No joint pain, No muscle pain, No swelling, No calf pain


Male :  No dysuria, No urinary frequency, No incontinence, No nocturia more 

than once/night, No slowing stream, No hematuria


Neurologic:  No memory loss, No paralysis, No weakness, No numbness/tingling, 

No vertigo, No balance problems


Psychiatric:  No depression symptoms, No anhedonism, No anxiety, No insomnia, 

No substance abuse


Heme:  No abnormal bleeding/bruising, No clotting problems, No swollen lymph 

nodes, No night sweats


Endo:  No fatigue, No excessive thirst, No excessive urination


Skin:  No rash, No itch, No new/changing skin lesions, No color change, No 

bleeding





Objective


Vital Signs











  Date Time  Temp Pulse Resp B/P (MAP) Pulse Ox O2 Delivery O2 Flow Rate FiO2


 


9/1/17 11:03     94 Room Air  


 


9/1/17 07:58 37.2 77 18 138/68 (91) 95 Room Air  


 


9/1/17 07:40      Room Air  


 


9/1/17 00:44 37.2 80 18 124/66 (85) 94  2.0 


 


9/1/17 00:00      Room Air  


 


8/31/17 23:11 37.2 80 18 120/68 (85) 93  2.0 


 


8/31/17 19:37 36.9 79 18 128/69 91 Room Air  


 


8/31/17 18:08  83 27  90   


 


8/31/17 18:01    126/89    


 


8/31/17 17:38  80 17  91   


 


8/31/17 17:31    102/65    


 


8/31/17 17:24  85      


 


8/31/17 17:08  84 24  98   


 


8/31/17 17:02    134/59    


 


8/31/17 16:38  77 24     


 


8/31/17 16:33     94 Nasal Cannula 2.0 


 


8/31/17 16:33    115/80    


 


8/31/17 16:31  81 21  88   


 


8/31/17 16:01  77 23 129/67 93   


 


8/31/17 14:56  98 24 115/68 96 Nasal Cannula 2.0 


 


8/31/17 13:48    116/72    


 


8/31/17 13:43  80 21  96   


 


8/31/17 13:31    119/64    


 


8/31/17 13:28 37.1 86 24 138/67 85 Room Air  


 


8/31/17 13:28  82 21  95   


 


8/31/17 13:21     95 Nasal Cannula 2.0 


 


8/31/17 13:19  82      


 


8/31/17 13:05    138/67    











Physical Exam


General Appearance:  WD/WN, no apparent distress, + pertinent finding (looks 

better than last time I was seeing him)


Eyes:  normal inspection, PERRL, EOMI, sclerae normal


ENT:  normal ENT inspection, hearing grossly normal, pharynx normal


Neck:  supple, no adenopathy, thyroid normal, no JVD, no carotid bruits, 

trachea midline


Respiratory/Chest:  chest non-tender, lungs clear, normal breath sounds, no 

respiratory distress, no accessory muscle use


Cardiovascular:  regular rate, rhythm, no edema, no gallop, no JVD, no murmur


Abdomen:  normal bowel sounds, non tender, soft, no organomegaly, no pulsatile 

mass


Extremities:  normal range of motion, non-tender, normal inspection, no pedal 

edema, no calf tenderness, normal capillary refill, pelvis stable


Neurologic/Psychiatric:  CNs II-XII nml as tested, no motor/sensory deficits, 

alert, normal mood/affect, oriented x 3, + abnormal cerebellar tests


Skin:  normal color, warm/dry, no rash


Lymphatic:  no adenopathy





Laboratory Results





Last 24 Hours








Test


  8/31/17


12:45 8/31/17


13:14 8/31/17


13:45 8/31/17


16:33


 


White Blood Count 11.05 K/uL    


 


Red Blood Count 3.71 M/uL    


 


Hemoglobin 11.4 g/dL    


 


Hematocrit 34.0 %    


 


Mean Corpuscular Volume 91.6 fL    


 


Mean Corpuscular Hemoglobin 30.7 pg    


 


Mean Corpuscular Hemoglobin


Concent 33.5 g/dl 


  


  


  


 


 


Platelet Count 273 K/uL    


 


Mean Platelet Volume 10.3 fL    


 


Neutrophils (%) (Auto) 66.3 %    


 


Lymphocytes (%) (Auto) 16.8 %    


 


Monocytes (%) (Auto) 14.8 %    


 


Eosinophils (%) (Auto) 1.4 %    


 


Basophils (%) (Auto) 0.2 %    


 


Neutrophils # (Auto) 7.33 K/uL    


 


Lymphocytes # (Auto) 1.86 K/uL    


 


Monocytes # (Auto) 1.64 K/uL    


 


Eosinophils # (Auto) 0.15 K/uL    


 


Basophils # (Auto) 0.02 K/uL    


 


RDW Standard Deviation 46.2 fL    


 


RDW Coefficient of Variation 13.8 %    


 


Immature Granulocyte % (Auto) 0.5 %    


 


Immature Granulocyte # (Auto) 0.05 K/uL    


 


Prothrombin Time 38.4 SECONDS    


 


Prothromb Time International


Ratio 3.4 


  


  


  


 


 


Activated Partial


Thromboplast Time 44.1 SECONDS 


  


  


  


 


 


Partial Thromboplastin Ratio 1.7    


 


Sodium Level 133 mmol/L    


 


Potassium Level 4.1 mmol/L    


 


Chloride Level 101 mmol/L    


 


Carbon Dioxide Level 25 mmol/L    


 


Anion Gap 7.0 mmol/L    


 


Blood Urea Nitrogen 14 mg/dl    


 


Creatinine 1.10 mg/dl    


 


Est Creatinine Clear Calc


Drug Dose 70.0 ml/min 


  


  


  


 


 


Estimated GFR (


American) 75.2 


  


  


  


 


 


Estimated GFR (Non-


American 64.9 


  


  


  


 


 


BUN/Creatinine Ratio 13.0    


 


Random Glucose 110 mg/dl    


 


Calcium Level 9.1 mg/dl    


 


Total Bilirubin 0.8 mg/dl    


 


Aspartate Amino Transf


(AST/SGOT) 14 U/L 


  


  


  


 


 


Alanine Aminotransferase


(ALT/SGPT) 7 U/L 


  


  


  


 


 


Alkaline Phosphatase 101 U/L    


 


Total Creatine Kinase 147 U/L    


 


Creatine Kinase MB 1.3 ng/ml    


 


Creatine Kinase MB Ratio 0.9     


 


Troponin I < 0.015 ng/ml    


 


Total Protein 7.2 gm/dl    


 


Albumin 3.3 gm/dl    


 


Globulin 3.9 gm/dl    


 


Albumin/Globulin Ratio 0.8    


 


Urine Color   YELLOW  


 


Urine Appearance   CLEAR  


 


Urine pH   6.0  


 


Urine Specific Gravity   1.018  


 


Urine Protein   NEG  


 


Urine Glucose (UA)   NEG  


 


Urine Ketones   NEG  


 


Urine Occult Blood   NEG  


 


Urine Nitrite   NEG  


 


Urine Bilirubin   NEG  


 


Urine Urobilinogen   NEG  


 


Urine Leukocyte Esterase   NEG  


 


Lactic Acid Level    1.1 mmol/L 


 


Test


  9/1/17


05:11 9/1/17


09:41 9/1/17


11:19 


 


 


Prothrombin Time 38.7 SECONDS    


 


Prothromb Time International


Ratio 3.4 


  


  


  


 


 


Bedside Glucose  106 mg/dl  111 mg/dl  











Assessment and Plan


78 yo WM with  admitted on 8/31/2017 with Altered mental status/dehydration--

patient will be admitted to the medical surgical floor.


No signs of infection at this time.








Altered mental statu: ? dehydration, he has no signs of infection, head ct neg, 

now is in his baseline, [id number] conversational


dehydration? possible mild dehydration, cont ivf, and 


Patient's family went to more aggressively encourage patient to keep his fluid 

intake up.





Left 5 through 7 rib fractures, with 5 and 7 mildly displaced, stable, 


Lidoderm patch on in the a.m. off in p.m.


Voltaren gel to be applied 4 times a day when necessary when the Lidoderm patch 

is not on.


surgeon saw pt





Status post sigmoidectomy for sigmoid volvulus during admission July 27 to 

August 10.


Patient was seen by Dr. Donato in the outpatient office earlier this week and 

was told he was progressing appropriately.


No antibiotics needed at this time.





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 


 INR  was 3.4. cont Hold warfarin 5 mg by mouth daily until INR less than or 

equal to 2.5. 


because of high risk of fall and severe injuries such as the above mild 

displaced ribs fracture, will discuss with family about the risks and benefits 

and next step. 





Hypertension --cont hold lisinopril and triamterene/HCTZ





Parkinson's--carbidopa/levodopa 25/100, one half tablets by mouth 3 times a day 

as soon as patient can take by mouth.





Obstructive sleep apnea patient will use own CPAP from home.





GERD--until taking by mouth, change ranitidine 150 mg by mouth to famotidine 20 

mg IV every 12.





GPN--resume gabapentin 3 mg by mouth 4 times a day when taking by mouth.





Gout--resume allopurinol 3 mg by mouth daily when taking by mouth.





DVT adn GI px ordered


pt ot  SW consulted


Continued [facility] stay due to:  multiple IV medications needed


Discharge planning:  home

## 2017-09-02 VITALS
HEART RATE: 85 BPM | SYSTOLIC BLOOD PRESSURE: 146 MMHG | OXYGEN SATURATION: 90 % | TEMPERATURE: 99.14 F | DIASTOLIC BLOOD PRESSURE: 78 MMHG

## 2017-09-02 VITALS
HEART RATE: 81 BPM | OXYGEN SATURATION: 92 % | SYSTOLIC BLOOD PRESSURE: 156 MMHG | DIASTOLIC BLOOD PRESSURE: 76 MMHG | TEMPERATURE: 97.7 F

## 2017-09-02 LAB
ANION GAP SERPL CALC-SCNC: 8 MMOL/L (ref 3–11)
BASOPHILS # BLD: 0.01 K/UL (ref 0–0.2)
BASOPHILS NFR BLD: 0.1 %
BUN SERPL-MCNC: 9 MG/DL (ref 7–18)
BUN/CREAT SERPL: 10 (ref 10–20)
CALCIUM SERPL-MCNC: 8.7 MG/DL (ref 8.5–10.1)
CHLORIDE SERPL-SCNC: 104 MMOL/L (ref 98–107)
CO2 SERPL-SCNC: 25 MMOL/L (ref 21–32)
COMPLETE: YES
CREAT CL PREDICTED SERPL C-G-VRATE: 81.7 ML/MIN
CREAT SERPL-MCNC: 0.92 MG/DL (ref 0.6–1.4)
EOSINOPHIL NFR BLD AUTO: 259 K/UL (ref 130–400)
GLUCOSE SERPL-MCNC: 95 MG/DL (ref 70–99)
HCT VFR BLD CALC: 33.4 % (ref 42–52)
IG%: 0.6 %
IMM GRANULOCYTES NFR BLD AUTO: 16.5 %
INR PPP: 3.5 (ref 0.9–1.1)
LYMPHOCYTES # BLD: 1.46 K/UL (ref 1.2–3.4)
MAGNESIUM SERPL-MCNC: 1.8 MG/DL (ref 1.8–2.4)
MCH RBC QN AUTO: 29.3 PG (ref 25–34)
MCHC RBC AUTO-ENTMCNC: 32.3 G/DL (ref 32–36)
MCV RBC AUTO: 90.5 FL (ref 80–100)
MONOCYTES NFR BLD: 17 %
NEUTROPHILS # BLD AUTO: 2.8 %
NEUTROPHILS NFR BLD AUTO: 63 %
PMV BLD AUTO: 9.8 FL (ref 7.4–10.4)
POTASSIUM SERPL-SCNC: 3.9 MMOL/L (ref 3.5–5.1)
PROTHROMBIN TIME: 39.4 SECONDS (ref 9–12)
RBC # BLD AUTO: 3.69 M/UL (ref 4.7–6.1)
SODIUM SERPL-SCNC: 137 MMOL/L (ref 136–145)
WBC # BLD AUTO: 8.86 K/UL (ref 4.8–10.8)

## 2017-09-02 RX ADMIN — LIDOCAINE SCH PATCH: 50 PATCH CUTANEOUS at 08:50

## 2017-09-02 RX ADMIN — GABAPENTIN SCH MG: 300 CAPSULE ORAL at 13:07

## 2017-09-02 RX ADMIN — FAMOTIDINE SCH MLS/HR: 10 INJECTION INTRAVENOUS at 08:49

## 2017-09-02 RX ADMIN — GABAPENTIN SCH MG: 300 CAPSULE ORAL at 08:49

## 2017-09-02 RX ADMIN — SERTRALINE HYDROCHLORIDE SCH MG: 50 TABLET, FILM COATED ORAL at 08:49

## 2017-09-02 RX ADMIN — CARBIDOPA AND LEVODOPA SCH TAB: 25; 100 TABLET ORAL at 13:07

## 2017-09-02 RX ADMIN — GABAPENTIN SCH MG: 300 CAPSULE ORAL at 17:59

## 2017-09-02 RX ADMIN — SODIUM CHLORIDE AND POTASSIUM CHLORIDE SCH MLS/HR: 9; 1.49 INJECTION, SOLUTION INTRAVENOUS at 14:42

## 2017-09-02 RX ADMIN — GABAPENTIN SCH MG: 300 CAPSULE ORAL at 20:59

## 2017-09-02 RX ADMIN — Medication SCH EA: at 20:59

## 2017-09-02 RX ADMIN — CARBIDOPA AND LEVODOPA SCH TAB: 25; 100 TABLET ORAL at 08:49

## 2017-09-02 NOTE — PROGRESS NOTE
Subjective


Date of Service:


Sep 2, 2017.


Subjective


Pt evaluation today including:  conversation w/ patient, conversation w/ family

, physical exam, chart review, lab review, review of studies, review of 

inpatient medication list


Tired, in bed,


Looks tired


Mild resting tremor which is not new


No special complaint





Problem List


Medical Problems:


(1) Abdominal distension


Status: Acute  





(2) Rhabdomyolysis


Status: Acute  





(3) Sigmoid volvulus


Status: Acute  





(4) Vomiting


Status: Acute  





(5) Weakness


Status: Acute  





Social History Problems:


(1) Status post partial colectomy


Status: Acute  








Review of Systems


Constitutional:  + problem reported (not able to obtain because patient looks 

tired and does not want to talk to much), No fever, No chills


Respiratory:  + cough





Objective


Vital Signs











  Date Time  Temp Pulse Resp B/P (MAP) Pulse Ox O2 Delivery O2 Flow Rate FiO2


 


9/2/17 15:04 37.3 85 20 146/78 (100) 90 Room Air  


 


9/2/17 08:00      Room Air  


 


9/2/17 07:21 36.5 81 20 156/76 (102) 92 Room Air  


 


9/2/17 00:00      CPAP  


 


9/1/17 22:56 36.6 87 18 155/88 (110) 97 CPAP  


 


9/1/17 16:35      Room Air  











Physical Exam


General Appearance:  WD/WN, no apparent distress, + thin, + pertinent finding (

frail, mild lethargic)


Eyes:  normal inspection, PERRL, EOMI, sclerae normal


ENT:  normal ENT inspection, hearing grossly normal, pharynx normal


Neck:  supple, no adenopathy, thyroid normal, no JVD, no carotid bruits, 

trachea midline


Respiratory/Chest:  chest non-tender, normal breath sounds, no respiratory 

distress, no accessory muscle use, + decreased breath sounds, + pertinent 

finding (left anterior chest wall and lateral chest were tender in palpation)


Cardiovascular:  regular rate, rhythm, no edema, no gallop, no JVD, no murmur


Abdomen:  normal bowel sounds, non tender, soft, no organomegaly, no pulsatile 

mass, + pertinent finding (abdominal wound was healing well)


Extremities:  normal range of motion, non-tender, normal inspection, no pedal 

edema, no calf tenderness, normal capillary refill, pelvis stable


Neurologic/Psychiatric:  CNs II-XII nml as tested, no motor/sensory deficits, 

alert, normal mood/affect, oriented x 3


Skin:  normal color, warm/dry, no rash, + pertinent finding (kyphosis)


Lymphatic:  no adenopathy





Laboratory Results





Last 24 Hours








Test


  9/1/17


16:30 9/1/17


20:29 9/2/17


06:50 9/2/17


07:32


 


Bedside Glucose 101 mg/dl  108 mg/dl   98 mg/dl 


 


White Blood Count   8.86 K/uL  


 


Red Blood Count   3.69 M/uL  


 


Hemoglobin   10.8 g/dL  


 


Hematocrit   33.4 %  


 


Mean Corpuscular Volume   90.5 fL  


 


Mean Corpuscular Hemoglobin   29.3 pg  


 


Mean Corpuscular Hemoglobin


Concent 


  


  32.3 g/dl 


  


 


 


Platelet Count   259 K/uL  


 


Mean Platelet Volume   9.8 fL  


 


Neutrophils (%) (Auto)   63.0 %  


 


Lymphocytes (%) (Auto)   16.5 %  


 


Monocytes (%) (Auto)   17.0 %  


 


Eosinophils (%) (Auto)   2.8 %  


 


Basophils (%) (Auto)   0.1 %  


 


Neutrophils # (Auto)   5.58 K/uL  


 


Lymphocytes # (Auto)   1.46 K/uL  


 


Monocytes # (Auto)   1.51 K/uL  


 


Eosinophils # (Auto)   0.25 K/uL  


 


Basophils # (Auto)   0.01 K/uL  


 


RDW Standard Deviation   45.0 fL  


 


RDW Coefficient of Variation   13.4 %  


 


Immature Granulocyte % (Auto)   0.6 %  


 


Immature Granulocyte # (Auto)   0.05 K/uL  


 


Prothrombin Time   39.4 SECONDS  


 


Prothromb Time International


Ratio 


  


  3.5 


  


 


 


Sodium Level   137 mmol/L  


 


Potassium Level   3.9 mmol/L  


 


Chloride Level   104 mmol/L  


 


Carbon Dioxide Level   25 mmol/L  


 


Anion Gap   8.0 mmol/L  


 


Blood Urea Nitrogen   9 mg/dl  


 


Creatinine   0.92 mg/dl  


 


Est Creatinine Clear Calc


Drug Dose 


  


  81.7 ml/min 


  


 


 


Estimated GFR (


American) 


  


  93.3 


  


 


 


Estimated GFR (Non-


American 


  


  80.5 


  


 


 


BUN/Creatinine Ratio   10.0  


 


Random Glucose   95 mg/dl  


 


Calcium Level   8.7 mg/dl  


 


Magnesium Level   1.8 mg/dl  


 


Test


  9/2/17


11:23 


  


  


 


 


Bedside Glucose 103 mg/dl    











Assessment and Plan


78 yo WM with  admitted on 8/31/2017 with Altered mental status/dehydration--

patient will be admitted to the medical surgical floor.


No signs of infection at this time.








Altered mental statu: 


Likely from dementia, or sundown  SYNDROME, yesterday he looks so well to me 

was up and talk, conversational


Altered mental statu?  Secondary dehydration, 


So far no signs of infection, 


Emergency room relation head ct neg, possible now is in his baseline,


Encourage oral intake, discontinue IV fluid


Patient's family want to more aggressively encourage patient to keep his fluid 

intake up.





Left 5 through 7 rib fractures, with 5 and 7 mildly displaced, stable, 


Lidoderm patch on in the a.m. off in p.m.


Voltaren gel to be applied 4 times a day when necessary when the Lidoderm patch 

is not on.


surgeon saw pt


Continue incentive spirometry





Status post sigmoidectomy for sigmoid volvulus during admission July 27 to 

August 10.


Patient was seen by Dr. Donato in the outpatient office earlier this week and 

was told he was progressing appropriately.


No antibiotics needed at this time.





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 


because of high risk of fall and severe injuries such as the above mild 

displaced ribs fracture, 


Discussed with family , including daughter Olya, has reported to me, she 

discussed with 2 brothers, and decided  no more Coumadin or any other 

anticoagulation for the possible mesenteric thrombosis,  discussed the risks 

and benefits and pros and cons with Olya, she understand and willing  to take 

all risks





Hypertension --cont hold lisinopril and triamterene/HCTZ





Parkinson's--carbidopa/levodopa 25/100, one half tablets by mouth 3 times a day 

as soon as patient can take by mouth.





Obstructive sleep apnea patient will use own CPAP from home.





GERD--until taking by mouth, change ranitidine 150 mg by mouth to famotidine 20 

mg IV every 12.





GPN--resume gabapentin 3 mg by mouth 4 times a day when taking by mouth.





Gout--resume allopurinol 3 mg by mouth daily when taking by mouth.





DVT adn GI px ordered


pt ot recommend possible need a rehabilitation,  SW consulted


Continued [facility] stay due to:  home environment unsafe for pt


Discharge planning:  home

## 2017-09-03 VITALS
DIASTOLIC BLOOD PRESSURE: 63 MMHG | TEMPERATURE: 99.5 F | OXYGEN SATURATION: 91 % | HEART RATE: 75 BPM | SYSTOLIC BLOOD PRESSURE: 143 MMHG

## 2017-09-03 VITALS
DIASTOLIC BLOOD PRESSURE: 78 MMHG | SYSTOLIC BLOOD PRESSURE: 146 MMHG | OXYGEN SATURATION: 93 % | TEMPERATURE: 98.06 F | HEART RATE: 69 BPM

## 2017-09-03 VITALS
TEMPERATURE: 98.24 F | HEART RATE: 54 BPM | SYSTOLIC BLOOD PRESSURE: 153 MMHG | DIASTOLIC BLOOD PRESSURE: 76 MMHG | OXYGEN SATURATION: 97 %

## 2017-09-03 VITALS
SYSTOLIC BLOOD PRESSURE: 142 MMHG | HEART RATE: 83 BPM | DIASTOLIC BLOOD PRESSURE: 77 MMHG | TEMPERATURE: 98.78 F | OXYGEN SATURATION: 93 %

## 2017-09-03 VITALS — OXYGEN SATURATION: 93 %

## 2017-09-03 LAB
ANION GAP SERPL CALC-SCNC: 5 MMOL/L (ref 3–11)
BASOPHILS # BLD: 0.01 K/UL (ref 0–0.2)
BASOPHILS NFR BLD: 0.1 %
BUN SERPL-MCNC: 9 MG/DL (ref 7–18)
BUN/CREAT SERPL: 10.2 (ref 10–20)
CALCIUM SERPL-MCNC: 8.5 MG/DL (ref 8.5–10.1)
CHLORIDE SERPL-SCNC: 105 MMOL/L (ref 98–107)
CO2 SERPL-SCNC: 27 MMOL/L (ref 21–32)
COMPLETE: YES
CREAT CL PREDICTED SERPL C-G-VRATE: 85.4 ML/MIN
CREAT SERPL-MCNC: 0.88 MG/DL (ref 0.6–1.4)
EOSINOPHIL NFR BLD AUTO: 247 K/UL (ref 130–400)
GLUCOSE SERPL-MCNC: 97 MG/DL (ref 70–99)
HCT VFR BLD CALC: 32.6 % (ref 42–52)
IG%: 0.6 %
IMM GRANULOCYTES NFR BLD AUTO: 20 %
LYMPHOCYTES # BLD: 1.69 K/UL (ref 1.2–3.4)
MAGNESIUM SERPL-MCNC: 1.8 MG/DL (ref 1.8–2.4)
MCH RBC QN AUTO: 29.7 PG (ref 25–34)
MCHC RBC AUTO-ENTMCNC: 32.8 G/DL (ref 32–36)
MCV RBC AUTO: 90.6 FL (ref 80–100)
MONOCYTES NFR BLD: 14.4 %
NEUTROPHILS # BLD AUTO: 2.2 %
NEUTROPHILS NFR BLD AUTO: 62.7 %
PMV BLD AUTO: 9.2 FL (ref 7.4–10.4)
POTASSIUM SERPL-SCNC: 3.8 MMOL/L (ref 3.5–5.1)
RBC # BLD AUTO: 3.6 M/UL (ref 4.7–6.1)
SODIUM SERPL-SCNC: 137 MMOL/L (ref 136–145)
WBC # BLD AUTO: 8.46 K/UL (ref 4.8–10.8)

## 2017-09-03 RX ADMIN — CARBIDOPA AND LEVODOPA SCH TAB: 25; 100 TABLET ORAL at 12:35

## 2017-09-03 RX ADMIN — GABAPENTIN SCH MG: 300 CAPSULE ORAL at 12:35

## 2017-09-03 RX ADMIN — LIDOCAINE SCH PATCH: 50 PATCH CUTANEOUS at 07:31

## 2017-09-03 RX ADMIN — GABAPENTIN SCH MG: 300 CAPSULE ORAL at 07:31

## 2017-09-03 RX ADMIN — GABAPENTIN SCH MG: 300 CAPSULE ORAL at 21:02

## 2017-09-03 RX ADMIN — CARBIDOPA AND LEVODOPA SCH TAB: 25; 100 TABLET ORAL at 18:06

## 2017-09-03 RX ADMIN — SODIUM CHLORIDE AND POTASSIUM CHLORIDE SCH MLS/HR: 9; 1.49 INJECTION, SOLUTION INTRAVENOUS at 10:10

## 2017-09-03 RX ADMIN — CARBIDOPA AND LEVODOPA SCH TAB: 25; 100 TABLET ORAL at 07:31

## 2017-09-03 RX ADMIN — Medication SCH EA: at 21:01

## 2017-09-03 RX ADMIN — RANITIDINE SCH MG: 150 TABLET ORAL at 21:02

## 2017-09-03 RX ADMIN — SERTRALINE HYDROCHLORIDE SCH MG: 50 TABLET, FILM COATED ORAL at 07:31

## 2017-09-03 RX ADMIN — GABAPENTIN SCH MG: 300 CAPSULE ORAL at 18:06

## 2017-09-03 NOTE — PROGRESS NOTE
Subjective


Date of Service:


Sep 3, 2017.


Subjective


Pt evaluation today including:  conversation w/ patient, conversation w/ family

, physical exam, chart review, lab review, review of studies, review of 

inpatient medication list


Is sleeping, but not on CPAP machine, during 10 minutes talk with daughter in 

bedside, patient has a several times sleep apnea, which wake him up several 

times and fall sleep again





Problem List


Medical Problems:


(1) Abdominal distension


Status: Acute  





(2) Rhabdomyolysis


Status: Acute  





(3) Sigmoid volvulus


Status: Acute  





(4) Vomiting


Status: Acute  





(5) Weakness


Status: Acute  





Social History Problems:


(1) Status post partial colectomy


Status: Acute  








Review of Systems


Constitutional:  + problem reported (not able to obtain because patient is 

sleep  now)





Objective


Vital Signs











  Date Time  Temp Pulse Resp B/P (MAP) Pulse Ox O2 Delivery O2 Flow Rate FiO2


 


9/3/17 08:00      Room Air  


 


9/3/17 07:37 37.1 83 20 142/77 (98) 93 Room Air  


 


9/3/17 00:18 37.5 75 18 143/63 (89) 91 Room Air  


 


9/3/17 00:00      Room Air  


 


9/2/17 20:00      Room Air  


 


9/2/17 16:00      Room Air  


 


9/2/17 15:04 37.3 85 20 146/78 (100) 90 Room Air  











Physical Exam


General Appearance:  + thin, + pertinent finding (frail)


Respiratory/Chest:  + decreased breath sounds


Cardiovascular:  regular rate, rhythm, no edema, no gallop


Abdomen:  normal bowel sounds, soft


Extremities:  normal range of motion, non-tender, normal inspection


Skin:  + pertinent finding ( no facial droop)





Laboratory Results





Last 24 Hours








Test


  9/2/17


11:23 9/2/17


16:41 9/2/17


20:49 9/3/17


07:09


 


Bedside Glucose 103 mg/dl  144 mg/dl  99 mg/dl  


 


White Blood Count    8.46 K/uL 


 


Red Blood Count    3.60 M/uL 


 


Hemoglobin    10.7 g/dL 


 


Hematocrit    32.6 % 


 


Mean Corpuscular Volume    90.6 fL 


 


Mean Corpuscular Hemoglobin    29.7 pg 


 


Mean Corpuscular Hemoglobin


Concent 


  


  


  32.8 g/dl 


 


 


Platelet Count    247 K/uL 


 


Mean Platelet Volume    9.2 fL 


 


Neutrophils (%) (Auto)    62.7 % 


 


Lymphocytes (%) (Auto)    20.0 % 


 


Monocytes (%) (Auto)    14.4 % 


 


Eosinophils (%) (Auto)    2.2 % 


 


Basophils (%) (Auto)    0.1 % 


 


Neutrophils # (Auto)    5.30 K/uL 


 


Lymphocytes # (Auto)    1.69 K/uL 


 


Monocytes # (Auto)    1.22 K/uL 


 


Eosinophils # (Auto)    0.19 K/uL 


 


Basophils # (Auto)    0.01 K/uL 


 


RDW Standard Deviation    44.6 fL 


 


RDW Coefficient of Variation    13.4 % 


 


Immature Granulocyte % (Auto)    0.6 % 


 


Immature Granulocyte # (Auto)    0.05 K/uL 


 


Sodium Level    137 mmol/L 


 


Potassium Level    3.8 mmol/L 


 


Chloride Level    105 mmol/L 


 


Carbon Dioxide Level    27 mmol/L 


 


Anion Gap    5.0 mmol/L 


 


Blood Urea Nitrogen    9 mg/dl 


 


Creatinine    0.88 mg/dl 


 


Est Creatinine Clear Calc


Drug Dose 


  


  


  85.4 ml/min 


 


 


Estimated GFR (


American) 


  


  


  96.7 


 


 


Estimated GFR (Non-


American 


  


  


  83.4 


 


 


BUN/Creatinine Ratio    10.2 


 


Random Glucose    97 mg/dl 


 


Calcium Level    8.5 mg/dl 


 


Magnesium Level    1.8 mg/dl 


 


Test


  9/3/17


07:20 


  


  


 


 


Bedside Glucose 98 mg/dl    











Assessment and Plan


76 yo WM with  admitted on 8/31/2017 with Altered mental status/dehydration--

patient will be admitted to the medical surgical floor.


No signs of infection at this time.








Altered mental statu: 


Likely from dementia, or sundown  SYNDROME, 


All because of sleep apnea sometimes he does not put on BiPAP machine which may 

caused the order mental status to 


He has been looks so well to me was up and talk, conversational, but sometimes 

quite confused


Possible dehydration, was treated


So far no signs of infection, 


Emergency room relation head ct neg,


Encourage oral intake, discontinue IV fluid, and also encourage nurse and 

family to put him on cpap  machine when he sleep


Patient's family want to more aggressively encourage patient to keep his fluid 

intake up.





Left 5 through 7 rib fractures, with 5 and 7 mildly displaced, stable, pain 

control


Continue Lidoderm patch on in the a.m. off in p.m.


Continue Voltaren gel to be applied 4 times a day when necessary when the 

Lidoderm patch is not on.


surgeon saw pt


Continue incentive spirometry





Status post sigmoidectomy for sigmoid volvulus during admission July 27 to 

August 10.,  Stable


Patient was seen by Dr. Donato in the outpatient office earlier this week and 

was told he was progressing appropriately.





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


because of high risk of fall and severe injuries such as the above mild 

displaced ribs fracture, 


Discussed with family , including daughter Olya, has reported to me, she 

discussed with 2 brothers, and decided  no more Coumadin or any other 

anticoagulation for the possible mesenteric thrombosis,  discussed the risks 

and benefits and pros and cons with Olya, she understand and willing  to take 

all risks





Hypertension --cont current care





Parkinson's--carbidopa/levodopa 25/100, one half tablets by mouth 3 times a day 

as soon as patient can take by mouth.





Obstructive sleep apnea:  CPAP from home, discussed with patient's family and 

nurse about using the machine  appropriately whenever he is in a sleep





GERD--until taking by mouth, change ranitidine 150 mg by mouth to famotidine 20 

mg IV every 12.





GPN--resume gabapentin 3 mg by mouth 4 times a day when taking by mouth.





Gout--resume allopurinol 3 mg by mouth daily when taking by mouth.





DVT adn GI px ordered


pt ot recommend possible need a rehabilitation,  SW consulted


Daughter looking for HSR  with [place]  as A  backup


Continued [facility] stay due to:  home environment unsafe for pt


Discharge planning:  rehab hospital, skilled nursing facility

## 2017-09-04 VITALS — HEART RATE: 67 BPM | DIASTOLIC BLOOD PRESSURE: 53 MMHG | SYSTOLIC BLOOD PRESSURE: 97 MMHG

## 2017-09-04 VITALS
DIASTOLIC BLOOD PRESSURE: 60 MMHG | OXYGEN SATURATION: 95 % | TEMPERATURE: 97.7 F | HEART RATE: 60 BPM | SYSTOLIC BLOOD PRESSURE: 112 MMHG

## 2017-09-04 VITALS
HEART RATE: 60 BPM | OXYGEN SATURATION: 97 % | TEMPERATURE: 97.88 F | DIASTOLIC BLOOD PRESSURE: 81 MMHG | SYSTOLIC BLOOD PRESSURE: 143 MMHG

## 2017-09-04 VITALS
SYSTOLIC BLOOD PRESSURE: 178 MMHG | HEART RATE: 65 BPM | OXYGEN SATURATION: 95 % | TEMPERATURE: 97.52 F | DIASTOLIC BLOOD PRESSURE: 81 MMHG

## 2017-09-04 VITALS — OXYGEN SATURATION: 95 %

## 2017-09-04 RX ADMIN — LISINOPRIL SCH MG: 2.5 TABLET ORAL at 08:02

## 2017-09-04 RX ADMIN — CARBIDOPA AND LEVODOPA SCH TAB: 25; 100 TABLET ORAL at 17:07

## 2017-09-04 RX ADMIN — ALLOPURINOL SCH MG: 300 TABLET ORAL at 08:03

## 2017-09-04 RX ADMIN — GABAPENTIN SCH MG: 300 CAPSULE ORAL at 12:24

## 2017-09-04 RX ADMIN — Medication SCH MCG: at 08:02

## 2017-09-04 RX ADMIN — GABAPENTIN SCH MG: 300 CAPSULE ORAL at 20:43

## 2017-09-04 RX ADMIN — GABAPENTIN SCH MG: 300 CAPSULE ORAL at 08:02

## 2017-09-04 RX ADMIN — LIDOCAINE SCH PATCH: 50 PATCH CUTANEOUS at 08:06

## 2017-09-04 RX ADMIN — RANITIDINE SCH MG: 150 TABLET ORAL at 20:43

## 2017-09-04 RX ADMIN — Medication SCH INTER.UNIT: at 08:02

## 2017-09-04 RX ADMIN — SERTRALINE HYDROCHLORIDE SCH MG: 50 TABLET, FILM COATED ORAL at 08:03

## 2017-09-04 RX ADMIN — CARBIDOPA AND LEVODOPA SCH TAB: 25; 100 TABLET ORAL at 12:24

## 2017-09-04 RX ADMIN — RANITIDINE SCH MG: 150 TABLET ORAL at 08:02

## 2017-09-04 RX ADMIN — CARBIDOPA AND LEVODOPA SCH TAB: 25; 100 TABLET ORAL at 08:02

## 2017-09-04 RX ADMIN — GABAPENTIN SCH MG: 300 CAPSULE ORAL at 17:07

## 2017-09-04 RX ADMIN — Medication SCH EA: at 20:43

## 2017-09-04 NOTE — PROGRESS NOTE
Subjective


Date of Service:


Sep 4, 2017.


Subjective


Pt evaluation today including:  conversation w/ patient, conversation w/ family

, physical exam, review of inpatient medication list


Pain:  left rib pain


PO Intake:  adequate


Voiding:  no voiding problems


patient sitting up in chair, resting comfortably


reports left rib pain with deep breaths, changing positions, it is tolerable 

though





Problem List


Medical Problems:


(1) Abdominal distension


Status: Acute  





(2) Rhabdomyolysis


Status: Acute  





(3) Sigmoid volvulus


Status: Acute  





(4) Vomiting


Status: Acute  





(5) Weakness


Status: Acute  





Social History Problems:


(1) Status post partial colectomy


Status: Acute  











Review of Systems


Constitutional:  + weakness


Cardiac:  + chest pain (left rib pain)


All Other Systems:  Reviewed and Negative





Medications





Current Inpatient Medications








 Medications


  (Trade)  Dose


 Ordered  Sig/Kalpesh


 Route  Start Time


 Stop Time Status Last Admin


Dose Admin


 


 Ioversol


  (Optiray 320)  125 ml  UD  PRN


 IV  8/31/17 15:00


 9/4/17 14:59   


 


 


 Acetaminophen


  (Tylenol Tab)  650 mg  Q4H  PRN


 PO  8/31/17 18:00


 9/30/17 17:59   


 


 


 Gabapentin


  (Neurontin Cap)  300 mg  QID


 PO  8/31/17 21:00


 9/30/17 20:59  9/4/17 12:24


300 MG


 


 Miconazole Nitrate


  (Desenex Powder)  1 appln  BID  PRN


 EXT  8/31/17 18:00


 9/30/17 17:59   


 


 


 Sertraline HCl


  (Zoloft Tab)  50 mg  DAILY


 PO  9/1/17 09:00


 10/1/17 08:59  9/4/17 08:03


50 MG


 


 Ondansetron HCl


  (Zofran Inj)  4 mg  Q6H  PRN


 IV  8/31/17 18:00


 9/30/17 17:59   


 


 


 Lidocaine


  (Lidoderm Patch


 5%)  1 patch  QAM


 TD  9/1/17 09:00


 10/1/17 08:59  9/4/17 08:06


1 PATCH


 


 Miscellaneous


  (Remove Lidoderm


 Patch)  1 ea  DAILY@21


 N/A  8/31/17 21:00


 9/30/17 20:59  9/3/17 21:01


1 EA


 


 Diclofenac Sodium


  (Voltaren 1% Top


 Gel)  1 appln  QID  PRN


 EXT  8/31/17 18:00


 9/30/17 17:59   


 


 


 Miscellaneous


  (Iv Fluids


 Completed)  1 ea  PRN  PRN


 N/A  8/31/17 21:15


 8/31/18 21:14   


 


 


 Polyethylene


  (Miralax Powder


 Packet)  17 gm  DAILY  PRN


 PO  9/1/17 12:30


 10/1/17 12:29   


 


 


 Carbidopa/Levodopa


  (Sinemet 25/


 100MG Tab)  1.5 tab  DAILY@0830,1230,1730


 PO  9/3/17 08:30


 10/3/17 08:29  9/4/17 12:24


1.5 TAB


 


 Allopurinol


  (Zyloprim Tab)  300 mg  DAILY


 PO  9/4/17 09:00


 10/4/17 08:59  9/4/17 08:03


300 MG


 


 Clonazepam


  (Klonopin Tab)  0.25 mg  HS  PRN


 PO  9/3/17 11:30


 10/3/17 11:29   


 


 


 Lisinopril


  (Zestril Tab)  2.5 mg  QAM


 PO  9/4/17 09:00


 10/4/17 08:59  9/4/17 08:02


2.5 MG


 


 Ranitidine HCl


  (zANTac TAB)  150 mg  BID


 PO  9/3/17 21:00


 10/3/17 20:59  9/4/17 08:02


150 MG


 


 Cholecalciferol


  (Vitamin D Tab)  2,000


 inter.unit  DAILY


 PO  9/4/17 09:00


 10/4/17 08:59  9/4/17 08:02


2,000 INTER.UNIT


 


 Cyanocobalamin


  (Vitamin B-12


 Tab)  1,000 mcg  DAILY


 PO  9/4/17 09:00


 10/4/17 08:59  9/4/17 08:02


1,000 MCG











Objective


Vital Signs











  Date Time  Temp Pulse Resp B/P (MAP) Pulse Ox O2 Delivery O2 Flow Rate FiO2


 


9/4/17 10:05  67  97/53 (68)    


 


9/4/17 08:00      Room Air  


 


9/4/17 07:20 36.4 65 20 178/81 (113) 95 Room Air  


 


9/4/17 00:00      CPAP  


 


9/3/17 23:45 36.8 54 20 153/76 (101) 97 CPAP  


 


9/3/17 20:00      Room Air  


 


9/3/17 16:22 36.7 69 18 146/78 (100) 93 Room Air  


 


9/3/17 16:00     93 Room Air  











Physical Exam


General Appearance:  WD/WN, no apparent distress


Eyes:  normal inspection, EOMI, sclerae normal


ENT:  normal ENT inspection, hearing grossly normal, pharynx normal


Neck:  supple, no adenopathy, no JVD, trachea midline


Respiratory/Chest:  lungs clear, normal breath sounds, no respiratory distress, 

no accessory muscle use, + pertinent finding (left ribs tender to palpation)


Cardiovascular:  regular rate, rhythm, no edema, no gallop, no JVD, no murmur


Abdomen:  normal bowel sounds, non tender, soft, no organomegaly


Extremities:  normal range of motion, non-tender, normal inspection, no pedal 

edema, no calf tenderness


Neurologic/Psychiatric:  CNs II-XII nml as tested, no motor/sensory deficits, 

alert, normal mood/affect, oriented x 3


Skin:  normal color, warm/dry, no rash


Lymphatic:  no adenopathy





Laboratory Results





Last 24 Hours








Test


  9/3/17


16:04 9/3/17


20:35 9/4/17


07:35 9/4/17


11:11


 


Bedside Glucose 95 mg/dl  106 mg/dl  95 mg/dl  147 mg/dl 











Assessment and Plan


76 yo WM with  admitted on 8/31/2017 with Altered mental status/dehydration--

patient will be admitted to the medical surgical floor.


No signs of infection at this time.





- Altered mental status: resolved, possibly due to dehydration complicating his 

Parkinsonism


   drinking well and eating better, oriented x 3, cooperative and pleasant


   no signs of infection, CT head normal, normal electrolytes


   pain from rib fractures could have been contributing





- Left 5-7 rib fractures, no significant hemothorax


   continue Lidoderm patch and Voltaren gell


   incentive spirometry





Status post sigmoidectomy for sigmoid volvulus during admission July 27 to 

August 10.,  Stable


Patient was seen by Dr. Donato in the outpatient office earlier last week and 

was told he was progressing appropriately.





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


because of high risk of fall and severe injuries such as the above mild 

displaced ribs fracture, 


Discussed with family , including daughter Olya, has reported to me, she 

discussed with 2 brothers, and decided  no more Coumadin or any other 

anticoagulation for the possible mesenteric thrombosis,  discussed the risks 

and benefits and pros and cons with Olya, she understand and willing  to take 

all risks





Hypertension --cont current care





Parkinson's--carbidopa/levodopa 25/100, one half tablets by mouth 3 times a day 

as soon as patient can take by mouth.





Obstructive sleep apnea:  CPAP from home, discussed with patient's family and 

nurse about using the machine  appropriately whenever he is in a sleep





GERD--until taking by mouth, change ranitidine 150 mg by mouth to famotidine 20 

mg IV every 12.





GPN--resume gabapentin 3 mg by mouth 4 times a day when taking by mouth.





Gout--resume allopurinol 3 mg by mouth daily when taking by mouth.





DVT adn GI px ordered








PT/OT: evaluated on 9/2, needs updated notes as he is now stronger and more 

cooperative


family hoping for [facility], will re-evaluate and discuss with CM tomorrow


Continued [facility] stay due to:  home environment unsafe for pt


Discharge planning:  rehab hospital, skilled nursing facility

## 2017-09-05 VITALS
TEMPERATURE: 97.88 F | DIASTOLIC BLOOD PRESSURE: 62 MMHG | OXYGEN SATURATION: 97 % | HEART RATE: 79 BPM | SYSTOLIC BLOOD PRESSURE: 121 MMHG

## 2017-09-05 VITALS
HEART RATE: 57 BPM | OXYGEN SATURATION: 95 % | DIASTOLIC BLOOD PRESSURE: 74 MMHG | SYSTOLIC BLOOD PRESSURE: 160 MMHG | TEMPERATURE: 97.52 F

## 2017-09-05 VITALS
HEART RATE: 61 BPM | TEMPERATURE: 96.98 F | OXYGEN SATURATION: 100 % | DIASTOLIC BLOOD PRESSURE: 74 MMHG | SYSTOLIC BLOOD PRESSURE: 147 MMHG

## 2017-09-05 VITALS
TEMPERATURE: 97.7 F | SYSTOLIC BLOOD PRESSURE: 95 MMHG | HEART RATE: 75 BPM | OXYGEN SATURATION: 92 % | DIASTOLIC BLOOD PRESSURE: 53 MMHG

## 2017-09-05 VITALS — OXYGEN SATURATION: 100 %

## 2017-09-05 VITALS — OXYGEN SATURATION: 95 %

## 2017-09-05 RX ADMIN — Medication SCH INTER.UNIT: at 07:46

## 2017-09-05 RX ADMIN — SERTRALINE HYDROCHLORIDE SCH MG: 50 TABLET, FILM COATED ORAL at 07:46

## 2017-09-05 RX ADMIN — CARBIDOPA AND LEVODOPA SCH TAB: 25; 100 TABLET ORAL at 16:53

## 2017-09-05 RX ADMIN — RANITIDINE SCH MG: 150 TABLET ORAL at 07:46

## 2017-09-05 RX ADMIN — ALLOPURINOL SCH MG: 300 TABLET ORAL at 07:46

## 2017-09-05 RX ADMIN — Medication SCH EA: at 20:24

## 2017-09-05 RX ADMIN — GABAPENTIN SCH MG: 300 CAPSULE ORAL at 20:24

## 2017-09-05 RX ADMIN — LIDOCAINE SCH PATCH: 50 PATCH CUTANEOUS at 07:46

## 2017-09-05 RX ADMIN — RANITIDINE SCH MG: 150 TABLET ORAL at 20:24

## 2017-09-05 RX ADMIN — Medication SCH MCG: at 07:46

## 2017-09-05 RX ADMIN — CARBIDOPA AND LEVODOPA SCH TAB: 25; 100 TABLET ORAL at 07:45

## 2017-09-05 RX ADMIN — GABAPENTIN SCH MG: 300 CAPSULE ORAL at 07:45

## 2017-09-05 RX ADMIN — CARBIDOPA AND LEVODOPA SCH TAB: 25; 100 TABLET ORAL at 12:30

## 2017-09-05 RX ADMIN — GABAPENTIN SCH MG: 300 CAPSULE ORAL at 12:30

## 2017-09-05 RX ADMIN — GABAPENTIN SCH MG: 300 CAPSULE ORAL at 16:53

## 2017-09-05 RX ADMIN — LISINOPRIL SCH MG: 2.5 TABLET ORAL at 07:46

## 2017-09-05 NOTE — HOSPITALIST PROGRESS NOTE
Hospitalist Progress Note


Date of Service


Sep 5, 2017.





Subjective


Pt evaluation today including:  conversation w/ patient, physical exam, chart 

review, lab review, review of studies, review of inpatient medication list


Patient seen and evaluated. No acute events overnight.


Continues to have rib pain but reports it is well-controlled with medications.


Participated in PT/OT this AM. Is in agreement to go to rehab. 


Mental status is baseline at this time.





   Constitutional:  No fever, No chills


   Respiratory:  + cough, No shortness of breath


   Cardiovascular:  + chest pain (Rib pain)





Objective


Vital Signs











  Date Time  Temp Pulse Resp B/P (MAP) Pulse Ox O2 Delivery O2 Flow Rate FiO2


 


9/5/17 10:51 36.6 79 18 113/62 (79) 97 Room Air  





    121/65 (83)    


 


9/5/17 10:04 36.5 75 16 95/57 (70) 92 Room Air  





    93/53 (66)    


 


9/5/17 08:00     95 Room Air  


 


9/5/17 06:54 36.4 57 18 160/74 (102) 95 BiPAP  


 


9/5/17 00:00      CPAP  


 


9/4/17 23:55 36.6 60 20 143/81 (101) 97 BiPAP  


 


9/4/17 16:00     95 CPAP 2.0 


 


9/4/17 15:40 36.5 60 18 112/60 (77) 95 CPAP  











Laboratory Results





Last 24 Hours








Test


  9/4/17


16:08 9/4/17


19:37 9/5/17


07:18 9/5/17


11:12


 


Bedside Glucose 120 mg/dl  113 mg/dl  87 mg/dl  112 mg/dl 











Assessment and Plan


76 yo WM with  admitted on 8/31/2017 with Altered mental status/dehydration--

patient will be admitted to the medical surgical floor.


No signs of infection at this time.





Altered Mental Status: RESOLVED


- Dehydration vs Parkinsonism complication - currently at baseline mentation





L 5-7 Rib Fx with Possible Small Hemothorax:


- Lidoderm patch and Voltaren gel


- Incentive spirometry





S/P Sigmoidectomy for Sigmoid Volvulus: STABLE


- Outpatient evaluation by surgeon - progressing appropriately





Mesenteric Thrombus:


- Recommendations for anticoagulation x 6 months but due to fall risk - family 

decision for no further anticoagulation





HTN:


- Lisinopril 2.5 mg daily





Parkinsons:


- Sinemet 1.5 tab TID





GIULIA:


- May use own CPAP





Code Status: FULL RESUSCITATION





Disposition: 


- Await approval for HSN. Medically optimal for D/C when arrangements made


Continued [facility] stay due to:  home environment unsafe for pt


Discharge planning:  rehab hospital ([facility])

## 2017-09-06 VITALS — OXYGEN SATURATION: 95 %

## 2017-09-06 VITALS
TEMPERATURE: 98.42 F | OXYGEN SATURATION: 95 % | SYSTOLIC BLOOD PRESSURE: 132 MMHG | HEART RATE: 63 BPM | DIASTOLIC BLOOD PRESSURE: 71 MMHG

## 2017-09-06 VITALS
TEMPERATURE: 97.52 F | HEART RATE: 63 BPM | OXYGEN SATURATION: 99 % | SYSTOLIC BLOOD PRESSURE: 154 MMHG | DIASTOLIC BLOOD PRESSURE: 77 MMHG

## 2017-09-06 VITALS
HEART RATE: 60 BPM | OXYGEN SATURATION: 93 % | SYSTOLIC BLOOD PRESSURE: 119 MMHG | DIASTOLIC BLOOD PRESSURE: 65 MMHG | TEMPERATURE: 97.88 F

## 2017-09-06 VITALS
SYSTOLIC BLOOD PRESSURE: 141 MMHG | OXYGEN SATURATION: 95 % | DIASTOLIC BLOOD PRESSURE: 69 MMHG | HEART RATE: 74 BPM | TEMPERATURE: 97.88 F

## 2017-09-06 VITALS — OXYGEN SATURATION: 99 %

## 2017-09-06 RX ADMIN — LISINOPRIL SCH MG: 2.5 TABLET ORAL at 07:45

## 2017-09-06 RX ADMIN — Medication SCH INTER.UNIT: at 07:45

## 2017-09-06 RX ADMIN — LIDOCAINE SCH PATCH: 50 PATCH CUTANEOUS at 07:44

## 2017-09-06 RX ADMIN — GABAPENTIN SCH MG: 300 CAPSULE ORAL at 12:29

## 2017-09-06 RX ADMIN — Medication SCH EA: at 20:44

## 2017-09-06 RX ADMIN — CARBIDOPA AND LEVODOPA SCH TAB: 25; 100 TABLET ORAL at 07:45

## 2017-09-06 RX ADMIN — ALLOPURINOL SCH MG: 300 TABLET ORAL at 07:45

## 2017-09-06 RX ADMIN — GABAPENTIN SCH MG: 300 CAPSULE ORAL at 07:45

## 2017-09-06 RX ADMIN — RANITIDINE SCH MG: 150 TABLET ORAL at 07:45

## 2017-09-06 RX ADMIN — RANITIDINE SCH MG: 150 TABLET ORAL at 20:44

## 2017-09-06 RX ADMIN — Medication SCH MCG: at 07:45

## 2017-09-06 RX ADMIN — GABAPENTIN SCH MG: 300 CAPSULE ORAL at 16:57

## 2017-09-06 RX ADMIN — CARBIDOPA AND LEVODOPA SCH TAB: 25; 100 TABLET ORAL at 16:57

## 2017-09-06 RX ADMIN — CARBIDOPA AND LEVODOPA SCH TAB: 25; 100 TABLET ORAL at 11:41

## 2017-09-06 RX ADMIN — SERTRALINE HYDROCHLORIDE SCH MG: 50 TABLET, FILM COATED ORAL at 07:45

## 2017-09-06 RX ADMIN — GABAPENTIN SCH MG: 300 CAPSULE ORAL at 20:44

## 2017-09-06 NOTE — HOSPITALIST PROGRESS NOTE
Hospitalist Progress Note


Date of Service


Sep 6, 2017.





Subjective


Pt evaluation today including:  conversation w/ patient, conversation w/ family

, physical exam, chart review, lab review, review of studies, review of 

inpatient medication list


Patient seen and evaluated. No acute events overnight.


Continues to be at baseline mentation. Continues to have rib pain but is 

controlled with medications.


Awaiting authorization for hopefully [facility].





   Constitutional:  No fever, No chills


   Respiratory:  No cough, No shortness of breath


   Cardiovascular:  + chest pain (L chest pain - rib), No palpitations


   Abdomen:  No pain, No nausea, No vomiting, No diarrhea, No constipation


   Male :  No dysuria


   Heme:  No abnormal bleeding/bruising





Medications





Current Inpatient Medications








 Medications


  (Trade)  Dose


 Ordered  Sig/Kalpesh


 Route  Start Time


 Stop Time Status Last Admin


Dose Admin


 


 Acetaminophen


  (Tylenol Tab)  650 mg  Q4H  PRN


 PO  8/31/17 18:00


 9/30/17 17:59  9/4/17 17:43


650 MG


 


 Gabapentin


  (Neurontin Cap)  300 mg  QID


 PO  8/31/17 21:00


 9/30/17 20:59  9/6/17 12:29


300 MG


 


 Miconazole Nitrate


  (Desenex Powder)  1 appln  BID  PRN


 EXT  8/31/17 18:00


 9/30/17 17:59   


 


 


 Sertraline HCl


  (Zoloft Tab)  50 mg  DAILY


 PO  9/1/17 09:00


 10/1/17 08:59  9/6/17 07:45


50 MG


 


 Ondansetron HCl


  (Zofran Inj)  4 mg  Q6H  PRN


 IV  8/31/17 18:00


 9/30/17 17:59   


 


 


 Lidocaine


  (Lidoderm Patch


 5%)  1 patch  QAM


 TD  9/1/17 09:00


 10/1/17 08:59  9/6/17 07:44


1 PATCH


 


 Miscellaneous


  (Remove Lidoderm


 Patch)  1 ea  DAILY@21


 N/A  8/31/17 21:00


 9/30/17 20:59  9/5/17 20:24


1 EA


 


 Diclofenac Sodium


  (Voltaren 1% Top


 Gel)  1 appln  QID  PRN


 EXT  8/31/17 18:00


 9/30/17 17:59   


 


 


 Miscellaneous


  (Iv Fluids


 Completed)  1 ea  PRN  PRN


 N/A  8/31/17 21:15


 8/31/18 21:14   


 


 


 Polyethylene


  (Miralax Powder


 Packet)  17 gm  DAILY  PRN


 PO  9/1/17 12:30


 10/1/17 12:29   


 


 


 Carbidopa/Levodopa


  (Sinemet 25/


 100MG Tab)  1.5 tab  DAILY@0830,1230,1730


 PO  9/3/17 08:30


 10/3/17 08:29  9/6/17 11:41


1.5 TAB


 


 Allopurinol


  (Zyloprim Tab)  300 mg  DAILY


 PO  9/4/17 09:00


 10/4/17 08:59  9/6/17 07:45


300 MG


 


 Clonazepam


  (Klonopin Tab)  0.25 mg  HS  PRN


 PO  9/3/17 11:30


 10/3/17 11:29   


 


 


 Lisinopril


  (Zestril Tab)  2.5 mg  QAM


 PO  9/4/17 09:00


 10/4/17 08:59  9/6/17 07:45


2.5 MG


 


 Ranitidine HCl


  (zANTac TAB)  150 mg  BID


 PO  9/3/17 21:00


 10/3/17 20:59  9/6/17 07:45


150 MG


 


 Cholecalciferol


  (Vitamin D Tab)  2,000


 inter.unit  DAILY


 PO  9/4/17 09:00


 10/4/17 08:59  9/6/17 07:45


2,000 INTER.UNIT


 


 Cyanocobalamin


  (Vitamin B-12


 Tab)  1,000 mcg  DAILY


 PO  9/4/17 09:00


 10/4/17 08:59  9/6/17 07:45


1,000 MCG











Objective


Vital Signs











  Date Time  Temp Pulse Resp B/P (MAP) Pulse Ox O2 Delivery O2 Flow Rate FiO2


 


9/6/17 10:31 36.6 74 20  95 Room Air  


 


9/6/17 09:16      Room Air  


 


9/6/17 08:02 36.6 74 20 141/69 (93) 95 Room Air  


 


9/6/17 08:00     95 Room Air  


 


9/6/17 00:00      CPAP  


 


9/6/17 00:00 36.6 60 20 119/65 (83) 93 BiPAP  


 


9/5/17 15:56     100 Room Air  


 


9/5/17 15:54 36.1 61 18 147/74 (98) 100 Room Air  











Physical Exam


General Appearance:  WD/WN, no apparent distress, + pertinent finding (mask-

like face)


Eyes:  sclerae normal


ENT:  hearing grossly normal


Neck:  supple, no JVD, trachea midline


Respiratory/Chest:  lungs clear, normal breath sounds, no respiratory distress, 

no accessory muscle use


Cardiovascular:  regular rate, rhythm, no gallop, no murmur


Abdomen:  normal bowel sounds, non tender, soft


Extremities:  no pedal edema, no calf tenderness


Neurologic/Psychiatric:  alert, oriented x 3


Skin:  normal color, warm/dry





Laboratory Results





Last 24 Hours








Test


  9/5/17


16:30 9/5/17


19:47 9/6/17


07:34 9/6/17


11:24


 


Bedside Glucose 108 mg/dl  112 mg/dl  101 mg/dl  111 mg/dl 











Assessment and Plan


76 yo WM with  admitted on 8/31/2017 with Altered mental status/dehydration--

patient will be admitted to the medical surgical floor.


No signs of infection at this time.





Altered Mental Status: RESOLVED


- Dehydration vs Parkinsonism complication - currently at baseline mentation





L 5-7 Rib Fx with Possible Small Hemothorax:


- Lidoderm patch and Voltaren gel


- Incentive spirometry





S/P Sigmoidectomy for Sigmoid Volvulus: STABLE


- Outpatient evaluation by surgeon - progressing appropriately





Mesenteric Thrombus:


- Recommendations for anticoagulation x 6 months but due to fall risk - family 

decision for no further anticoagulation





HTN:


- Lisinopril 2.5 mg daily





Parkinsons:


- Sinemet 1.5 tab TID





GIULIA:


- May use own CPAP





Code Status: FULL RESUSCITATION





Disposition: 


- Await approval for [facility]. Medically optimal for D/C when arrangements made - 

medical status unchanged


Continued [facility] stay due to:  ambulation difficulties


Discharge planning:  rehab hospital

## 2017-09-06 NOTE — DISCHARGE INSTRUCTIONS
Discharge Instructions


Date of Service


Sep 6, 2017.





Admission


Reason for Admission:  Altered Mental Status, Dehydration





Discharge


Discharge Diagnosis / Problem:  Altered Mental Status - Dehydration





Discharge Goals


Goal(s):  Decrease discomfort, Improve function, Increase independence





Activity Recommendations


Activity Level:  Assistance Required


Therapies:  Physical Therapy, Occupational Therapy





.





Additional Information


Patient informed of condition:  Yes


Advance Directives:  Yes


DNR:  No


Level of Care:  Acute Rehab


Communicable Disease:  No


Prognosis:  Stable





Instructions / Follow-Up


Instructions / Follow-Up


76 yo WM with  admitted on 8/31/2017 with Altered mental status/dehydration - 

no signs of infection appreciated





Altered Mental Status: RESOLVED


- Dehydration vs Parkinsonism complication - currently at baseline mentation





L 5-7 Rib Fx with Possible Small Hemothorax:


- Lidoderm patch and Voltaren gel with PRN Tylenol


- Incentive spirometry





S/P Sigmoidectomy for Sigmoid Volvulus: STABLE


- Outpatient evaluation by surgeon - progressing appropriately





Mesenteric Thrombus:


- Recommendations for anticoagulation x 6 months but due to fall risk - family 

decision for no further anticoagulation and Coumadin has been D/C'd





HTN:


- Lisinopril 2.5 mg daily


- Will stop Triamterene/HCTZ at this time - does have intermittent low BPs


   -- Upon further evaluation this may need to be reinstituted





Parkinsons:


- Sinemet 1.5 tab - adamant about taking this medication at times instructed in 

medication reconciliation 





GIULIA:


- Utilizes CPAP at night





Code Status: FULL RESUSCITATION





Current Hospital Diet


Patient's current hospital diet: Diabetes Type 2 Diet





Discharge Diet


Recommended Diet:  Diabetes Type 2 Diet





Pending Studies


Studies pending at discharge:  no





Physician Orders On Transfer


POLST Discussion:  Not Applicable





Laboratory Results





Hemoglobin A1c








Test


  8/15/17


05:01 Range/Units


 


 


Estimated Average Glucose 111   mg/dl


 


Hemoglobin A1c 5.5  4.5-5.6  %








Lipid Panel








Test


  8/5/17


05:30 Range/Units


 


 


Triglycerides Level 74  0-150  mg/dl


 


Cholesterol Level 74  0-200  mg/dl


 


HDL Cholesterol 26   mg/dl


 


Cholesterol/HDL Ratio 2.8   


 


LDL Cholesterol, Calculated 33   mg/dl











Medical Emergencies








.


Who to Call and When:





Medical Emergencies:  If at any time you feel your situation is an emergency, 

please call 911 immediately.





.





Non-Emergent Contact


Non-Emergency issues call your:  Primary Care Provider


Call Non-Emergent contact if:  you have a fever, your pain is concerning you, 

you have any medication questions





.


.








"Provider Documentation" section prepared by Bertha Antonio.








.





Core Measure Problem


Core Measures:  None

## 2017-09-07 VITALS
TEMPERATURE: 98.06 F | HEART RATE: 59 BPM | SYSTOLIC BLOOD PRESSURE: 103 MMHG | OXYGEN SATURATION: 96 % | DIASTOLIC BLOOD PRESSURE: 62 MMHG

## 2017-09-07 VITALS
TEMPERATURE: 99.14 F | DIASTOLIC BLOOD PRESSURE: 67 MMHG | OXYGEN SATURATION: 93 % | SYSTOLIC BLOOD PRESSURE: 129 MMHG | HEART RATE: 63 BPM

## 2017-09-07 VITALS — OXYGEN SATURATION: 93 %

## 2017-09-07 VITALS — OXYGEN SATURATION: 96 %

## 2017-09-07 RX ADMIN — LIDOCAINE SCH PATCH: 50 PATCH CUTANEOUS at 08:02

## 2017-09-07 RX ADMIN — ALLOPURINOL SCH MG: 300 TABLET ORAL at 08:02

## 2017-09-07 RX ADMIN — GABAPENTIN SCH MG: 300 CAPSULE ORAL at 13:12

## 2017-09-07 RX ADMIN — GABAPENTIN SCH MG: 300 CAPSULE ORAL at 16:48

## 2017-09-07 RX ADMIN — CARBIDOPA AND LEVODOPA SCH TAB: 25; 100 TABLET ORAL at 11:51

## 2017-09-07 RX ADMIN — CARBIDOPA AND LEVODOPA SCH TAB: 25; 100 TABLET ORAL at 16:48

## 2017-09-07 RX ADMIN — CARBIDOPA AND LEVODOPA SCH TAB: 25; 100 TABLET ORAL at 08:01

## 2017-09-07 RX ADMIN — Medication SCH MCG: at 08:02

## 2017-09-07 RX ADMIN — RANITIDINE SCH MG: 150 TABLET ORAL at 08:02

## 2017-09-07 RX ADMIN — LISINOPRIL SCH MG: 2.5 TABLET ORAL at 08:02

## 2017-09-07 RX ADMIN — GABAPENTIN SCH MG: 300 CAPSULE ORAL at 08:02

## 2017-09-07 RX ADMIN — Medication SCH INTER.UNIT: at 08:02

## 2017-09-07 RX ADMIN — SERTRALINE HYDROCHLORIDE SCH MG: 50 TABLET, FILM COATED ORAL at 08:02

## 2017-09-07 NOTE — DISCHARGE SUMMARY
Discharge Summary


Date of Service


Sep 7, 2017.





Discharge Summary


Admission Date:


Aug 31, 2017 at 18:06


Discharge Date:  Sep 7, 2017


Discharge Disposition:  Rehab


Principal Diagnosis:  Encephalopathy from Dehydration vs Pain vs Parkinsonism 

and L Rib Fx


Problems/Secondary Diagnoses:


1. Multiple Falls


2. L 5-7 Rib Fx with Possible Small Hemothorax


3. Sigmoid Volvulus S/P Sigmoidectomy


4. Mesenteric Thrombus


5. HTN


6. Parkinsonism


7. GIULIA on night CPAP


Immunizations:  


   Have You Had Influenza Vaccine:  Unknown


   History of Tetanus Vaccine?:  Unknown


   History of Pneumococcal:  Unknown


   History of Hepatitis B Vaccine:  Unknown


Procedures:


HEAD CT NONCONTRAST





Findings: Suboptimal evaluation the brain due to the motion artifact. The


calvarium and skull base are intact. There is no mass, hematoma, midline shift,


acute infarct. White matter hypodensity is nonspecific but suggestive of


microvascular ischemic change. The ventricles and sulci demonstrate mild


age-related involutional changes.





Impression:


Motion artifact. No definite acute intracranial abnormality. Consider repeat


study if the patient symptoms continue to progress. 





CT OF THE CHEST WITH IV CONTRAST





FINDINGS:  There is no evidence of traumatic injury to the thoracic aorta. The


heart is moderately enlarged. No enlarged thoracic lymph nodes are identified.


There is no pneumothorax. A small left pleural effusion is present. Linear and


groundglass opacities within lungs favor atelectasis. No acute thoracic spine


fracture is present. There are acute minimally displaced fractures of the left


fifth and seventh ribs as well as a nondisplaced fracture of the lateral left


sixth rib. These are acute. There are additional old left-sided rib fractures.


Calcified right lower lobe granuloma is present. Lungs are suboptimally assessed


due to respiratory motion. 





IMPRESSION:  


1. Acute mildly displaced fractures of the lateral left fifth and seventh ribs


and acute nondisplaced fracture of the lateral left sixth rib. No pneumothorax.


Small left pleural effusion may reflect a small hemothorax given the rib


fractures.


2. Subpleural and ground glass opacities which favor atelectasis. An infectious


process is considered less likely.


3. No additional acute traumatic findings within the chest.





ABD/PELVIS IV CONTRAST ONLY





FINDINGS:





The examination is slightly degraded by motion artifact, which decreases image


quality and diagnostic sensitivity.





 topogram: Gaseous distention of bowel.





Lung bases: Extensive bibasilar consolidation increased from prior. Small left


pleural effusion. Multichamber enlargement of the heart. Aortic valve


atherosclerosis. No pericardial effusion.





Liver: Normal morphology. No liver lesion. Patent hepatic vasculature.





Biliary: No intrahepatic or extrahepatic biliary ductal dilatation. Normal


gallbladder.





Pancreas: Mild parenchymal atrophy. Duodenal diverticulum noted in the region of


the pancreatic head.





Spleen: Parenchymal calcification, possibly suggesting old granulomatous


disease.





Adrenal glands: Nonspecific nodular thickening of the adrenal glands.





Kidneys and ureters: Exophytic 8 cm simple appearing left renal cyst with


lobular extension into the renal sinus. No hydronephrosis. Ureters normal.





Bladder: Mild circumferential bladder wall thickening.





Pelvic organs: Prostate enlargement likely secondary to benign prostatic


hyperplasia.





Bowel: Anastomosis noted in the region of the upper rectum likely indicating


prior sigmoidectomy. Moderate stool burden throughout the colon, which is also


mildly distended with gas. Ileocolic anastomosis at the level of the hepatic


flexure, where there is associated minimal surrounding stranding, possibly


indicating chronic scarring/adhesions. No bowel obstruction. Small bowel is not


significantly distended. Duodenal diverticulum at the level of pancreatic head


suggested. No pneumatosis.





Peritoneal cavity: The previously noted collection immediately superior to the


sigmoid anastomosis has slightly decreased in size now measuring 4.1 x 1.4 cm,


previously 4.4 x 2.4 cm. A small focus of gas is noted within this collection.


Gas within the collection appears new from prior.





Vasculature: Interval opacification of the jejunal vein draining into the


superior mesenteric vein without evidence of thrombus on the current exam.


Atherosclerosis of the normal caliber abdominal aorta.





Lymph nodes: No enlarged lymph nodes in the abdomen or pelvis.





Abdominal wall: Postsurgical changes along the midline ventral abdominal wall.





Musculoskeletal: Degenerative changes of the spine.





IMPRESSION:


1.  No evidence of acute intra-abdominal injury.


2.  Postsurgical changes of sigmoidectomy with interval decrease in size of the


gas and fluid containing collection immediately superior to the anastomosis.


This fluid collection remains concerning for an abscess secondary to anastomotic


leak, especially given the presence of gas, which is new from prior.


3.  Interval resolution of previously demonstrated filling defect within a


branch of the superior mesenteric vein.


4.  Persistent colonic distention with stool and gas, suggesting colonic ileus.


Consultations:


1. General Surgery - Dr. Neal


2. Wound Care


3. PT/OT





Medication Reconciliation


New Medications:  


Diclofenac Sod (Voltaren) 100 Appln/100 Gm Gel


1 APPLN EXT QID PRN for WHEN LIDODERM PATCH NOT ON for 14 Days





Levodopa/Carbidopa (Sinemet 25MG/100MG) 1 Ea Tab


1.5 TAB PO DAILY@0830,1230,1730 for 30 Days, TAB





Lidocaine (Lidocaine) 1 Patch Tdsy


1 PATCH TD QAM for 14 Days





 


Continued Medications:  


Acetaminophen (Tylenol) 325 Mg Tab


650 MG PO, TAB





Allopurinol (Zyloprim) 300 Mg Tab


300 MG PO DAILY, TAB





Cholecalciferol (Vitamin D3) 2,000 Unit Cap


2000 UNITS PO DAILY for 90 Days, CAP 3 Refills





Clonazepam (Klonopin) 0.5 Mg Tab


0.25 MG PO HS PRN for Sleep for 3 Days, #3 TAB (This prescription has been 

renewed)





Cyanocobalamin (Vitamin B12) 1,000 Mcg Tab


1000 MCG PO DAILY for 30 Days





Gabapentin (Neurontin) 300 Mg Cap


300 MG PO QID, CAP





Lisinopril (Lisinopril) 2.5 Mg Tab


2.5 MG PO QAM for 30 Days, #30 TAB





Miconazole Nitrate (Desenex Shake Powder) 43 Appln/43 Gm Powd


1 APPLN EXT BID PRN for Affected Skin Folds for 30 Days





Ondansetron Hcl (Zofran) 4 Mg Tab


4 MG PO q6hrs PRN for Nausea, TAB





Polyethylene Glycol 3350 (Bulk (Polyethylene Glycol 3350) 1 Pow Pow


17 GM PO DAILY PRN for Constipation, #255 GM





Ranitidine (Zantac) 150 Mg Tab


150 MG PO BID, TAB





Sertraline (Zoloft) 25 Mg Tab


50 MG PO DAILY for 30 Days, #60 TAB 2 Refills





 


Discontinued Medications:  


Carbidopa/Levodopa (Sinemet 25MG/100MG)  Tab


1.5 TAB PO TID for 30 Days, TAB





Triamterene/Hctz (Triamterene/Hctz 37.5-25MG) 1 Tab Tab


0.5 TAB PO DAILY for 30 Days, #15 TAB 0 Refills





Warfarin Sod (Coumadin) 5 Mg Tab


5 MG PO DAILY@1600 for 30 Days, TAB











Discharge Exam


Review of Systems:  


   Constitutional:  No fever, No chills


   Respiratory:  No cough, No dyspnea on exertion, No dyspnea at rest


   Cardiovascular:  No chest pain, No palpitations


   Abdomen:  No pain, No nausea, No vomiting, No diarrhea, No constipation


   Musculoskeletal:  No swelling, No calf pain


   Genitourinary - Male:  No dysuria


   Hematologic / Lymphatic:  No abnormal bleeding/bruising


Physical Exam:  


   General Appearance:  WD/WN, no apparent distress, + pertinent finding (flat 

mask-like face)


   Eyes:  sclerae normal


   ENT:  hearing grossly normal


   Neck:  supple, no JVD, trachea midline


   Respiratory/Chest:  lungs clear, normal breath sounds, no respiratory 

distress, no accessory muscle use


   Cardiovascular:  regular rate, rhythm, no gallop, no murmur


   Abdomen / GI:  normal bowel sounds, non tender, soft, + pertinent finding (

well-approximated/healed mid-line surgical incision)


   Extremities:  no pedal edema


   Neurologic/Psychiatric:  alert, oriented x 3


   Skin:  normal color, warm/dry





Hospital Course


ADMISSION: The patient is a 77-year-old male with most recent hospitalizations 

from July 27 to August 10 no from August 16 to August 17, during which time he 

underwent surgery for sigmoid volvulus and had issues with postoperative ileus 

and question of mesenteric thrombus.  He had been discharged from [facility] 6 days ago, and family reports he had been gradually becoming more 

confused and less interactive, and then worsened yesterday, when he had his 

first fall, and then had a second fall earlier in the day prior to arrival.  

His sister reports that he has been leaning toward the right ever since that 

injury to his left side.  She reports that he did not hit his head during this 

time.  His sister does report that since he is been in the emergency department 

and had gotten IV fluids, that he seems to become more responsive and 

interactive with her.





HOSPITAL COURSE: Mr. Chaudhari was admitted for AMS from dehydration vs 

Parkinsonism and did appropriately respond to IVF and quickly went to baseline 

mentation. He has been experiencing multiple falls and did sustain L 5-7 Rib Fx 

with a possible small hemothorax visible on imaging. Due to fall risk, family 

decided against further anticoagulation for mesenteric thrombus which was 

recently discovered after sigmoidectomy for a sigmoid volvulus. Patient follows 

as outpatient for his sigmoidectomy and is progressing as expected. PT/OT 

evaluations were obtained that recommended inpatient rehab. Given intermittent 

low pressures and falls his triameterene/HCTZ was D/Cd but may need resumed 

pending further evaluation. Patient is at baseline mentation and is optimal for 

D/C to [facility].


Total Time Spent:  Greater than 30 minutes


This includes examination of the patient, discharge planning, medication 

reconciliation, and communication with other providers.





Discharge Instructions


Please refer to the electronic Patient Visit Report (Discharge Instructions) 

for additional information.





Additional Copies To


[facility]; Johnnie Reis M.D.

## 2017-12-03 ENCOUNTER — HOSPITAL ENCOUNTER (EMERGENCY)
Dept: HOSPITAL 45 - C.EDB | Age: 77
Discharge: HOME | End: 2017-12-03
Payer: COMMERCIAL

## 2017-12-03 VITALS
WEIGHT: 209.44 LBS | BODY MASS INDEX: 28.37 KG/M2 | HEIGHT: 72.01 IN | HEIGHT: 72.01 IN | WEIGHT: 209.44 LBS | BODY MASS INDEX: 28.37 KG/M2

## 2017-12-03 VITALS — TEMPERATURE: 98.06 F

## 2017-12-03 VITALS — OXYGEN SATURATION: 91 % | HEART RATE: 90 BPM | SYSTOLIC BLOOD PRESSURE: 120 MMHG | DIASTOLIC BLOOD PRESSURE: 73 MMHG

## 2017-12-03 DIAGNOSIS — E78.5: ICD-10-CM

## 2017-12-03 DIAGNOSIS — S80.811A: ICD-10-CM

## 2017-12-03 DIAGNOSIS — S60.811A: Primary | ICD-10-CM
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
## 2017-12-03 DIAGNOSIS — I10: ICD-10-CM

## 2017-12-03 DIAGNOSIS — G20: ICD-10-CM

## 2017-12-03 DIAGNOSIS — S80.812A: ICD-10-CM

## 2017-12-03 DIAGNOSIS — E11.42: ICD-10-CM

## 2017-12-03 NOTE — DIAGNOSTIC IMAGING REPORT
RIGHT WRIST 4 VIEWS



CLINICAL HISTORY: Fall with right wrist pain.



FINDINGS: 4 views of the right wrist are obtained. No prior studies are

available for comparison at the time of dictation. The skeletal structures are

osteopenic. No fracture is seen. Mild arthritic change is present at the first

carpometacarpal joint. The joint spaces are otherwise preserved. Soft tissue

edema is present around the wrist and the distal forearm.



IMPRESSION: Soft tissue swelling with no radiographic evidence of right wrist

fracture. Consider short-term radiographic follow-up if there is clinical

concern for occult fracture.







Electronically signed by:  Arcenio Doshi M.D.

12/3/2017 2:03 PM



Dictated Date/Time:  12/3/2017 2:02 PM

## 2017-12-03 NOTE — EMERGENCY ROOM VISIT NOTE
ED Visit Note


First contact with patient:  13:37


77 year old male with an injury to his wrist was fully evaluated by Sridhar Stover PA-C.  Please his note.  I also independently evaluated the patient.

## 2017-12-04 NOTE — EMERGENCY ROOM VISIT NOTE
ED Visit Note


First contact with patient:  13:37


Chief Complaint: I fell and hurt my right wrist.





History of Present Illness: Mr. Chaudhari is a 77-year-old white male who ambulates 

in the ED using a walker accompanied by his daughter complaining of left wrist 

pain.


Historically patient reports she has a history of Parkinson's and is unstable 

on his feet some time.


Patient reports approximately one hour ago he he was walking into the kitchen 

with his walker and felt like he was going to fall.  He attempted to lean on 

the refrigerator but did not realize it was slightly further way than he 

thought.  He then slid down the door of the refrigerator and onto the floor.  

As he slid on the refrigerator he had his hand trapped between himself and the 

refrigerator with a watch on and injured his right wrist.


He reports there was no lightheaded or his anus before the fall.  At the time 

of the fall he did not strike his head or have a loss of consciousness.  After 

the fall he has had no signs of head injury.


Because of his Parkinson's he could not get himself off the floor and had to 

call family members for assistance.  He reports his daughter arrived and 

noticed that he was bleeding and was complaining of left wrist pain.  She 

assisted him to the standing position and brought him into the emergency 

department for further evaluation and care.


Currently the patient's only complaint is left wrist pain.  He describes this 

as a throbbing and burning sensation sensation.  He rates his discomfort 3.5/

10.  His pain is nonradiating.  His pain worsens with flexion and extension of 

the wrist as well as palpation.  He has not identified any alleviating factors 

related to the pain.  His daughter reports she has not had a medication for 

pain prior to arrival at the hospital.


He denies any headaches, dizziness, lightheadedness, visual changes, hearing 

changes, difficulty speaking, difficulty swallowing, chest pain, shortness of 

breath, abdominal pain, nausea, vomiting, right upper extremity weakness/

numbness/tingling.





Review of Systems: As noted above in history of present illness.  8 body 

systems were reviewed and found to be negative as noted above.





Past Medical History: As previously noted and hypertension, diabetes, 

diverticulitis, gout, peripheral neuropathy and mixed hyperlipidemia.


Current Medications:








 Medications  Dose


 Route/Sig


 Max Daily Dose Days Date Category


 


 Zantac


  (Ranitidine HCl)


 300 Mg Tab  300 Mg


 PO BID


    12/3/17 Reported


 


 Sinemet


 25MG/100MG


  (Carbidopa/Levodopa)


 1 Ea Tab  1.5 Tab


 PO DAILY@0830,1230,1730


   30 9/6/17 Rx


 


 Klonopin


  (Clonazepam) 0.5


 Mg Tab  0.25 Mg


 PO HS PRN


   3 9/6/17 Rx


 


 Tylenol


  (Acetaminophen)


 325 Mg Tab  650 Mg


 PO Q6 PRN


    8/31/17 Reported


 


 Polyethylene


 Glycol 3350


  (Polyethylene


 Glycol 3350


  (Bulk) 1 Pow Pow  17 Gm


 PO DAILY PRN


    8/10/17 Rx


 


 Zofran


  (Ondansetron HCl)


 4 Mg Tab  4 Mg


 PO Q6HRS PRN


    4/18/17 Reported


 


 Vitamin B12


  (Cyanocobalamin)


 1,000 Mcg Tab  1,000 Mcg


 PO DAILY


   30 2/27/17 Rx


 


 Vitamin D3


  (Cholecalciferol)


 2,000 Unit Cap  2,000 Units


 PO DAILY


   90 2/21/17 Reported


 


 Neurontin


  (Gabapentin) 300


 Mg Cap  300 Mg


 PO QID


    7/25/14 Reported


 


 Zyloprim


  (Allopurinol) 300


 Mg Tab  300 Mg


 PO DAILY


    7/25/14 Reported





Allergies to Medications: Patient denies.


Social History: Patient is not employed; he feels safe in his home environment; 

he denies tobacco use.





Physical Examination:


Vital Signs: 








  Date Time  Temp Pulse Resp B/P (MAP) Pulse Ox O2 Delivery O2 Flow Rate FiO2


 


12/3/17 15:02  90 18 120/73 91   


 


12/3/17 13:34 36.7 98 17 113/56 93 Room Air  





GENERAL: 77-year-old male in mild distress due to pain, nontoxic-appearing, 

afebrile and hemodynamically stable.


NEUROLOGICAL: Awake, alert and oriented to person, place and time.  No focal 

motor sensory deficits.  Cranial nerves II through XII grossly intact.  Good 

short-term and long-term recall.


SKIN: Warm, dry and pink.  Right Forearm: 5 x 5 cm deep abrasion on the 

posterior aspect of the distal forearm extending into the wrist.  Minimal 

bleeding.  Additionally noted that the patient has a history of dry skin and 

there are some superficial abrasions on his legs that has only abraded the 

superficial skin.


HEENT:  Atraumatic and normocephalic.  No facial trauma noted.  No 

malocclusion.  No intraoral trauma.  Airway patent.  Speech is normal and 

clear.  Trachea midline.  No jugular venous distention.


BACK:  No tenderness over the bony cervical and thoracic spine.  Full range of 

motion of the cervical spine.


THORAX:  Lungs sounds are clear to auscultation and equal bilaterally with 

symmetrical chest wall.  No crepitus, tenderness, subcutaneous air or 

deformities noted.


HEART:  Regular rate and rhythm.  No gallops, rubs or murmurs are appreciated.


ABDOMEN: Flat, soft and nontender.  Positive bowel sounds in all quadrants.  No 

guarding, rigidity or organomegaly.


UPPER EXTREMITIES:  No gross bony deformities.  No tenderness in the shoulders, 

upper arms, elbows or hands.  On the right forearm patient has a soft tissue 

injury as noted above.  There is mild tenderness and swelling in this area 

predominantly over the distal ulna.  I do not appreciate any bony deformity or 

crepitus.  Throughout the hand the skin was warm and pink and capillary refill 

is brisk.


LOWER EXTREMITIES: No gross bony deformity.  No shortening or malrotation.  No 

tenderness in the hips, knees, lower legs, ankles.  All distal neurovascular 

statuses are intact and equal bilaterally.  





ED Course:


Patient is assessed as noted above.


Patient's medication list was reviewed.


Patient was offered pain medication and refused.


Right Wrist X-Rays: Were read by myself and the radiologist showing no acute 

fractures or dislocations.  Radiologist does note the skeletal structures are 

osteopenic and there are mild arthritic changes and soft tissue edema.


Patient's wrist abrasion was cleansed and deep breathe did removing the 

superficial abraded tissues.  The wound was then covered with a sterile 

bacitracin dressing and patient's wrist was placed in a lacer splint; patient 

was tried with his walker and splint and was able to ambulate well.


Patient's case was reviewed with Dr. Nelson; we agreed on diagnostic approach, 

treatment, disposition and plan.


Patient and daughter were educated about today's findings and instructed on his 

treatment plan; they verbalized understanding and agreement with this plan.





Clinical Impression: Right wrist abrasion.  Status post fall.





Patient's blood pressure:  Normal


Blood pressure disposition:  None required.





Disposition: Patient discharged home in stable condition accompanied by his 

daughter; prior to departure he was reassessed and subjectively reported he was 

pain-free.





Plan:


Patient was encouraged to continue his current medications as prescribed.


Patient was encouraged to alternate ibuprofen and acetaminophen as needed for 

pain.


Patient was encouraged use ice over areas of pain and swelling and to use his 

splint.


Patient was educated on wound care and signs of infection.


Patient was encouraged to follow-up with family physician for recheck or any 

signs of infection.


Patient was encouraged return the ED for worsening/uncontrolled pain, 

uncontrolled swelling, signs of infection or any new/concerning symptoms.

## 2018-02-15 ENCOUNTER — HOSPITAL ENCOUNTER (OUTPATIENT)
Dept: HOSPITAL 45 - C.LABPVFM | Age: 78
Discharge: HOME | End: 2018-02-15
Attending: FAMILY MEDICINE
Payer: COMMERCIAL

## 2018-02-15 DIAGNOSIS — E11.9: Primary | ICD-10-CM

## 2018-02-15 DIAGNOSIS — G20: ICD-10-CM

## 2018-02-15 LAB
ALBUMIN SERPL-MCNC: 4 GM/DL (ref 3.4–5)
ALP SERPL-CCNC: 107 U/L (ref 45–117)
ALT SERPL-CCNC: 11 U/L (ref 12–78)
AST SERPL-CCNC: 14 U/L (ref 15–37)
BUN SERPL-MCNC: 22 MG/DL (ref 7–18)
CALCIUM SERPL-MCNC: 9.1 MG/DL (ref 8.5–10.1)
CO2 SERPL-SCNC: 28 MMOL/L (ref 21–32)
CREAT SERPL-MCNC: 1.3 MG/DL (ref 0.6–1.4)
EOSINOPHIL NFR BLD AUTO: 234 K/UL (ref 130–400)
GLUCOSE SERPL-MCNC: 115 MG/DL (ref 70–99)
HCT VFR BLD CALC: 44.2 % (ref 42–52)
HGB BLD-MCNC: 14.1 G/DL (ref 14–18)
MCH RBC QN AUTO: 29.1 PG (ref 25–34)
MCHC RBC AUTO-ENTMCNC: 31.9 G/DL (ref 32–36)
MCV RBC AUTO: 91.1 FL (ref 80–100)
PMV BLD AUTO: 11.1 FL (ref 7.4–10.4)
POTASSIUM SERPL-SCNC: 4.2 MMOL/L (ref 3.5–5.1)
PROT SERPL-MCNC: 8.3 GM/DL (ref 6.4–8.2)
RED CELL DISTRIBUTION WIDTH CV: 16.6 % (ref 11.5–14.5)
RED CELL DISTRIBUTION WIDTH SD: 54.7 FL (ref 36.4–46.3)
SODIUM SERPL-SCNC: 135 MMOL/L (ref 136–145)
WBC # BLD AUTO: 7.4 K/UL (ref 4.8–10.8)

## 2018-02-16 LAB — HBA1C MFR BLD: 5.9 % (ref 4.5–5.6)
